# Patient Record
Sex: MALE | Race: WHITE | Employment: OTHER | URBAN - METROPOLITAN AREA
[De-identification: names, ages, dates, MRNs, and addresses within clinical notes are randomized per-mention and may not be internally consistent; named-entity substitution may affect disease eponyms.]

---

## 2017-01-25 ENCOUNTER — OFFICE VISIT (OUTPATIENT)
Dept: ORTHOPEDIC SURGERY | Age: 67
End: 2017-01-25

## 2017-01-25 VITALS
BODY MASS INDEX: 31.7 KG/M2 | HEIGHT: 69 IN | SYSTOLIC BLOOD PRESSURE: 98 MMHG | HEART RATE: 63 BPM | RESPIRATION RATE: 16 BRPM | DIASTOLIC BLOOD PRESSURE: 55 MMHG | WEIGHT: 214 LBS

## 2017-01-25 DIAGNOSIS — M65.312 TRIGGER FINGER OF LEFT THUMB: ICD-10-CM

## 2017-01-25 PROCEDURE — 4004F PT TOBACCO SCREEN RCVD TLK: CPT | Performed by: ORTHOPAEDIC SURGERY

## 2017-01-25 PROCEDURE — 20550 NJX 1 TENDON SHEATH/LIGAMENT: CPT | Performed by: ORTHOPAEDIC SURGERY

## 2017-01-25 PROCEDURE — 99203 OFFICE O/P NEW LOW 30 MIN: CPT | Performed by: ORTHOPAEDIC SURGERY

## 2017-01-25 PROCEDURE — G8484 FLU IMMUNIZE NO ADMIN: HCPCS | Performed by: ORTHOPAEDIC SURGERY

## 2017-01-25 PROCEDURE — G8598 ASA/ANTIPLAT THER USED: HCPCS | Performed by: ORTHOPAEDIC SURGERY

## 2017-01-25 PROCEDURE — 3017F COLORECTAL CA SCREEN DOC REV: CPT | Performed by: ORTHOPAEDIC SURGERY

## 2017-01-25 PROCEDURE — G8427 DOCREV CUR MEDS BY ELIG CLIN: HCPCS | Performed by: ORTHOPAEDIC SURGERY

## 2017-01-25 PROCEDURE — G8419 CALC BMI OUT NRM PARAM NOF/U: HCPCS | Performed by: ORTHOPAEDIC SURGERY

## 2017-01-25 PROCEDURE — 4040F PNEUMOC VAC/ADMIN/RCVD: CPT | Performed by: ORTHOPAEDIC SURGERY

## 2017-04-18 ENCOUNTER — OFFICE VISIT (OUTPATIENT)
Dept: SLEEP MEDICINE | Age: 67
End: 2017-04-18

## 2017-04-18 VITALS
OXYGEN SATURATION: 96 % | HEIGHT: 69 IN | BODY MASS INDEX: 32.29 KG/M2 | SYSTOLIC BLOOD PRESSURE: 130 MMHG | WEIGHT: 218 LBS | HEART RATE: 71 BPM | DIASTOLIC BLOOD PRESSURE: 70 MMHG

## 2017-04-18 DIAGNOSIS — I25.10 CORONARY ARTERY DISEASE INVOLVING NATIVE CORONARY ARTERY OF NATIVE HEART WITHOUT ANGINA PECTORIS: Chronic | ICD-10-CM

## 2017-04-18 DIAGNOSIS — E11.9 CONTROLLED TYPE 2 DIABETES MELLITUS WITHOUT COMPLICATION, UNSPECIFIED LONG TERM INSULIN USE STATUS: Chronic | ICD-10-CM

## 2017-04-18 DIAGNOSIS — I10 HYPERTENSION, ESSENTIAL: Chronic | ICD-10-CM

## 2017-04-18 DIAGNOSIS — G47.33 OBSTRUCTIVE SLEEP APNEA (ADULT) (PEDIATRIC): Primary | Chronic | ICD-10-CM

## 2017-04-18 PROCEDURE — 99214 OFFICE O/P EST MOD 30 MIN: CPT | Performed by: INTERNAL MEDICINE

## 2017-04-18 PROCEDURE — 3017F COLORECTAL CA SCREEN DOC REV: CPT | Performed by: INTERNAL MEDICINE

## 2017-04-18 PROCEDURE — G8427 DOCREV CUR MEDS BY ELIG CLIN: HCPCS | Performed by: INTERNAL MEDICINE

## 2017-04-18 PROCEDURE — 1123F ACP DISCUSS/DSCN MKR DOCD: CPT | Performed by: INTERNAL MEDICINE

## 2017-04-18 PROCEDURE — 4004F PT TOBACCO SCREEN RCVD TLK: CPT | Performed by: INTERNAL MEDICINE

## 2017-04-18 PROCEDURE — 3046F HEMOGLOBIN A1C LEVEL >9.0%: CPT | Performed by: INTERNAL MEDICINE

## 2017-04-18 PROCEDURE — G8598 ASA/ANTIPLAT THER USED: HCPCS | Performed by: INTERNAL MEDICINE

## 2017-04-18 PROCEDURE — 4040F PNEUMOC VAC/ADMIN/RCVD: CPT | Performed by: INTERNAL MEDICINE

## 2017-04-18 PROCEDURE — G8419 CALC BMI OUT NRM PARAM NOF/U: HCPCS | Performed by: INTERNAL MEDICINE

## 2017-04-18 ASSESSMENT — ENCOUNTER SYMPTOMS
PHOTOPHOBIA: 0
SHORTNESS OF BREATH: 0
EYE PAIN: 0
SINUS PRESSURE: 0
STRIDOR: 0
CHEST TIGHTNESS: 0

## 2017-04-18 ASSESSMENT — SLEEP AND FATIGUE QUESTIONNAIRES
HOW LIKELY ARE YOU TO NOD OFF OR FALL ASLEEP WHILE WATCHING TV: 0
ESS TOTAL SCORE: 3
HOW LIKELY ARE YOU TO NOD OFF OR FALL ASLEEP IN A CAR, WHILE STOPPED FOR A FEW MINUTES IN TRAFFIC: 0
HOW LIKELY ARE YOU TO NOD OFF OR FALL ASLEEP WHILE SITTING AND READING: 1
HOW LIKELY ARE YOU TO NOD OFF OR FALL ASLEEP WHEN YOU ARE A PASSENGER IN A CAR FOR AN HOUR WITHOUT A BREAK: 0
HOW LIKELY ARE YOU TO NOD OFF OR FALL ASLEEP WHILE SITTING AND TALKING TO SOMEONE: 0
HOW LIKELY ARE YOU TO NOD OFF OR FALL ASLEEP WHILE SITTING QUIETLY AFTER LUNCH WITHOUT ALCOHOL: 0
HOW LIKELY ARE YOU TO NOD OFF OR FALL ASLEEP WHILE LYING DOWN TO REST IN THE AFTERNOON WHEN CIRCUMSTANCES PERMIT: 2
HOW LIKELY ARE YOU TO NOD OFF OR FALL ASLEEP WHILE SITTING INACTIVE IN A PUBLIC PLACE: 0

## 2017-05-26 PROBLEM — R20.0 NUMBNESS OF ARM: Status: ACTIVE | Noted: 2017-05-26

## 2017-06-02 ENCOUNTER — NURSE ONLY (OUTPATIENT)
Dept: CARDIOLOGY CLINIC | Age: 67
End: 2017-06-02

## 2017-06-02 ENCOUNTER — HOSPITAL ENCOUNTER (OUTPATIENT)
Dept: NON INVASIVE DIAGNOSTICS | Age: 67
Discharge: HOME | End: 2017-06-02
Attending: INTERNAL MEDICINE | Admitting: INTERNAL MEDICINE

## 2017-06-02 ENCOUNTER — OFFICE VISIT (OUTPATIENT)
Dept: CARDIOLOGY CLINIC | Age: 67
End: 2017-06-02

## 2017-06-02 VITALS
DIASTOLIC BLOOD PRESSURE: 74 MMHG | WEIGHT: 217 LBS | HEART RATE: 56 BPM | HEIGHT: 69 IN | BODY MASS INDEX: 32.14 KG/M2 | SYSTOLIC BLOOD PRESSURE: 144 MMHG | OXYGEN SATURATION: 99 %

## 2017-06-02 DIAGNOSIS — G45.1 CAROTID ARTERY SYNDROME HEMISPHERIC: ICD-10-CM

## 2017-06-02 DIAGNOSIS — Z95.5 STATUS POST INSERTION OF DRUG-ELUTING STENT INTO RIGHT CORONARY ARTERY FOR CORONARY ARTERY DISEASE: Chronic | ICD-10-CM

## 2017-06-02 DIAGNOSIS — G45.9 TRANSIENT CEREBRAL ISCHEMIA, UNSPECIFIED TYPE: ICD-10-CM

## 2017-06-02 DIAGNOSIS — I49.9 IRREGULAR HEART BEATS: ICD-10-CM

## 2017-06-02 DIAGNOSIS — I49.8 OTHER CARDIAC ARRHYTHMIA: Primary | ICD-10-CM

## 2017-06-02 DIAGNOSIS — I49.8 OTHER SPECIFIED CARDIAC ARRHYTHMIAS: Primary | ICD-10-CM

## 2017-06-02 DIAGNOSIS — I25.10 CORONARY ARTERY DISEASE INVOLVING NATIVE HEART WITHOUT ANGINA PECTORIS, UNSPECIFIED VESSEL OR LESION TYPE: Chronic | ICD-10-CM

## 2017-06-02 DIAGNOSIS — Z95.5 STATUS POST INSERTION OF DRUG-ELUTING STENT INTO LEFT ANTERIOR DESCENDING (LAD) ARTERY: ICD-10-CM

## 2017-06-02 DIAGNOSIS — R00.2 HEART PALPITATIONS: ICD-10-CM

## 2017-06-02 DIAGNOSIS — E78.2 MIXED HYPERLIPIDEMIA: Chronic | ICD-10-CM

## 2017-06-02 DIAGNOSIS — G45.1 TIA INVOLVING RIGHT INTERNAL CAROTID ARTERY: ICD-10-CM

## 2017-06-02 DIAGNOSIS — I10 ESSENTIAL HYPERTENSION: Chronic | ICD-10-CM

## 2017-06-02 LAB
LV EF: 60 %
LVEF MODALITY: NORMAL

## 2017-06-02 PROCEDURE — G8417 CALC BMI ABV UP PARAM F/U: HCPCS | Performed by: INTERNAL MEDICINE

## 2017-06-02 PROCEDURE — G8598 ASA/ANTIPLAT THER USED: HCPCS | Performed by: INTERNAL MEDICINE

## 2017-06-02 PROCEDURE — 99214 OFFICE O/P EST MOD 30 MIN: CPT | Performed by: INTERNAL MEDICINE

## 2017-06-02 PROCEDURE — 1036F TOBACCO NON-USER: CPT | Performed by: INTERNAL MEDICINE

## 2017-06-02 PROCEDURE — G8427 DOCREV CUR MEDS BY ELIG CLIN: HCPCS | Performed by: INTERNAL MEDICINE

## 2017-06-02 PROCEDURE — 1111F DSCHRG MED/CURRENT MED MERGE: CPT | Performed by: INTERNAL MEDICINE

## 2017-06-02 PROCEDURE — 3017F COLORECTAL CA SCREEN DOC REV: CPT | Performed by: INTERNAL MEDICINE

## 2017-06-02 PROCEDURE — 4040F PNEUMOC VAC/ADMIN/RCVD: CPT | Performed by: INTERNAL MEDICINE

## 2017-06-02 PROCEDURE — 1123F ACP DISCUSS/DSCN MKR DOCD: CPT | Performed by: INTERNAL MEDICINE

## 2017-06-02 RX ORDER — LISINOPRIL 20 MG/1
TABLET ORAL
Qty: 90 TABLET | Refills: 3 | Status: SHIPPED | OUTPATIENT
Start: 2017-06-02

## 2017-06-02 RX ORDER — ADHESIVE BANDAGE 3/4"
1 BANDAGE TOPICAL DAILY
Qty: 1 EACH | Refills: 0 | Status: SHIPPED | OUTPATIENT
Start: 2017-06-02

## 2017-06-09 ENCOUNTER — TELEPHONE (OUTPATIENT)
Dept: NEUROLOGY | Age: 67
End: 2017-06-09

## 2017-06-13 ENCOUNTER — TELEPHONE (OUTPATIENT)
Dept: NEUROLOGY | Age: 67
End: 2017-06-13

## 2017-06-26 ENCOUNTER — OFFICE VISIT (OUTPATIENT)
Dept: NEUROLOGY | Age: 67
End: 2017-06-26

## 2017-06-26 ENCOUNTER — HOSPITAL ENCOUNTER (OUTPATIENT)
Dept: OTHER | Age: 67
Discharge: HOME | End: 2017-06-26
Attending: PSYCHIATRY & NEUROLOGY | Admitting: PSYCHIATRY & NEUROLOGY

## 2017-06-26 VITALS
DIASTOLIC BLOOD PRESSURE: 86 MMHG | HEART RATE: 84 BPM | WEIGHT: 221 LBS | BODY MASS INDEX: 32.73 KG/M2 | SYSTOLIC BLOOD PRESSURE: 139 MMHG | HEIGHT: 69 IN

## 2017-06-26 DIAGNOSIS — G40.119 LOCALIZATION-RELATED (FOCAL) (PARTIAL) SYMPTOMATIC EPILEPSY AND EPILEPTIC SYNDROMES WITH SIMPLE PARTIAL SEIZURES, INTRACTABLE, WITHOUT STATUS EPILEPTICUS (HCC): Primary | ICD-10-CM

## 2017-06-26 DIAGNOSIS — G40.119 LOCALIZATION-RELATED (FOCAL) (PARTIAL) SYMPTOMATIC EPILEPSY AND EPILEPTIC SYNDROMES WITH SIMPLE PARTIAL SEIZURES, INTRACTABLE, WITHOUT STATUS EPILEPTICUS (HCC): ICD-10-CM

## 2017-06-26 DIAGNOSIS — R20.0 LEFT SIDED NUMBNESS: ICD-10-CM

## 2017-06-26 LAB
ALT SERPL-CCNC: 16 U/L (ref 10–40)
AST SERPL-CCNC: 17 U/L (ref 15–37)
BASOPHILS ABSOLUTE: 0 K/UL (ref 0–0.2)
BASOPHILS RELATIVE PERCENT: 0.6 %
EOSINOPHILS ABSOLUTE: 0.2 K/UL (ref 0–0.6)
EOSINOPHILS RELATIVE PERCENT: 3 %
HCT VFR BLD CALC: 40.2 % (ref 40.5–52.5)
HEMOGLOBIN: 13.8 G/DL (ref 13.5–17.5)
LYMPHOCYTES ABSOLUTE: 2.1 K/UL (ref 1–5.1)
LYMPHOCYTES RELATIVE PERCENT: 36.7 %
MCH RBC QN AUTO: 30.2 PG (ref 26–34)
MCHC RBC AUTO-ENTMCNC: 34.4 G/DL (ref 31–36)
MCV RBC AUTO: 87.7 FL (ref 80–100)
MONOCYTES ABSOLUTE: 0.6 K/UL (ref 0–1.3)
MONOCYTES RELATIVE PERCENT: 10 %
NEUTROPHILS ABSOLUTE: 2.8 K/UL (ref 1.7–7.7)
NEUTROPHILS RELATIVE PERCENT: 49.7 %
PDW BLD-RTO: 12.8 % (ref 12.4–15.4)
PLATELET # BLD: 159 K/UL (ref 135–450)
PMV BLD AUTO: 10 FL (ref 5–10.5)
RBC # BLD: 4.59 M/UL (ref 4.2–5.9)
VALPROIC ACID LEVEL: 18 UG/ML (ref 50–100)
WBC # BLD: 5.6 K/UL (ref 4–11)

## 2017-06-26 PROCEDURE — G8417 CALC BMI ABV UP PARAM F/U: HCPCS | Performed by: PSYCHIATRY & NEUROLOGY

## 2017-06-26 PROCEDURE — 99214 OFFICE O/P EST MOD 30 MIN: CPT | Performed by: PSYCHIATRY & NEUROLOGY

## 2017-06-26 PROCEDURE — 3017F COLORECTAL CA SCREEN DOC REV: CPT | Performed by: PSYCHIATRY & NEUROLOGY

## 2017-06-26 PROCEDURE — G8427 DOCREV CUR MEDS BY ELIG CLIN: HCPCS | Performed by: PSYCHIATRY & NEUROLOGY

## 2017-06-26 PROCEDURE — 1036F TOBACCO NON-USER: CPT | Performed by: PSYCHIATRY & NEUROLOGY

## 2017-06-26 PROCEDURE — G8598 ASA/ANTIPLAT THER USED: HCPCS | Performed by: PSYCHIATRY & NEUROLOGY

## 2017-06-26 PROCEDURE — 1111F DSCHRG MED/CURRENT MED MERGE: CPT | Performed by: PSYCHIATRY & NEUROLOGY

## 2017-06-26 PROCEDURE — 4040F PNEUMOC VAC/ADMIN/RCVD: CPT | Performed by: PSYCHIATRY & NEUROLOGY

## 2017-06-26 PROCEDURE — 1123F ACP DISCUSS/DSCN MKR DOCD: CPT | Performed by: PSYCHIATRY & NEUROLOGY

## 2017-06-28 ENCOUNTER — TELEPHONE (OUTPATIENT)
Dept: NEUROLOGY | Age: 67
End: 2017-06-28

## 2017-06-28 DIAGNOSIS — G40.109 PARTIAL SYMPTOMATIC EPILEPSY WITH SIMPLE PARTIAL SEIZURES, NOT INTRACTABLE, WITHOUT STATUS EPILEPTICUS (HCC): Primary | ICD-10-CM

## 2017-07-12 ENCOUNTER — HOSPITAL ENCOUNTER (OUTPATIENT)
Dept: OTHER | Age: 67
Discharge: HOME | End: 2017-07-12
Attending: PSYCHIATRY & NEUROLOGY | Admitting: PSYCHIATRY & NEUROLOGY

## 2017-07-12 LAB — VALPROIC ACID LEVEL: 17.2 UG/ML (ref 50–100)

## 2017-07-17 PROCEDURE — 93228 REMOTE 30 DAY ECG REV/REPORT: CPT | Performed by: INTERNAL MEDICINE

## 2017-07-26 ENCOUNTER — TELEPHONE (OUTPATIENT)
Dept: CARDIOLOGY CLINIC | Age: 67
End: 2017-07-26

## 2017-07-26 DIAGNOSIS — G45.1 TIA INVOLVING RIGHT INTERNAL CAROTID ARTERY: ICD-10-CM

## 2017-07-28 ENCOUNTER — OFFICE VISIT (OUTPATIENT)
Dept: NEUROLOGY | Age: 67
End: 2017-07-28

## 2017-07-28 VITALS
BODY MASS INDEX: 32.44 KG/M2 | DIASTOLIC BLOOD PRESSURE: 76 MMHG | SYSTOLIC BLOOD PRESSURE: 138 MMHG | HEART RATE: 58 BPM | HEIGHT: 69 IN | WEIGHT: 219 LBS

## 2017-07-28 DIAGNOSIS — G40.109 PARTIAL SYMPTOMATIC EPILEPSY WITH SIMPLE PARTIAL SEIZURES, NOT INTRACTABLE, WITHOUT STATUS EPILEPTICUS (HCC): Primary | ICD-10-CM

## 2017-07-28 PROCEDURE — 1123F ACP DISCUSS/DSCN MKR DOCD: CPT | Performed by: PSYCHIATRY & NEUROLOGY

## 2017-07-28 PROCEDURE — G8417 CALC BMI ABV UP PARAM F/U: HCPCS | Performed by: PSYCHIATRY & NEUROLOGY

## 2017-07-28 PROCEDURE — 4040F PNEUMOC VAC/ADMIN/RCVD: CPT | Performed by: PSYCHIATRY & NEUROLOGY

## 2017-07-28 PROCEDURE — G8427 DOCREV CUR MEDS BY ELIG CLIN: HCPCS | Performed by: PSYCHIATRY & NEUROLOGY

## 2017-07-28 PROCEDURE — 3017F COLORECTAL CA SCREEN DOC REV: CPT | Performed by: PSYCHIATRY & NEUROLOGY

## 2017-07-28 PROCEDURE — G8598 ASA/ANTIPLAT THER USED: HCPCS | Performed by: PSYCHIATRY & NEUROLOGY

## 2017-07-28 PROCEDURE — 99213 OFFICE O/P EST LOW 20 MIN: CPT | Performed by: PSYCHIATRY & NEUROLOGY

## 2017-07-28 PROCEDURE — 1036F TOBACCO NON-USER: CPT | Performed by: PSYCHIATRY & NEUROLOGY

## 2017-07-28 RX ORDER — DIVALPROEX SODIUM 500 MG/1
TABLET, EXTENDED RELEASE ORAL
Qty: 60 TABLET | Refills: 2 | Status: SHIPPED | OUTPATIENT
Start: 2017-07-28 | End: 2017-10-27 | Stop reason: SDUPTHER

## 2017-08-09 ENCOUNTER — OFFICE VISIT (OUTPATIENT)
Dept: ORTHOPEDIC SURGERY | Age: 67
End: 2017-08-09

## 2017-08-09 VITALS
DIASTOLIC BLOOD PRESSURE: 52 MMHG | WEIGHT: 219 LBS | HEART RATE: 64 BPM | BODY MASS INDEX: 32.44 KG/M2 | RESPIRATION RATE: 15 BRPM | HEIGHT: 69 IN | SYSTOLIC BLOOD PRESSURE: 122 MMHG

## 2017-08-09 DIAGNOSIS — M65.312 TRIGGER FINGER OF LEFT THUMB: ICD-10-CM

## 2017-08-09 PROCEDURE — 1036F TOBACCO NON-USER: CPT | Performed by: ORTHOPAEDIC SURGERY

## 2017-08-09 PROCEDURE — 20550 NJX 1 TENDON SHEATH/LIGAMENT: CPT | Performed by: ORTHOPAEDIC SURGERY

## 2017-08-09 PROCEDURE — G8417 CALC BMI ABV UP PARAM F/U: HCPCS | Performed by: ORTHOPAEDIC SURGERY

## 2017-08-09 PROCEDURE — 1123F ACP DISCUSS/DSCN MKR DOCD: CPT | Performed by: ORTHOPAEDIC SURGERY

## 2017-08-09 PROCEDURE — G8427 DOCREV CUR MEDS BY ELIG CLIN: HCPCS | Performed by: ORTHOPAEDIC SURGERY

## 2017-08-09 PROCEDURE — G8598 ASA/ANTIPLAT THER USED: HCPCS | Performed by: ORTHOPAEDIC SURGERY

## 2017-08-09 PROCEDURE — 4040F PNEUMOC VAC/ADMIN/RCVD: CPT | Performed by: ORTHOPAEDIC SURGERY

## 2017-08-09 PROCEDURE — 3017F COLORECTAL CA SCREEN DOC REV: CPT | Performed by: ORTHOPAEDIC SURGERY

## 2017-08-09 PROCEDURE — 99213 OFFICE O/P EST LOW 20 MIN: CPT | Performed by: ORTHOPAEDIC SURGERY

## 2017-09-20 RX ORDER — CLOPIDOGREL BISULFATE 75 MG/1
75 TABLET ORAL DAILY
Qty: 90 TABLET | Refills: 3 | Status: SHIPPED | OUTPATIENT
Start: 2017-09-20 | End: 2018-09-07 | Stop reason: SDUPTHER

## 2017-10-18 ENCOUNTER — TELEPHONE (OUTPATIENT)
Dept: CARDIOLOGY CLINIC | Age: 67
End: 2017-10-18

## 2017-10-18 DIAGNOSIS — E78.5 HYPERLIPIDEMIA, UNSPECIFIED HYPERLIPIDEMIA TYPE: ICD-10-CM

## 2017-10-18 RX ORDER — ATORVASTATIN CALCIUM 40 MG/1
40 TABLET, FILM COATED ORAL NIGHTLY
Qty: 90 TABLET | Refills: 3 | Status: SHIPPED | OUTPATIENT
Start: 2017-10-18 | End: 2019-03-27 | Stop reason: DRUGHIGH

## 2017-10-18 NOTE — TELEPHONE ENCOUNTER
Called to adv that there is an interaction between gemfibrozil (LOPID) 600 MG tablet and atorvastatin (LIPITOR) 40 MG tablet and wants to know if this is ok for pt to be on both.  Please call to adv thank you

## 2017-10-18 NOTE — TELEPHONE ENCOUNTER
There is an interaction but usually is infrequent. Given that his triglycerides are normal and his cholesterol panel is overall good. I would just try discontinuing the Lopid and repeating lipids in approximately 3 months.

## 2017-10-19 ENCOUNTER — HOSPITAL ENCOUNTER (OUTPATIENT)
Dept: OTHER | Age: 67
Discharge: HOME | End: 2017-10-19
Attending: PSYCHIATRY & NEUROLOGY | Admitting: PSYCHIATRY & NEUROLOGY

## 2017-10-19 DIAGNOSIS — G40.109 PARTIAL SYMPTOMATIC EPILEPSY WITH SIMPLE PARTIAL SEIZURES, NOT INTRACTABLE, WITHOUT STATUS EPILEPTICUS (HCC): ICD-10-CM

## 2017-10-19 LAB
ALT SERPL-CCNC: 13 U/L (ref 10–40)
AST SERPL-CCNC: 14 U/L (ref 15–37)
BASOPHILS ABSOLUTE: 0 K/UL (ref 0–0.2)
BASOPHILS RELATIVE PERCENT: 0.7 %
EOSINOPHILS ABSOLUTE: 0.1 K/UL (ref 0–0.6)
EOSINOPHILS RELATIVE PERCENT: 3.1 %
HCT VFR BLD CALC: 44.4 % (ref 40.5–52.5)
HEMOGLOBIN: 15 G/DL (ref 13.5–17.5)
LYMPHOCYTES ABSOLUTE: 1.7 K/UL (ref 1–5.1)
LYMPHOCYTES RELATIVE PERCENT: 34.8 %
MCH RBC QN AUTO: 30.1 PG (ref 26–34)
MCHC RBC AUTO-ENTMCNC: 33.8 G/DL (ref 31–36)
MCV RBC AUTO: 89.3 FL (ref 80–100)
MONOCYTES ABSOLUTE: 0.5 K/UL (ref 0–1.3)
MONOCYTES RELATIVE PERCENT: 10.6 %
NEUTROPHILS ABSOLUTE: 2.4 K/UL (ref 1.7–7.7)
NEUTROPHILS RELATIVE PERCENT: 50.8 %
PDW BLD-RTO: 13.6 % (ref 12.4–15.4)
PLATELET # BLD: 144 K/UL (ref 135–450)
PMV BLD AUTO: 9.5 FL (ref 5–10.5)
RBC # BLD: 4.98 M/UL (ref 4.2–5.9)
VALPROIC ACID LEVEL: 30.4 UG/ML (ref 50–100)
WBC # BLD: 4.8 K/UL (ref 4–11)

## 2017-10-27 ENCOUNTER — OFFICE VISIT (OUTPATIENT)
Dept: NEUROLOGY | Age: 67
End: 2017-10-27

## 2017-10-27 VITALS
DIASTOLIC BLOOD PRESSURE: 70 MMHG | SYSTOLIC BLOOD PRESSURE: 121 MMHG | WEIGHT: 217 LBS | BODY MASS INDEX: 32.14 KG/M2 | HEART RATE: 59 BPM | HEIGHT: 69 IN

## 2017-10-27 DIAGNOSIS — G40.109 PARTIAL SYMPTOMATIC EPILEPSY WITH SIMPLE PARTIAL SEIZURES, NOT INTRACTABLE, WITHOUT STATUS EPILEPTICUS (HCC): ICD-10-CM

## 2017-10-27 PROCEDURE — 1123F ACP DISCUSS/DSCN MKR DOCD: CPT | Performed by: PSYCHIATRY & NEUROLOGY

## 2017-10-27 PROCEDURE — 3017F COLORECTAL CA SCREEN DOC REV: CPT | Performed by: PSYCHIATRY & NEUROLOGY

## 2017-10-27 PROCEDURE — 99213 OFFICE O/P EST LOW 20 MIN: CPT | Performed by: PSYCHIATRY & NEUROLOGY

## 2017-10-27 PROCEDURE — G8427 DOCREV CUR MEDS BY ELIG CLIN: HCPCS | Performed by: PSYCHIATRY & NEUROLOGY

## 2017-10-27 PROCEDURE — 4040F PNEUMOC VAC/ADMIN/RCVD: CPT | Performed by: PSYCHIATRY & NEUROLOGY

## 2017-10-27 PROCEDURE — G8484 FLU IMMUNIZE NO ADMIN: HCPCS | Performed by: PSYCHIATRY & NEUROLOGY

## 2017-10-27 PROCEDURE — 1036F TOBACCO NON-USER: CPT | Performed by: PSYCHIATRY & NEUROLOGY

## 2017-10-27 PROCEDURE — G8417 CALC BMI ABV UP PARAM F/U: HCPCS | Performed by: PSYCHIATRY & NEUROLOGY

## 2017-10-27 PROCEDURE — G8598 ASA/ANTIPLAT THER USED: HCPCS | Performed by: PSYCHIATRY & NEUROLOGY

## 2017-10-27 RX ORDER — DIVALPROEX SODIUM 500 MG/1
TABLET, EXTENDED RELEASE ORAL
Qty: 90 TABLET | Refills: 2 | Status: SHIPPED | OUTPATIENT
Start: 2017-10-27 | End: 2018-08-28 | Stop reason: SDUPTHER

## 2017-10-27 NOTE — PROGRESS NOTES
Dar Gaucher   Neurology followup    Subjective:   CC/HP  History was obtained from the patient. Patient has partial simple seizures with recurrent episodes of left-sided numbness. MRI brain and MR angiography of the neck and brain as well as echocardiogram were normal.  Patient was started on Depakote  mg daily. Patient states that the frequency and the severity of the spells of compound down drastically after starting the medication  He had one spell on July 13 but it was mild.   Since then he has had a few minor spells as   No other neurological symptoms at this time    REVIEW OF SYSTEMS    Constitutional:  []   Chills   []  Fatigue   []  Fevers   []  Malaise   []  Weight loss     [x] Denies all of the above    Respiratory:   []  Cough    []  Shortness of breath         [x] Denies all of the above     Cardiovascular:   []  Chest pain    []  Exertional chest pressure/discomfort           [] Palpitations    []  Syncope     [x] Denies all of the above        Past Medical History:   Diagnosis Date    Anxiety     BPH (benign prostatic hyperplasia)     CAD (coronary artery disease) 10/25/2013    s/p angioplsty 2    Chronic back pain oct 2008    in [place] [address]    DDD (degenerative disc disease), lumbosacral 1/7/2013    Depression     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 [address]     sp biopsy 10 years by Dr Kacey Chase [facility])     Hyperlipidemia     Hypertension     Irritable bowel syndrome     Myofascial pain syndrome 1/7/2013    Obstructive sleep apnea (adult) (pediatric) 10/9/2014    Type II or unspecified type diabetes mellitus without mention of complication, not stated as uncontrolled      Family History   Problem Relation Age of Onset    High Blood Pressure Mother     Diabetes Father     Heart Disease Father     High Blood Pressure Father      Social History     Social History    Marital status: Single     Spouse name: N/A    Number of children: N/A    Years of education: N/A     Social History Main Although every effort was made to ensure the accuracy of this automated transcription, some errors in transcription may have occurred.

## 2017-11-02 ENCOUNTER — TELEPHONE (OUTPATIENT)
Dept: NEUROLOGY | Age: 67
End: 2017-11-02

## 2017-12-01 ENCOUNTER — OFFICE VISIT (OUTPATIENT)
Dept: CARDIOLOGY CLINIC | Age: 67
End: 2017-12-01

## 2017-12-01 VITALS
HEART RATE: 50 BPM | WEIGHT: 221.4 LBS | OXYGEN SATURATION: 97 % | HEIGHT: 69 IN | SYSTOLIC BLOOD PRESSURE: 130 MMHG | BODY MASS INDEX: 32.79 KG/M2 | DIASTOLIC BLOOD PRESSURE: 70 MMHG

## 2017-12-01 DIAGNOSIS — Z95.5 STATUS POST INSERTION OF DRUG-ELUTING STENT INTO RIGHT CORONARY ARTERY FOR CORONARY ARTERY DISEASE: Chronic | ICD-10-CM

## 2017-12-01 DIAGNOSIS — I25.10 CORONARY ARTERY DISEASE DUE TO LIPID RICH PLAQUE: Primary | ICD-10-CM

## 2017-12-01 DIAGNOSIS — E78.00 PURE HYPERCHOLESTEROLEMIA: ICD-10-CM

## 2017-12-01 DIAGNOSIS — Z95.5 STATUS POST INSERTION OF DRUG-ELUTING STENT INTO LEFT ANTERIOR DESCENDING (LAD) ARTERY: ICD-10-CM

## 2017-12-01 DIAGNOSIS — I10 ESSENTIAL HYPERTENSION: Chronic | ICD-10-CM

## 2017-12-01 DIAGNOSIS — I25.83 CORONARY ARTERY DISEASE DUE TO LIPID RICH PLAQUE: Primary | ICD-10-CM

## 2017-12-01 PROCEDURE — 1036F TOBACCO NON-USER: CPT | Performed by: INTERNAL MEDICINE

## 2017-12-01 PROCEDURE — 1123F ACP DISCUSS/DSCN MKR DOCD: CPT | Performed by: INTERNAL MEDICINE

## 2017-12-01 PROCEDURE — 4040F PNEUMOC VAC/ADMIN/RCVD: CPT | Performed by: INTERNAL MEDICINE

## 2017-12-01 PROCEDURE — 3017F COLORECTAL CA SCREEN DOC REV: CPT | Performed by: INTERNAL MEDICINE

## 2017-12-01 PROCEDURE — G8598 ASA/ANTIPLAT THER USED: HCPCS | Performed by: INTERNAL MEDICINE

## 2017-12-01 PROCEDURE — 99213 OFFICE O/P EST LOW 20 MIN: CPT | Performed by: INTERNAL MEDICINE

## 2017-12-01 PROCEDURE — G8417 CALC BMI ABV UP PARAM F/U: HCPCS | Performed by: INTERNAL MEDICINE

## 2017-12-01 PROCEDURE — G8484 FLU IMMUNIZE NO ADMIN: HCPCS | Performed by: INTERNAL MEDICINE

## 2017-12-01 PROCEDURE — G8427 DOCREV CUR MEDS BY ELIG CLIN: HCPCS | Performed by: INTERNAL MEDICINE

## 2017-12-01 RX ORDER — OMEPRAZOLE 20 MG/1
20 CAPSULE, DELAYED RELEASE ORAL DAILY
COMMUNITY

## 2017-12-01 NOTE — PROGRESS NOTES
[address]   Cardiac follow up    Referring Provider:  Savita Elizabeth MD     Chief Complaint   Patient presents with    Coronary Artery Disease    Hypertension    Carotid Disease     h/o TIA earlier 2017      History of Present Illness:  Mr. Abdirahman Coleman is here today for routine follow up of his CAD, HTN, HLD; he is also treated for DM. He also uses CPAP machine on a regular basis. Just prior to last visit, he was diagnosed and hospitalized with a recent TIA last week. He had developed facial tingling, needle and pins feeling in the left arm. Everything resolved. He was started on Plavix. He quit smoking. Today, he states he feels well overall. He denies exertional chest pain, UP/PND, palpitations, light-headedness, edema. Somewhat active, does not smoke. Past Medical History:   has a past medical history of Anxiety; BPH (benign prostatic hyperplasia); CAD (coronary artery disease); Chronic back pain; DDD (degenerative disc disease), lumbosacral; Depression; Eosinophilic granuloma ([address] Epilepsy ([facility] [address] Hyperlipidemia; Hypertension; Irritable bowel syndrome; Myofascial pain syndrome; Obstructive sleep apnea (adult) (pediatric); and Type II or unspecified type diabetes mellitus without mention of complication, not stated as uncontrolled. Surgical History:   has a past surgical history that includes Lung biopsy; Coronary angioplasty with stent (10/2013); and Elbow bursa surgery (Left, 7/24/2014). Social History:   reports that he quit smoking about 6 months ago. His smoking use included Cigarettes. He has a 5.00 pack-year smoking history. He has never used smokeless tobacco. He reports that he does not drink alcohol or use drugs. Family History:  family history includes Diabetes in his father; Heart Disease in his father; High Blood Pressure in his father and mother.      Current Outpatient Prescriptions:     omeprazole (PRILOSEC) 20 MG delayed release capsule, Take 20 mg by mouth daily, Disp: , Rfl:     divalproex (DEPAKOTE ER) 500 MG extended release tablet, Take 3 tablets by mouth daily, Disp: 90 tablet, Rfl: 2    atorvastatin (LIPITOR) 40 MG tablet, Take 1 tablet by mouth nightly, Disp: 90 tablet, Rfl: 3    clopidogrel (PLAVIX) 75 MG tablet, Take 1 tablet by mouth daily, Disp: 90 tablet, Rfl: 3    ALPRAZolam (XANAX) 1 MG tablet, Take 1 mg by mouth nightly as needed for Sleep, Disp: , Rfl:     lisinopril (PRINIVIL;ZESTRIL) 20 MG tablet, TAKE 1 TABLET BY MOUTH DAILY, Disp: 90 tablet, Rfl: 3    Blood Pressure Monitoring (BLOOD PRESSURE CUFF) MISC, 1 Units by Does not apply route daily, Disp: 1 each, Rfl: 0    metoprolol tartrate (LOPRESSOR) 25 MG tablet, TAKE 1 TABLET BY MOUTH 2 TIMES DAILY, Disp: 180 tablet, Rfl: 3    hydrochlorothiazide (MICROZIDE) 12.5 MG capsule, Take 12.5 mg by mouth every morning, Disp: , Rfl:     FLUoxetine (PROZAC) 20 MG capsule, Take 1 capsule by mouth daily, Disp: 30 capsule, Rfl: 2    tamsulosin (FLOMAX) 0.4 MG capsule, TAKE ONE CAPSULE BY MOUTH EVERY DAY IN THE EVENING, Disp: 30 capsule, Rfl: 2    diphenoxylate-atropine (LOMOTIL) 2.5-0.025 MG per tablet, 1 tid, Disp: 90 tablet, Rfl: 0    HYDROcodone-acetaminophen (NORCO) 5-325 MG per tablet, TAKE 1 TABLET BY MOUTH EVERY 6 HOURS AS NEEDED., Disp: 60 tablet, Rfl: 0    aspirin 81 MG tablet, Take 81 mg by mouth daily, Disp: , Rfl:     lansoprazole (PREVACID) 30 MG capsule, TAKE ONE CAPSULE BY MOUTH EVERY DAY, Disp: 30 capsule, Rfl: 2    gemfibrozil (LOPID) 600 MG tablet, TAKE 1 TABLET BY MOUTH TWICE A DAY, Disp: 60 tablet, Rfl: 2    Allergies:  Penicillins; Benadryl [diphenhydramine hcl];  Erythromycin; and Iodine     Review of Systems:   · 10 point review of systems is negative except as noted in the HPI    Physical Examination:    Vitals:    12/01/17 1444   BP: 130/70   Pulse: 50   SpO2: 97%        Constitutional and General Appearance: NAD   Respiratory:  · Normal excursion and expansion without use

## 2018-03-02 ENCOUNTER — OFFICE VISIT (OUTPATIENT)
Dept: NEUROLOGY | Age: 68
End: 2018-03-02

## 2018-03-02 ENCOUNTER — HOSPITAL ENCOUNTER (OUTPATIENT)
Dept: OTHER | Age: 68
Discharge: HOME | End: 2018-03-02
Attending: PSYCHIATRY & NEUROLOGY | Admitting: PSYCHIATRY & NEUROLOGY

## 2018-03-02 VITALS
SYSTOLIC BLOOD PRESSURE: 129 MMHG | DIASTOLIC BLOOD PRESSURE: 73 MMHG | WEIGHT: 218 LBS | BODY MASS INDEX: 32.29 KG/M2 | HEIGHT: 69 IN | HEART RATE: 58 BPM

## 2018-03-02 DIAGNOSIS — G40.119 PARTIAL SYMPTOMATIC EPILEPSY WITH SIMPLE PARTIAL SEIZURES, INTRACTABLE, WITHOUT STATUS EPILEPTICUS (HCC): Primary | ICD-10-CM

## 2018-03-02 PROBLEM — G40.909 EPILEPSY (HCC): Status: ACTIVE | Noted: 2018-03-02

## 2018-03-02 LAB — VALPROIC ACID LEVEL: 58.9 UG/ML (ref 50–100)

## 2018-03-02 PROCEDURE — 4040F PNEUMOC VAC/ADMIN/RCVD: CPT | Performed by: PSYCHIATRY & NEUROLOGY

## 2018-03-02 PROCEDURE — G8417 CALC BMI ABV UP PARAM F/U: HCPCS | Performed by: PSYCHIATRY & NEUROLOGY

## 2018-03-02 PROCEDURE — 1123F ACP DISCUSS/DSCN MKR DOCD: CPT | Performed by: PSYCHIATRY & NEUROLOGY

## 2018-03-02 PROCEDURE — 99213 OFFICE O/P EST LOW 20 MIN: CPT | Performed by: PSYCHIATRY & NEUROLOGY

## 2018-03-02 PROCEDURE — G8484 FLU IMMUNIZE NO ADMIN: HCPCS | Performed by: PSYCHIATRY & NEUROLOGY

## 2018-03-02 PROCEDURE — 4004F PT TOBACCO SCREEN RCVD TLK: CPT | Performed by: PSYCHIATRY & NEUROLOGY

## 2018-03-02 PROCEDURE — G8427 DOCREV CUR MEDS BY ELIG CLIN: HCPCS | Performed by: PSYCHIATRY & NEUROLOGY

## 2018-03-02 PROCEDURE — G8598 ASA/ANTIPLAT THER USED: HCPCS | Performed by: PSYCHIATRY & NEUROLOGY

## 2018-03-02 PROCEDURE — 3017F COLORECTAL CA SCREEN DOC REV: CPT | Performed by: PSYCHIATRY & NEUROLOGY

## 2018-03-02 NOTE — PATIENT INSTRUCTIONS
Please call with any questions or concerns:   [facility] Neurology  @ [phone]. LAB RESULTS:  Please obtain any labs or diagnostic tests as discussed today. You should call the office to check the results. Please allow  3 to 7 days for us to get these results. MEDICATION LIST:  Please bring an accurate list of your medications to every visit. APPOINTMENT CONFIRMATION:  We will call you the day before your scheduled appointment to confirm. If we are unable to reach you, you MUST call back by the end of the day to confirm the appointment or we may be forced to cancel. Smoking Cessation  This document explains the best ways for you to quit smoking and new treatments to help. It lists new medicines that can double or triple your chances of quitting and quitting for good. It also considers ways to avoid relapses and concerns you may have about quitting, including weight gain. NICOTINE: A POWERFUL ADDICTION  If you have tried to quit smoking, you know how hard it can be. It is hard because nicotine is a very addictive drug. For some people, it can be as addictive as heroin or cocaine. Usually, people make 2 or 3 tries, or more, before finally being able to quit. Each time you try to quit, you can learn about what helps and what hurts. Quitting takes hard work and a lot of effort, but you can quit smoking. QUITTING SMOKING IS ONE OF THE MOST IMPORTANT THINGS YOU WILL EVER DO.  · You will live longer, feel better, and live better. · The impact on your body of quitting smoking is felt almost immediately:  · Within 20 minutes, blood pressure decreases. Pulse returns to its normal level. · After 8 hours, carbon monoxide levels in the blood return to normal. Oxygen level increases. · After 24 hours, chance of heart attack starts to decrease. Breath, hair, and body stop smelling like smoke. · After 48 hours, damaged nerve endings begin to recover.  Sense of taste and smell them not to smoke around you. · Talk to your caregivers (doctor, dentist, nurse, pharmacist, psychologist, and/or smoking counselor). · Get individual, group, or telephone counseling and support. The more counseling you have, the better your chances are of quitting. Programs are available at [facility]. Call your local health department for information about programs in your area. · Spiritual beliefs and practices may help some smokers quit. · Quit meters are small computer programs online or downloadable that keep track of quit statistics, such as amount of \"quit-time,\" cigarettes not smoked, and money saved. · Many smokers find one or more of the many self-help books available useful in helping them quit and stay off tobacco.  3. LEARN NEW SKILLS AND BEHAVIORS  · Try to distract yourself from urges to smoke. Talk to someone, go for a walk, or occupy your time with a task. · When you first try to quit, change your routine. Take a different route to work. Drink tea instead of coffee. Eat breakfast in a different place. · Do something to reduce your stress. Take a hot bath, exercise, or read a book. · Plan something enjoyable to do every day. Reward yourself for not smoking. · Explore interactive web-based programs that specialize in helping you quit. 4. GET MEDICINE AND USE IT CORRECTLY  Medicines can help you stop smoking and decrease the urge to smoke. Combining medicine with the above behavioral methods and support can quadruple your chances of successfully quitting smoking. The U[place]. Food and Drug Administration (FDA) has approved 7 medicines to help you quit smoking. These medicines fall into 3 categories. · Nicotine replacement therapy (delivers nicotine to your body without the negative effects and risks of smoking):  · Nicotine gum: Available over-the-counter. · Nicotine lozenges: Available over-the-counter. · Nicotine inhaler: Available by prescription.   · Nicotine nasal concentrating. · The withdrawal symptoms are only temporary. They are strongest when you first quit, but they will go away within 10 to 14 days. Here are some difficult situations to watch for:  · Alcohol. Avoid drinking alcohol. Drinking lowers your chances of successfully quitting. · Caffeine. Try to reduce the amount of caffeine you consume. It also lowers your chances of successfully quitting. · Other smokers. Being around smoking can make you want to smoke. Avoid smokers. · Weight gain. Many smokers will gain weight when they quit, usually less than 10 pounds. Eat a healthy diet and stay active. Do not let weight gain distract you from your main goal, quitting smoking. Some medicines that help you quit smoking may also help delay weight gain. You can always lose the weight gained after you quit. · Bad mood or depression. There are a lot of ways to improve your mood other than smoking. If you are having problems with any of these situations, talk to your caregiver. SPECIAL SITUATIONS AND CONDITIONS  Studies suggest that everyone can quit smoking. Your situation or condition can give you a special reason to quit. · Pregnant women/new mothers: By quitting, you protect your baby's health and your own. · Hospitalized patients: By quitting, you reduce health problems and help healing. · Heart attack patients: By quitting, you reduce your risk of a second heart attack. · Lung, head, and neck cancer patients: By quitting, you reduce your chance of a second cancer. · Parents of children and adolescents: By quitting, you protect your children from illnesses caused by secondhand smoke. QUESTIONS TO THINK ABOUT  Think about the following questions before you try to stop smoking. You may want to talk about your answers with your caregiver. · Why do you want to quit? · If you tried to quit in the past, what helped and what did not? · What will be the most difficult situations for you after you quit?  How will

## 2018-04-17 ENCOUNTER — TELEPHONE (OUTPATIENT)
Dept: SLEEP MEDICINE | Age: 68
End: 2018-04-17

## 2018-04-17 ENCOUNTER — OFFICE VISIT (OUTPATIENT)
Dept: SLEEP MEDICINE | Age: 68
End: 2018-04-17

## 2018-04-17 VITALS
DIASTOLIC BLOOD PRESSURE: 70 MMHG | BODY MASS INDEX: 32.73 KG/M2 | SYSTOLIC BLOOD PRESSURE: 128 MMHG | TEMPERATURE: 55 F | HEIGHT: 69 IN | WEIGHT: 221 LBS | OXYGEN SATURATION: 99 %

## 2018-04-17 DIAGNOSIS — I10 HYPERTENSION, ESSENTIAL: Chronic | ICD-10-CM

## 2018-04-17 DIAGNOSIS — I25.10 CORONARY ARTERY DISEASE INVOLVING NATIVE CORONARY ARTERY OF NATIVE HEART WITHOUT ANGINA PECTORIS: Chronic | ICD-10-CM

## 2018-04-17 DIAGNOSIS — G47.33 OBSTRUCTIVE SLEEP APNEA SYNDROME: Primary | Chronic | ICD-10-CM

## 2018-04-17 DIAGNOSIS — E11.9 CONTROLLED TYPE 2 DIABETES MELLITUS WITHOUT COMPLICATION, UNSPECIFIED LONG TERM INSULIN USE STATUS: Chronic | ICD-10-CM

## 2018-04-17 PROCEDURE — G8598 ASA/ANTIPLAT THER USED: HCPCS | Performed by: NURSE PRACTITIONER

## 2018-04-17 PROCEDURE — G8417 CALC BMI ABV UP PARAM F/U: HCPCS | Performed by: NURSE PRACTITIONER

## 2018-04-17 PROCEDURE — 99214 OFFICE O/P EST MOD 30 MIN: CPT | Performed by: NURSE PRACTITIONER

## 2018-04-17 PROCEDURE — 3017F COLORECTAL CA SCREEN DOC REV: CPT | Performed by: NURSE PRACTITIONER

## 2018-04-17 PROCEDURE — 4004F PT TOBACCO SCREEN RCVD TLK: CPT | Performed by: NURSE PRACTITIONER

## 2018-04-17 PROCEDURE — 3046F HEMOGLOBIN A1C LEVEL >9.0%: CPT | Performed by: NURSE PRACTITIONER

## 2018-04-17 PROCEDURE — 2022F DILAT RTA XM EVC RTNOPTHY: CPT | Performed by: NURSE PRACTITIONER

## 2018-04-17 PROCEDURE — 4040F PNEUMOC VAC/ADMIN/RCVD: CPT | Performed by: NURSE PRACTITIONER

## 2018-04-17 PROCEDURE — G8427 DOCREV CUR MEDS BY ELIG CLIN: HCPCS | Performed by: NURSE PRACTITIONER

## 2018-04-17 PROCEDURE — 1123F ACP DISCUSS/DSCN MKR DOCD: CPT | Performed by: NURSE PRACTITIONER

## 2018-04-17 ASSESSMENT — SLEEP AND FATIGUE QUESTIONNAIRES
HOW LIKELY ARE YOU TO NOD OFF OR FALL ASLEEP WHILE SITTING QUIETLY AFTER LUNCH WITHOUT ALCOHOL: 0
ESS TOTAL SCORE: 4
HOW LIKELY ARE YOU TO NOD OFF OR FALL ASLEEP WHILE SITTING AND TALKING TO SOMEONE: 0
HOW LIKELY ARE YOU TO NOD OFF OR FALL ASLEEP WHILE SITTING INACTIVE IN A PUBLIC PLACE: 0
HOW LIKELY ARE YOU TO NOD OFF OR FALL ASLEEP WHILE WATCHING TV: 1
HOW LIKELY ARE YOU TO NOD OFF OR FALL ASLEEP WHILE LYING DOWN TO REST IN THE AFTERNOON WHEN CIRCUMSTANCES PERMIT: 2
HOW LIKELY ARE YOU TO NOD OFF OR FALL ASLEEP WHEN YOU ARE A PASSENGER IN A CAR FOR AN HOUR WITHOUT A BREAK: 0
HOW LIKELY ARE YOU TO NOD OFF OR FALL ASLEEP WHILE SITTING AND READING: 1
HOW LIKELY ARE YOU TO NOD OFF OR FALL ASLEEP IN A CAR, WHILE STOPPED FOR A FEW MINUTES IN TRAFFIC: 0

## 2018-04-17 ASSESSMENT — ENCOUNTER SYMPTOMS
RHINORRHEA: 0
COUGH: 0
APNEA: 0
SHORTNESS OF BREATH: 0
SINUS PRESSURE: 0
ABDOMINAL DISTENTION: 0
ABDOMINAL PAIN: 0

## 2018-04-25 ENCOUNTER — OFFICE VISIT (OUTPATIENT)
Dept: ORTHOPEDIC SURGERY | Age: 68
End: 2018-04-25

## 2018-04-25 VITALS
WEIGHT: 221 LBS | HEIGHT: 69 IN | SYSTOLIC BLOOD PRESSURE: 132 MMHG | RESPIRATION RATE: 16 BRPM | DIASTOLIC BLOOD PRESSURE: 68 MMHG | HEART RATE: 58 BPM | BODY MASS INDEX: 32.73 KG/M2

## 2018-04-25 DIAGNOSIS — M65.312 TRIGGER FINGER OF LEFT THUMB: Primary | ICD-10-CM

## 2018-04-25 PROCEDURE — 1123F ACP DISCUSS/DSCN MKR DOCD: CPT | Performed by: ORTHOPAEDIC SURGERY

## 2018-04-25 PROCEDURE — G8417 CALC BMI ABV UP PARAM F/U: HCPCS | Performed by: ORTHOPAEDIC SURGERY

## 2018-04-25 PROCEDURE — 20550 NJX 1 TENDON SHEATH/LIGAMENT: CPT | Performed by: ORTHOPAEDIC SURGERY

## 2018-04-25 PROCEDURE — 4004F PT TOBACCO SCREEN RCVD TLK: CPT | Performed by: ORTHOPAEDIC SURGERY

## 2018-04-25 PROCEDURE — 99214 OFFICE O/P EST MOD 30 MIN: CPT | Performed by: ORTHOPAEDIC SURGERY

## 2018-04-25 PROCEDURE — 3017F COLORECTAL CA SCREEN DOC REV: CPT | Performed by: ORTHOPAEDIC SURGERY

## 2018-04-25 PROCEDURE — G8598 ASA/ANTIPLAT THER USED: HCPCS | Performed by: ORTHOPAEDIC SURGERY

## 2018-04-25 PROCEDURE — 4040F PNEUMOC VAC/ADMIN/RCVD: CPT | Performed by: ORTHOPAEDIC SURGERY

## 2018-04-25 PROCEDURE — G8427 DOCREV CUR MEDS BY ELIG CLIN: HCPCS | Performed by: ORTHOPAEDIC SURGERY

## 2018-04-25 RX ORDER — ALPRAZOLAM 0.5 MG/1
TABLET ORAL
Refills: 0 | COMMUNITY
Start: 2018-04-16 | End: 2018-09-05 | Stop reason: CLARIF

## 2018-04-25 RX ORDER — HYDROCHLOROTHIAZIDE 25 MG/1
TABLET ORAL
Refills: 2 | COMMUNITY
Start: 2018-04-04

## 2018-09-05 ENCOUNTER — OFFICE VISIT (OUTPATIENT)
Dept: NEUROLOGY | Age: 68
End: 2018-09-05

## 2018-09-05 VITALS
WEIGHT: 212 LBS | HEART RATE: 60 BPM | BODY MASS INDEX: 31.4 KG/M2 | SYSTOLIC BLOOD PRESSURE: 127 MMHG | DIASTOLIC BLOOD PRESSURE: 72 MMHG | HEIGHT: 69 IN

## 2018-09-05 DIAGNOSIS — G40.109 PARTIAL SYMPTOMATIC EPILEPSY WITH SIMPLE PARTIAL SEIZURES, NOT INTRACTABLE, WITHOUT STATUS EPILEPTICUS (HCC): ICD-10-CM

## 2018-09-05 PROCEDURE — 99213 OFFICE O/P EST LOW 20 MIN: CPT | Performed by: PSYCHIATRY & NEUROLOGY

## 2018-09-05 RX ORDER — DIVALPROEX SODIUM 500 MG/1
TABLET, EXTENDED RELEASE ORAL
Qty: 270 TABLET | Refills: 1 | Status: SHIPPED | OUTPATIENT
Start: 2018-09-05 | End: 2019-02-22 | Stop reason: SDUPTHER

## 2018-09-07 RX ORDER — CLOPIDOGREL BISULFATE 75 MG/1
75 TABLET ORAL DAILY
Qty: 90 TABLET | Refills: 1 | Status: SHIPPED | OUTPATIENT
Start: 2018-09-07 | End: 2018-11-09 | Stop reason: SDUPTHER

## 2018-10-08 ENCOUNTER — HOSPITAL ENCOUNTER (EMERGENCY)
Age: 68
Discharge: HOME | End: 2018-10-08
Payer: COMMERCIAL

## 2018-10-08 ENCOUNTER — APPOINTMENT (OUTPATIENT)
Dept: GENERAL RADIOLOGY | Age: 68
End: 2018-10-08
Payer: COMMERCIAL

## 2018-10-08 VITALS
WEIGHT: 215 LBS | HEIGHT: 69 IN | BODY MASS INDEX: 31.84 KG/M2 | OXYGEN SATURATION: 97 % | SYSTOLIC BLOOD PRESSURE: 170 MMHG | DIASTOLIC BLOOD PRESSURE: 82 MMHG | TEMPERATURE: 98.1 F | RESPIRATION RATE: 15 BRPM | HEART RATE: 81 BPM

## 2018-10-08 DIAGNOSIS — L02.91 ABSCESS: Primary | ICD-10-CM

## 2018-10-08 PROCEDURE — 90715 TDAP VACCINE 7 YRS/> IM: CPT | Performed by: PHYSICIAN ASSISTANT

## 2018-10-08 PROCEDURE — 4500000023 HC ED LEVEL 3 PROCEDURE

## 2018-10-08 PROCEDURE — 99283 EMERGENCY DEPT VISIT LOW MDM: CPT

## 2018-10-08 PROCEDURE — 73140 X-RAY EXAM OF FINGER(S): CPT

## 2018-10-08 PROCEDURE — 6360000002 HC RX W HCPCS: Performed by: PHYSICIAN ASSISTANT

## 2018-10-08 PROCEDURE — 90471 IMMUNIZATION ADMIN: CPT | Performed by: PHYSICIAN ASSISTANT

## 2018-10-08 RX ORDER — BACITRACIN, NEOMYCIN, POLYMYXIN B 400; 3.5; 5 [USP'U]/G; MG/G; [USP'U]/G
OINTMENT TOPICAL
Status: DISCONTINUED
Start: 2018-10-08 | End: 2018-10-08 | Stop reason: HOSPADM

## 2018-10-08 RX ORDER — CEPHALEXIN 500 MG/1
500 CAPSULE ORAL 4 TIMES DAILY
Qty: 28 CAPSULE | Refills: 0 | Status: SHIPPED | OUTPATIENT
Start: 2018-10-08 | End: 2018-10-15

## 2018-10-08 RX ORDER — SULFAMETHOXAZOLE AND TRIMETHOPRIM 800; 160 MG/1; MG/1
1 TABLET ORAL 2 TIMES DAILY
Qty: 14 TABLET | Refills: 0 | Status: SHIPPED | OUTPATIENT
Start: 2018-10-08 | End: 2018-10-15

## 2018-10-08 RX ADMIN — TETANUS TOXOID, REDUCED DIPHTHERIA TOXOID AND ACELLULAR PERTUSSIS VACCINE, ADSORBED 0.5 ML: 5; 2.5; 8; 8; 2.5 SUSPENSION INTRAMUSCULAR at 09:46

## 2018-10-08 ASSESSMENT — ENCOUNTER SYMPTOMS
NAUSEA: 0
VOMITING: 0

## 2018-10-08 ASSESSMENT — PAIN DESCRIPTION - PAIN TYPE: TYPE: ACUTE PAIN

## 2018-10-08 ASSESSMENT — PAIN SCALES - GENERAL: PAINLEVEL_OUTOF10: 7

## 2018-10-08 NOTE — ED PROVIDER NOTES
years by Dr Ric Graham Epilepsy [facility])     Hyperlipidemia     Hypertension     Irritable bowel syndrome     Myofascial pain syndrome 1/7/2013    Obstructive sleep apnea (adult) (pediatric) 10/9/2014    Type II or unspecified type diabetes mellitus without mention of complication, not stated as uncontrolled          Procedure Laterality Date    CORONARY ANGIOPLASTY WITH STENT PLACEMENT  10/2013    ELBOW BURSA SURGERY Left 7/24/2014    EXCISION OF OLECRANON BURSA, LEFT ELBOW    LUNG BIOPSY         Medications:  Discharge Medication List as of 10/8/2018 10:57 AM      CONTINUE these medications which have NOT CHANGED    Details   clopidogrel (PLAVIX) 75 MG tablet TAKE 1 TABLET BY MOUTH DAILY, Disp-90 tablet, R-1Normal      divalproex (DEPAKOTE ER) 500 MG extended release tablet TAKE 3 TABLETS BY MOUTH DAILY, Disp-270 tablet, R-1Normal      hydrochlorothiazide (HYDRODIURIL) 25 MG tablet **2/1**TAKE 1 TABLET BY MOUTH EVERY DAY, R-2Historical Med      metoprolol tartrate (LOPRESSOR) 25 MG tablet TAKE 1 TABLET BY MOUTH 2 TIMES DAILY, Disp-180 tablet, R-1Normal      omeprazole (PRILOSEC) 20 MG delayed release capsule Take 20 mg by mouth dailyHistorical Med      atorvastatin (LIPITOR) 40 MG tablet Take 1 tablet by mouth nightly, Disp-90 tablet, R-3Normal      lisinopril (PRINIVIL;ZESTRIL) 20 MG tablet TAKE 1 TABLET BY MOUTH DAILY, Disp-90 tablet, R-3Normal      FLUoxetine (PROZAC) 20 MG capsule Take 1 capsule by mouth daily, Disp-30 capsule, R-2      tamsulosin (FLOMAX) 0.4 MG capsule TAKE ONE CAPSULE BY MOUTH EVERY DAY IN THE EVENING, Disp-30 capsule, R-2      diphenoxylate-atropine (LOMOTIL) 2.5-0.025 MG per tablet 1 tid, Disp-90 tablet, R-0      aspirin 81 MG tablet Take 81 mg by mouth daily      Blood Pressure Monitoring (BLOOD PRESSURE CUFF) MISC DAILY Starting 6/2/2017, Until Discontinued, Disp-1 each, R-0, Normal      hydrochlorothiazide (MICROZIDE) 12.5 MG capsule Take 12.5 mg by mouth every morning

## 2018-11-09 ENCOUNTER — TELEPHONE (OUTPATIENT)
Dept: CARDIOLOGY CLINIC | Age: 68
End: 2018-11-09

## 2018-11-09 RX ORDER — CLOPIDOGREL BISULFATE 75 MG/1
75 TABLET ORAL NIGHTLY
Qty: 90 TABLET | Refills: 1
Start: 2018-11-09 | End: 2019-12-02 | Stop reason: ALTCHOICE

## 2018-11-09 NOTE — TELEPHONE ENCOUNTER
Per Dr. Sal Martines > patient can take both medications, but should separate them (one in morning and one in evening, ~ 12 hrs apart if possible). This info was given to pharmacy intern. Patient also notified of information. He will start taking Plavix in pm starting tomorrow.

## 2018-11-09 NOTE — TELEPHONE ENCOUNTER
Spoke to pharmacist and informed him that Dr. Charlie Beverly is not in the office today, but will return on Monday Nov 12th. Patient is on Plavix for his history of TIA. Pharmacist said they could wait until Dr. Charlie Beverly returns to office to get his recommendations regarding Plavix and Omeprazole.

## 2019-02-22 ENCOUNTER — TELEPHONE (OUTPATIENT)
Dept: CARDIOLOGY CLINIC | Age: 69
End: 2019-02-22

## 2019-02-22 RX ORDER — CLOPIDOGREL BISULFATE 75 MG/1
75 TABLET ORAL DAILY
Qty: 90 TABLET | Refills: 1 | Status: SHIPPED | OUTPATIENT
Start: 2019-02-22 | End: 2019-02-25 | Stop reason: SDUPTHER

## 2019-02-25 RX ORDER — CLOPIDOGREL BISULFATE 75 MG/1
75 TABLET ORAL DAILY
Qty: 90 TABLET | Refills: 1 | Status: SHIPPED | OUTPATIENT
Start: 2019-02-25 | End: 2019-03-27 | Stop reason: SDUPTHER

## 2019-03-22 ENCOUNTER — HOSPITAL ENCOUNTER (OUTPATIENT)
Age: 69
Discharge: HOME | End: 2019-03-22
Payer: COMMERCIAL

## 2019-03-22 DIAGNOSIS — G40.109 PARTIAL SYMPTOMATIC EPILEPSY WITH SIMPLE PARTIAL SEIZURES, NOT INTRACTABLE, WITHOUT STATUS EPILEPTICUS (HCC): ICD-10-CM

## 2019-03-22 LAB
ALT SERPL-CCNC: 13 U/L (ref 10–40)
AST SERPL-CCNC: 14 U/L (ref 15–37)
BASOPHILS ABSOLUTE: 0.1 K/UL (ref 0–0.2)
BASOPHILS RELATIVE PERCENT: 1 %
EOSINOPHILS ABSOLUTE: 0.2 K/UL (ref 0–0.6)
EOSINOPHILS RELATIVE PERCENT: 2.5 %
HCT VFR BLD CALC: 44.5 % (ref 40.5–52.5)
HEMOGLOBIN: 15.3 G/DL (ref 13.5–17.5)
LYMPHOCYTES ABSOLUTE: 2.2 K/UL (ref 1–5.1)
LYMPHOCYTES RELATIVE PERCENT: 35.3 %
MCH RBC QN AUTO: 31 PG (ref 26–34)
MCHC RBC AUTO-ENTMCNC: 34.4 G/DL (ref 31–36)
MCV RBC AUTO: 90 FL (ref 80–100)
MONOCYTES ABSOLUTE: 0.6 K/UL (ref 0–1.3)
MONOCYTES RELATIVE PERCENT: 9.2 %
NEUTROPHILS ABSOLUTE: 3.2 K/UL (ref 1.7–7.7)
NEUTROPHILS RELATIVE PERCENT: 52 %
PDW BLD-RTO: 13.6 % (ref 12.4–15.4)
PLATELET # BLD: 125 K/UL (ref 135–450)
PMV BLD AUTO: 9.5 FL (ref 5–10.5)
RBC # BLD: 4.94 M/UL (ref 4.2–5.9)
VALPROIC ACID LEVEL: 83.9 UG/ML (ref 50–100)
WBC # BLD: 6.2 K/UL (ref 4–11)

## 2019-03-22 PROCEDURE — 84450 TRANSFERASE (AST) (SGOT): CPT

## 2019-03-22 PROCEDURE — 84460 ALANINE AMINO (ALT) (SGPT): CPT

## 2019-03-22 PROCEDURE — 36415 COLL VENOUS BLD VENIPUNCTURE: CPT

## 2019-03-22 PROCEDURE — 80164 ASSAY DIPROPYLACETIC ACD TOT: CPT

## 2019-03-22 PROCEDURE — 85025 COMPLETE CBC W/AUTO DIFF WBC: CPT

## 2019-03-27 ENCOUNTER — OFFICE VISIT (OUTPATIENT)
Dept: CARDIOLOGY CLINIC | Age: 69
End: 2019-03-27
Payer: COMMERCIAL

## 2019-03-27 VITALS
WEIGHT: 219 LBS | DIASTOLIC BLOOD PRESSURE: 70 MMHG | BODY MASS INDEX: 32.44 KG/M2 | OXYGEN SATURATION: 94 % | HEART RATE: 75 BPM | HEIGHT: 69 IN | SYSTOLIC BLOOD PRESSURE: 136 MMHG

## 2019-03-27 DIAGNOSIS — Z95.5 STATUS POST INSERTION OF DRUG-ELUTING STENT INTO RIGHT CORONARY ARTERY FOR CORONARY ARTERY DISEASE: Chronic | ICD-10-CM

## 2019-03-27 DIAGNOSIS — E78.5 DYSLIPIDEMIA: ICD-10-CM

## 2019-03-27 DIAGNOSIS — I25.10 CORONARY ARTERY DISEASE DUE TO LIPID RICH PLAQUE: Primary | ICD-10-CM

## 2019-03-27 DIAGNOSIS — I10 ESSENTIAL HYPERTENSION: Chronic | ICD-10-CM

## 2019-03-27 DIAGNOSIS — Z95.5 STATUS POST INSERTION OF DRUG ELUTING CORONARY ARTERY STENT: ICD-10-CM

## 2019-03-27 DIAGNOSIS — I25.83 CORONARY ARTERY DISEASE DUE TO LIPID RICH PLAQUE: Primary | ICD-10-CM

## 2019-03-27 PROCEDURE — 93000 ELECTROCARDIOGRAM COMPLETE: CPT | Performed by: INTERNAL MEDICINE

## 2019-03-27 PROCEDURE — 99214 OFFICE O/P EST MOD 30 MIN: CPT | Performed by: INTERNAL MEDICINE

## 2019-03-27 RX ORDER — ATORVASTATIN CALCIUM 80 MG/1
80 TABLET, FILM COATED ORAL DAILY
COMMUNITY
End: 2020-04-28 | Stop reason: SDUPTHER

## 2019-03-27 RX ORDER — OMEGA-3-ACID ETHYL ESTERS 1 G/1
2 CAPSULE, LIQUID FILLED ORAL 2 TIMES DAILY
COMMUNITY
End: 2020-06-25

## 2019-04-02 ENCOUNTER — OFFICE VISIT (OUTPATIENT)
Dept: NEUROLOGY | Age: 69
End: 2019-04-02
Payer: COMMERCIAL

## 2019-04-02 VITALS
HEART RATE: 80 BPM | WEIGHT: 215 LBS | HEIGHT: 69 IN | BODY MASS INDEX: 31.84 KG/M2 | DIASTOLIC BLOOD PRESSURE: 73 MMHG | SYSTOLIC BLOOD PRESSURE: 132 MMHG

## 2019-04-02 DIAGNOSIS — D69.6 THROMBOCYTOPENIA (HCC): ICD-10-CM

## 2019-04-02 DIAGNOSIS — G40.109 PARTIAL SYMPTOMATIC EPILEPSY WITH SIMPLE PARTIAL SEIZURES, NOT INTRACTABLE, WITHOUT STATUS EPILEPTICUS (HCC): Primary | ICD-10-CM

## 2019-04-02 PROCEDURE — 99213 OFFICE O/P EST LOW 20 MIN: CPT | Performed by: PSYCHIATRY & NEUROLOGY

## 2019-04-02 RX ORDER — DIVALPROEX SODIUM 500 MG/1
TABLET, EXTENDED RELEASE ORAL
Qty: 270 TABLET | Refills: 1 | Status: SHIPPED | OUTPATIENT
Start: 2019-04-02 | End: 2019-09-26 | Stop reason: SDUPTHER

## 2019-04-02 NOTE — PROGRESS NOTES
0.50     Years: 10.00     Pack years: 5.00     Types: Cigarettes     Last attempt to quit: 2017     Years since quittin.8    Smokeless tobacco: Never Used   Substance and Sexual Activity    Alcohol use: No    Drug use: No    Sexual activity: Yes     Partners: Female   Lifestyle    Physical activity:     Days per week: None     Minutes per session: None    Stress: None   Relationships    Social connections:     Talks on phone: None     Gets together: None     Attends Restorationist service: None     Active member of club or organization: None     Attends meetings of clubs or organizations: None     Relationship status: None    Intimate partner violence:     Fear of current or ex partner: None     Emotionally abused: None     Physically abused: None     Forced sexual activity: None   Other Topics Concern    None   Social History Narrative    None        Objective:  Exam:  /73 (Site: Right Upper Arm, Position: Sitting, Cuff Size: Large Adult)   Pulse 80   Ht 5' 9\" (1.753 m)   Wt 215 lb (97.5 kg)   BMI 31.75 kg/m²   This is a well-nourished patient in no acute distress  Patient is awake, alert and oriented x3. Speech is normal.  Pupils are equal round reacting to light. Extraocular movements intact. Face symmetrical. Tongue midline. Motor exam shows normal symmetrical strength. Deep tendon reflexes normal. Plantar reflexes downgoing. Sensory exam normal. Coordination normal. Gait normal. No carotid bruit. No neck stiffness.       Data :  LABS:  General Labs:    CBC:   Lab Results   Component Value Date    WBC 6.2 2019    RBC 4.94 2019    HGB 15.3 2019    HCT 44.5 2019    MCV 90.0 2019    MCH 31.0 2019    MCHC 34.4 2019    RDW 13.6 2019     2019    MPV 9.5 2019     BMP:    Lab Results   Component Value Date     2017    K 3.9 2017    CL 98 2017    CO2 24 2017    BUN 23 2017    LABALBU 4.3

## 2019-04-02 NOTE — PATIENT INSTRUCTIONS
Please call with any questions or concerns:   [facility] Neurology  @ [phone]. LAB RESULTS:  Please obtain any labs or diagnostic tests as discussed today. You should call the office to check the results. Please allow  3 to 7 days for us to get these results. MEDICATION LIST:  Please bring an accurate list of your medications to every visit. APPOINTMENT CONFIRMATION:  We will call you the day before your scheduled appointment to confirm. If we are unable to reach you, you MUST call back by the end of the day to confirm the appointment or we may be forced to cancel.

## 2019-04-22 ENCOUNTER — OFFICE VISIT (OUTPATIENT)
Dept: PULMONOLOGY | Age: 69
End: 2019-04-22
Payer: COMMERCIAL

## 2019-04-22 VITALS
HEART RATE: 68 BPM | SYSTOLIC BLOOD PRESSURE: 130 MMHG | BODY MASS INDEX: 32.05 KG/M2 | DIASTOLIC BLOOD PRESSURE: 70 MMHG | OXYGEN SATURATION: 97 % | WEIGHT: 217 LBS

## 2019-04-22 DIAGNOSIS — K21.9 GASTROESOPHAGEAL REFLUX DISEASE WITHOUT ESOPHAGITIS: ICD-10-CM

## 2019-04-22 DIAGNOSIS — G47.33 OBSTRUCTIVE SLEEP APNEA: Primary | Chronic | ICD-10-CM

## 2019-04-22 DIAGNOSIS — I25.83 CORONARY ARTERY DISEASE DUE TO LIPID RICH PLAQUE: ICD-10-CM

## 2019-04-22 DIAGNOSIS — I10 ESSENTIAL HYPERTENSION: Chronic | ICD-10-CM

## 2019-04-22 DIAGNOSIS — I25.10 CORONARY ARTERY DISEASE DUE TO LIPID RICH PLAQUE: ICD-10-CM

## 2019-04-22 PROCEDURE — 99214 OFFICE O/P EST MOD 30 MIN: CPT | Performed by: NURSE PRACTITIONER

## 2019-04-22 ASSESSMENT — SLEEP AND FATIGUE QUESTIONNAIRES
HOW LIKELY ARE YOU TO NOD OFF OR FALL ASLEEP WHEN YOU ARE A PASSENGER IN A CAR FOR AN HOUR WITHOUT A BREAK: 2
HOW LIKELY ARE YOU TO NOD OFF OR FALL ASLEEP WHILE SITTING AND READING: 1
ESS TOTAL SCORE: 6
HOW LIKELY ARE YOU TO NOD OFF OR FALL ASLEEP WHILE SITTING AND TALKING TO SOMEONE: 0
HOW LIKELY ARE YOU TO NOD OFF OR FALL ASLEEP WHILE SITTING QUIETLY AFTER LUNCH WITHOUT ALCOHOL: 0
HOW LIKELY ARE YOU TO NOD OFF OR FALL ASLEEP WHILE LYING DOWN TO REST IN THE AFTERNOON WHEN CIRCUMSTANCES PERMIT: 1
HOW LIKELY ARE YOU TO NOD OFF OR FALL ASLEEP WHILE WATCHING TV: 1
HOW LIKELY ARE YOU TO NOD OFF OR FALL ASLEEP WHILE SITTING INACTIVE IN A PUBLIC PLACE: 1
HOW LIKELY ARE YOU TO NOD OFF OR FALL ASLEEP IN A CAR, WHILE STOPPED FOR A FEW MINUTES IN TRAFFIC: 0

## 2019-04-22 ASSESSMENT — ENCOUNTER SYMPTOMS
RHINORRHEA: 0
SHORTNESS OF BREATH: 0
ABDOMINAL DISTENTION: 0
APNEA: 0
EYE PAIN: 0
EYE REDNESS: 0
ABDOMINAL PAIN: 0
COUGH: 0
SINUS PRESSURE: 0

## 2019-04-22 NOTE — LETTER
[facility] Sleep Medicine  [address] [place] [id number]  Phone: [phone]  Fax: [phone]    April 22, 2019       Patient: Yuliet Alexander   MR Number: [id number]   YOB: 1950   Date of Visit: 4/22/2019       Lianet Montiel was seen for a follow up visit today. Here is my assessment and plan as well as an attached copy of his visit today:    Uncontrolled type 2 diabetes mellitus with complication, without long-term current use of insulin ([address]  Chronic. Cont meds per PCP and other physicians. Coronary artery disease due to lipid rich plaque  Chronic- Stable. Cont meds per PCP and other physicians. GERD (gastroesophageal reflux disease)  Chronic- Stable. Cont meds per PCP and other physicians. Essential hypertension  Chronic- Stable. Cont meds per PCP and other physicians. Obstructive sleep apnea  Reviewed compliance download with pt. Supplies and parts as needed for his machine. These are medically necessary. Continue medications per his PCP and other physicians. Limit caffeine use after 3pm.  Encouraged him to work on weight loss through diet and exercise. Diagnoses of Uncontrolled type 2 diabetes mellitus with complication, without long-term current use of insulin ([address] Coronary artery disease due to lipid rich plaque, Gastroesophageal reflux disease without esophagitis, and Essential hypertension were pertinent to this visit. The chronic medical conditions listed are directly related to the primary diagnosis listed above. The management of the primary diagnosis affects the secondary diagnosis and vice versa. If you have questions or concerns, please do not hesitate to call me. I look forward to following Daniel Tanner along with you.     Sincerely,    KEYUR Velasco - CNP    CC providers:  Howard Duenas MD  [address] [place] [address]

## 2019-04-22 NOTE — ASSESSMENT & PLAN NOTE
Reviewed compliance download with pt. Supplies and parts as needed for his machine. These are medically necessary. Continue medications per his PCP and other physicians. Limit caffeine use after 3pm.  Encouraged him to work on weight loss through diet and exercise. Diagnoses of Uncontrolled type 2 diabetes mellitus with complication, without long-term current use of insulin ([facility] [address] Coronary artery disease due to lipid rich plaque, Gastroesophageal reflux disease without esophagitis, and Essential hypertension were pertinent to this visit. The chronic medical conditions listed are directly related to the primary diagnosis listed above. The management of the primary diagnosis affects the secondary diagnosis and vice versa.

## 2019-04-22 NOTE — PROGRESS NOTES
Reese Roberts MD, FAASM, [facility]  Mary Bolton, MSN, RN, CNP     [address] [place] [address] SLEEP MEDICINE  [address] [place] [id number]  Dept: [phone]  Dept Fax: : [phone] [facility]  [address] [phone]    Subjective:     Patient ID: Sharlene Phillips is a 80 y.o. male. Chief Complaint   Patient presents with    Other     cfu       HPI:      Machine Present today:  Yes    Machine Modem/Download Info:  Compliance (hours/night): 5.25 hrs/night  Download AHI (/hour): 2.1 /HR  Average CPAP Pressure : 12.2 cmH2O           APAP - Settings  Pressure Min: 11 cmH2O  Pressure Max: 15 cmH2O                 Comfort Settings  Humidity Level (0-8): 2  Heated Tubing (Yes/No): No  Flex/EPR (0-3): 1       [place] - Total score: 6    Follow-up :     Last Visit : April 2018      Patient reports the listed chronic Co-morbidities: HTN, DM, GERD, CAD   are well controlled and stable at this time. Subjective Health Changes: None     Over Night Oximetry: [] Yes  [] No  [x] NA   Using O2: [] Yes  [] No  [x] NA   Patient is compliant with the machine  [x] Yes  [] No In the spring and fall   Feeling rested when using the machine   [x] Yes  [] No     Pressure is comfortable with inspiration and expiration  [x] Yes  [] No     Noticed changes in pressure   [] Yes  [] No  [x] NA   Mask is fitting well  [x] Yes  [] No   Noting Mask Air Leak  [x] Yes  [] No   Having painful Aerophagia  [] Yes  [x] No   Nocturia   1-2  per night.    Having  HA upon waking  [] Yes  [x] No   Dry mouth upon waking   Dry Nose  Dry Eyes  [] Yes  [x] No   Congestion upon waking   [] Yes  [x] No    Nose Bleeds  [] Yes  [x] No   Using Sleep Aides    [x] NA   Understands how to change humidification and/or tubing temperature for comfort while at home  [x] Yes  [] No     Difficulties falling asleep [] Yes  [x] No   Difficulties staying asleep  [] Yes  [x] No   Approximate time to bed  9pm   Approximate wake time  7-8am   Taking Naps  occasional   If taking naps usual length  60 minutes  [] NA   If taking naps using the machine  [] Yes  [x] No  [] NA   Drowsy when driving  [] Yes  [x] No     Does patient carry a DOT/CDL  [] Yes  [x] No     Does patient carry FAA/Pilots License   [] Yes  [x] No      Any concerns noted with the machine at this time  [] Yes  [x] No        Diagnosis Orders   1. Obstructive sleep apnea     2. Uncontrolled type 2 diabetes mellitus with complication, without long-term current use of insulin ([facility] [address]     3. Coronary artery disease due to lipid rich plaque     4. Gastroesophageal reflux disease without esophagitis     5. Essential hypertension         The chronic medical conditions listed are directly related to the primary diagnosis listed above. The management of the primary diagnosis affects the secondary diagnosis and vice versa. Review of Systems   Constitutional: Negative for appetite change, chills, fatigue and fever. HENT: Negative for congestion, nosebleeds, rhinorrhea and sinus pressure. Eyes: Negative for pain and redness. Respiratory: Negative for apnea, cough and shortness of breath. Cardiovascular: Negative for chest pain and palpitations. Gastrointestinal: Negative for abdominal distention and abdominal pain. Neurological: Negative for dizziness and headaches. Psychiatric/Behavioral: Negative for sleep disturbance.        Social History     Socioeconomic History    Marital status: Single     Spouse name: Not on file    Number of children: Not on file    Years of education: Not on file    Highest education level: Not on file   Occupational History    Not on file   Social Needs    Financial resource strain: Not on file    Food insecurity:     Worry: Not on file     Inability: Not on file    Transportation needs:     Medical: Not on file Non-medical: Not on file   Tobacco Use    Smoking status: Current Some Day Smoker     Packs/day: 0.50     Years: 10.00     Pack years: 5.00     Types: Cigarettes     Last attempt to quit: 2017     Years since quittin.9    Smokeless tobacco: Never Used   Substance and Sexual Activity    Alcohol use: No    Drug use: No    Sexual activity: Yes     Partners: Female   Lifestyle    Physical activity:     Days per week: Not on file     Minutes per session: Not on file    Stress: Not on file   Relationships    Social connections:     Talks on phone: Not on file     Gets together: Not on file     Attends Restorationist service: Not on file     Active member of club or organization: Not on file     Attends meetings of clubs or organizations: Not on file     Relationship status: Not on file    Intimate partner violence:     Fear of current or ex partner: Not on file     Emotionally abused: Not on file     Physically abused: Not on file     Forced sexual activity: Not on file   Other Topics Concern    Not on file   Social History Narrative    Not on file       Prior to Admission medications    Medication Sig Start Date End Date Taking?  Authorizing Provider   divalproex (DEPAKOTE ER) 500 MG extended release tablet TAKE 3 TABLETS BY MOUTH EVERY DAY 19  Yes Pedro Cardenas MD   omega-3 acid ethyl esters (LOVAZA) 1 g capsule Take 2 g by mouth 2 times daily   Yes Historical Provider, MD   atorvastatin (LIPITOR) 80 MG tablet Take 80 mg by mouth daily   Yes Historical Provider, MD   clopidogrel (PLAVIX) 75 MG tablet Take 1 tablet by mouth nightly 18  Yes Jeanette Rubinstein, MD   hydrochlorothiazide (HYDRODIURIL) 25 MG tablet TAKE 1 TABLET BY MOUTH EVERY DAY 18  Yes Historical Provider, MD   metoprolol tartrate (LOPRESSOR) 25 MG tablet TAKE 1 TABLET BY MOUTH 2 TIMES DAILY 18  Yes Jeanette Rubinstein, MD   omeprazole (PRILOSEC) 20 MG delayed release capsule Take 20 mg by mouth daily   Yes Historical Provider, MD TIA involving right internal carotid artery    Uncontrolled type 2 diabetes mellitus with complication, without long-term current use of insulin (HCC)    Dyslipidemia    Left sided numbness    Trigger finger of left thumb    Epilepsy ([facility] [address]       Past Medical History:   Diagnosis Date    Anxiety     BPH (benign prostatic hyperplasia)     CAD (coronary artery disease) 10/25/2013    s/p angioplsty 2    Chronic back pain oct 2008    in [place] [id number]    DDD (degenerative disc disease), lumbosacral 1/7/2013    Depression     Eosinophilic granuloma ([facility] [address]     sp biopsy 10 years by Dr Willie David [facility]ile[facility])     Hyperlipidemia     Hypertension     Irritable bowel syndrome     Myofascial pain syndrome 1/7/2013    Obstructive sleep apnea (adult) (pediatric) 10/9/2014    Type II or unspecified type diabetes mellitus without mention of complication, not stated as uncontrolled        Past Surgical History:   Procedure Laterality Date    CORONARY ANGIOPLASTY WITH STENT PLACEMENT  10/2013    ELBOW BURSA SURGERY Left 7/24/2014    EXCISION OF OLECRANON BURSA, LEFT ELBOW    LUNG BIOPSY         Family History   Problem Relation Age of Onset    High Blood Pressure Mother     Diabetes Father     Heart Disease Father     High Blood Pressure Father        Vitals:  Weight BMI   Wt Readings from Last 3 Encounters:   04/22/19 217 lb (98.4 kg)   04/02/19 215 lb (97.5 kg)   03/27/19 219 lb (99.3 kg)    Body mass index is 32.05 kg/m². BP HR SaO2   BP Readings from Last 3 Encounters:   04/22/19 130/70   04/02/19 132/73   03/27/19 136/70    Pulse Readings from Last 3 Encounters:   04/22/19 68   04/02/19 80   03/27/19 75    SpO2 Readings from Last 3 Encounters:   04/22/19 97%   03/27/19 94%   10/08/18 97%        Assessment/Plan:     Uncontrolled type 2 diabetes mellitus with complication, without long-term current use of insulin (HCC)  Chronic. Cont meds per PCP and other physicians.       Coronary artery disease due to lipid rich plaque  Chronic- Stable. Cont meds per PCP and other physicians. GERD (gastroesophageal reflux disease)  Chronic- Stable. Cont meds per PCP and other physicians. Essential hypertension  Chronic- Stable. Cont meds per PCP and other physicians. Obstructive sleep apnea  Reviewed compliance download with pt. Supplies and parts as needed for his machine. These are medically necessary. Continue medications per his PCP and other physicians. Limit caffeine use after 3pm.  Encouraged him to work on weight loss through diet and exercise. Diagnoses of Uncontrolled type 2 diabetes mellitus with complication, without long-term current use of insulin ([address] Coronary artery disease due to lipid rich plaque, Gastroesophageal reflux disease without esophagitis, and Essential hypertension were pertinent to this visit. The chronic medical conditions listed are directly related to the primary diagnosis listed above. The management of the primary diagnosis affects the secondary diagnosis and vice versa. The primary encounter diagnosis was Obstructive sleep apnea. Diagnoses of Uncontrolled type 2 diabetes mellitus with complication, without long-term current use of insulin ([address] Coronary artery disease due to lipid rich plaque, Gastroesophageal reflux disease without esophagitis, and Essential hypertension were also pertinent to this visit. The chronic medical conditions listed are directly related to the primary diagnosis listed above. The management of the primary diagnosis affects the secondary diagnosis and vice versa. - Educated patient and reviewed compliance download with pt.    -Supplies and parts as needed for his machine, these are medically necessary.    - Patient using [employer] for supplies  -Continue medications per his PCP and other physicians.   -Limit caffeine use after 3pm.    -Encouraged him to work on weight loss through diet and exercise.   - Educated on sleep

## 2019-09-10 NOTE — PROGRESS NOTES
[address]    2019    Marisela Salmon (:  1950) is a 77 y.o. male who is here for six month follow up for the management of coronary artery disease and modifiable risk factors. Referring Provider: Jonnie Drummond MD    HISTORY:  Mr. Jorge Luis Montes has a history of coronary artery disease with CODEY to Diag-1 and RCA in . Additional history includes HTN, Hyperlipidemia, DM. Sleep apnea is treated with CPAP. He follows with Dr. Edi Steward for partial simples seizures. Today, he states he has not felt well for the past two weeks. He feels lethargic, weak, and feels like he is hot at times. He feels light headed when he stands too long. He took a nap yesterday afternoon after feeling light headed when sitting in a chair. He is not very hungry and has lost weight (~ 5 lbs since April). He drank Power Aid yesterday as he thought he may be dehydrated. He is moving slowly and thinks he should go back to his chiropractor to stretch out his hamstrings. He denies exertional chest pain or palpitations. He is mildly short of breath. Patient is compliant with medications and is tolerating them well without side effects. REVIEW OF SYSTEMS:  A complete review of systems was reviewed and is negative except as noted in the history of present illness. Prior to Visit Medications    Medication Sig Taking?  Authorizing Provider   Katlyn Screen 640 MG/ML SOAJ INJECT 1 PEN SC EVERY 2 WEEKS Yes Historical Provider, MD   VASCEPA 1 g CAPS capsule TAKE 2 CAPSULES BY MOUTH TWICE A DAY WITH FOOD Yes Historical Provider, MD   potassium chloride (KLOR-CON) 10 MEQ extended release tablet TAKE 1 TABLET BY MOUTH EVERY DAY Yes Historical Provider, MD   aspirin EC 81 MG EC tablet Take 1 tablet by mouth daily Yes Pop Sherwood MD   divalproex (DEPAKOTE ER) 500 MG extended release tablet TAKE 3 TABLETS BY MOUTH EVERY DAY Yes Moisés Mcfadden MD   omega-3 acid ethyl esters (LOVAZA) 1 g capsule Take 2 g by mouth 2 times daily Yes Historical Provider, MD   atorvastatin (LIPITOR) 80 MG tablet Take 80 mg by mouth daily Yes Historical Provider, MD   clopidogrel (PLAVIX) 75 MG tablet Take 1 tablet by mouth nightly Yes Tariq Lang MD   hydrochlorothiazide (HYDRODIURIL) 25 MG tablet TAKE 1 TABLET BY MOUTH EVERY DAY Yes Historical Provider, MD   metoprolol tartrate (LOPRESSOR) 25 MG tablet TAKE 1 TABLET BY MOUTH 2 TIMES DAILY Yes Tariq Lang MD   omeprazole (PRILOSEC) 20 MG delayed release capsule Take 20 mg by mouth daily Yes Historical Provider, MD   lisinopril (PRINIVIL;ZESTRIL) 20 MG tablet TAKE 1 TABLET BY MOUTH DAILY Yes Tariq Lang MD   Blood Pressure Monitoring (BLOOD PRESSURE CUFF) MISC 1 Units by Does not apply route daily Yes Tariq Lang MD   FLUoxetine (PROZAC) 20 MG capsule Take 1 capsule by mouth daily Yes Yefri Mccarty MD   tamsulosin (FLOMAX) 0.4 MG capsule TAKE ONE CAPSULE BY MOUTH EVERY DAY IN THE EVENING Yes Yefri Mccarty MD   diphenoxylate-atropine (LOMOTIL) 2.5-0.025 MG per tablet 1 tid Yes Yefri Mccarty MD        Allergies   Allergen Reactions    Penicillins Hives    Benadryl [Diphenhydramine Hcl] Rash    Erythromycin Rash    Iodine Rash       Past Medical History:   Diagnosis Date    Anxiety     BPH (benign prostatic hyperplasia)     CAD (coronary artery disease) 10/25/2013    s/p angioplsty 2    Chronic back pain oct 2008    in [place] [address]    DDD (degenerative disc disease), lumbosacral 1/7/2013    Depression     Eosinophilic granuloma ([address]     sp biopsy 10 years by Dr Jacy Reyes Epilepsy [facility])     Hyperlipidemia     Hypertension     Irritable bowel syndrome     Myofascial pain syndrome 1/7/2013    Obstructive sleep apnea (adult) (pediatric) 10/9/2014    Type II or unspecified type diabetes mellitus without mention of complication, not stated as uncontrolled        Past Surgical History:   Procedure Laterality Date    CORONARY ANGIOPLASTY WITH STENT PLACEMENT  10/2013    of symptoms is unclear .      ~ will check CXR to rule out pneumonia and labs to check for sign of infection, dehydration, anemia, abnormal thyroid function    Plan > check CXR, labs - CMP, CBC, TSH, CK      Plan:  1. Chest X-ray today  2. Labs today - CBC, CMP, TSH, CK .   3. Add enteric coated aspirin daily  4. NP in one month  5. Follow up with MD in late spring to summer 2020      Scribe's attestation: This note was scribed in the presence of Cinthia Meza M.D. by Garry Vaughan RN    Physician Attestation: The scribe's documentation has been prepared under my direction and personally reviewed by me in its entirety. I confirm that the note above accurately reflects all work, treatment, procedures, and medical decision making performed by me. An  electronic signature was used to authenticate this note. Julsisa Carter MD, [facility], [address]

## 2019-09-11 ENCOUNTER — HOSPITAL ENCOUNTER (OUTPATIENT)
Age: 69
Discharge: HOME | End: 2019-09-11
Payer: COMMERCIAL

## 2019-09-11 ENCOUNTER — TELEPHONE (OUTPATIENT)
Dept: CARDIOLOGY CLINIC | Age: 69
End: 2019-09-11

## 2019-09-11 ENCOUNTER — OFFICE VISIT (OUTPATIENT)
Dept: CARDIOLOGY CLINIC | Age: 69
End: 2019-09-11
Payer: COMMERCIAL

## 2019-09-11 ENCOUNTER — HOSPITAL ENCOUNTER (OUTPATIENT)
Dept: GENERAL RADIOLOGY | Age: 69
Discharge: HOME | End: 2019-09-11
Payer: COMMERCIAL

## 2019-09-11 VITALS
WEIGHT: 210 LBS | DIASTOLIC BLOOD PRESSURE: 70 MMHG | HEART RATE: 60 BPM | BODY MASS INDEX: 31.1 KG/M2 | SYSTOLIC BLOOD PRESSURE: 124 MMHG | OXYGEN SATURATION: 96 % | HEIGHT: 69 IN

## 2019-09-11 DIAGNOSIS — I10 ESSENTIAL HYPERTENSION: Chronic | ICD-10-CM

## 2019-09-11 DIAGNOSIS — R06.02 SHORTNESS OF BREATH: ICD-10-CM

## 2019-09-11 DIAGNOSIS — I25.83 CORONARY ARTERY DISEASE DUE TO LIPID RICH PLAQUE: Primary | ICD-10-CM

## 2019-09-11 DIAGNOSIS — I25.10 CORONARY ARTERY DISEASE DUE TO LIPID RICH PLAQUE: Primary | ICD-10-CM

## 2019-09-11 DIAGNOSIS — R53.81 OTHER MALAISE: ICD-10-CM

## 2019-09-11 DIAGNOSIS — E78.5 DYSLIPIDEMIA: ICD-10-CM

## 2019-09-11 DIAGNOSIS — R42 DIZZINESS: ICD-10-CM

## 2019-09-11 DIAGNOSIS — Z95.5 STATUS POST INSERTION OF DRUG ELUTING CORONARY ARTERY STENT: ICD-10-CM

## 2019-09-11 LAB
A/G RATIO: 1.5 (ref 1.1–2.2)
ALBUMIN SERPL-MCNC: 4.3 G/DL (ref 3.4–5)
ALP BLD-CCNC: 87 U/L (ref 40–129)
ALT SERPL-CCNC: 16 U/L (ref 10–40)
ANION GAP SERPL CALCULATED.3IONS-SCNC: 11 MMOL/L (ref 3–16)
AST SERPL-CCNC: 13 U/L (ref 15–37)
BASOPHILS ABSOLUTE: 0 K/UL (ref 0–0.2)
BASOPHILS RELATIVE PERCENT: 0.8 %
BILIRUB SERPL-MCNC: 0.3 MG/DL (ref 0–1)
BUN BLDV-MCNC: 18 MG/DL (ref 7–20)
CALCIUM SERPL-MCNC: 9.6 MG/DL (ref 8.3–10.6)
CHLORIDE BLD-SCNC: 96 MMOL/L (ref 99–110)
CO2: 28 MMOL/L (ref 21–32)
CREAT SERPL-MCNC: 1.2 MG/DL (ref 0.8–1.3)
EOSINOPHILS ABSOLUTE: 0.1 K/UL (ref 0–0.6)
EOSINOPHILS RELATIVE PERCENT: 2.3 %
GFR AFRICAN AMERICAN: >60
GFR NON-AFRICAN AMERICAN: >60
GLOBULIN: 2.9 G/DL
GLUCOSE BLD-MCNC: 417 MG/DL (ref 70–99)
HCT VFR BLD CALC: 42.1 % (ref 40.5–52.5)
HEMOGLOBIN: 14.6 G/DL (ref 13.5–17.5)
LYMPHOCYTES ABSOLUTE: 2 K/UL (ref 1–5.1)
LYMPHOCYTES RELATIVE PERCENT: 34 %
MCH RBC QN AUTO: 31 PG (ref 26–34)
MCHC RBC AUTO-ENTMCNC: 34.6 G/DL (ref 31–36)
MCV RBC AUTO: 89.6 FL (ref 80–100)
MONOCYTES ABSOLUTE: 0.5 K/UL (ref 0–1.3)
MONOCYTES RELATIVE PERCENT: 9 %
NEUTROPHILS ABSOLUTE: 3.1 K/UL (ref 1.7–7.7)
NEUTROPHILS RELATIVE PERCENT: 53.9 %
PDW BLD-RTO: 13.1 % (ref 12.4–15.4)
PLATELET # BLD: 137 K/UL (ref 135–450)
PMV BLD AUTO: 9.8 FL (ref 5–10.5)
POTASSIUM SERPL-SCNC: 4.7 MMOL/L (ref 3.5–5.1)
RBC # BLD: 4.7 M/UL (ref 4.2–5.9)
SODIUM BLD-SCNC: 135 MMOL/L (ref 136–145)
TOTAL CK: 57 U/L (ref 39–308)
TOTAL PROTEIN: 7.2 G/DL (ref 6.4–8.2)
TSH SERPL DL<=0.05 MIU/L-ACNC: 1.19 UIU/ML (ref 0.27–4.2)
WBC # BLD: 5.8 K/UL (ref 4–11)

## 2019-09-11 PROCEDURE — 99214 OFFICE O/P EST MOD 30 MIN: CPT | Performed by: INTERNAL MEDICINE

## 2019-09-11 PROCEDURE — 84443 ASSAY THYROID STIM HORMONE: CPT

## 2019-09-11 PROCEDURE — 71046 X-RAY EXAM CHEST 2 VIEWS: CPT

## 2019-09-11 PROCEDURE — 93000 ELECTROCARDIOGRAM COMPLETE: CPT | Performed by: INTERNAL MEDICINE

## 2019-09-11 PROCEDURE — 85025 COMPLETE CBC W/AUTO DIFF WBC: CPT

## 2019-09-11 PROCEDURE — 80053 COMPREHEN METABOLIC PANEL: CPT

## 2019-09-11 PROCEDURE — 36415 COLL VENOUS BLD VENIPUNCTURE: CPT

## 2019-09-11 PROCEDURE — 82550 ASSAY OF CK (CPK): CPT

## 2019-09-11 RX ORDER — ICOSAPENT ETHYL 1000 MG/1
CAPSULE ORAL
Refills: 2 | COMMUNITY
Start: 2019-08-13

## 2019-09-11 RX ORDER — ASPIRIN 81 MG/1
81 TABLET ORAL DAILY
Qty: 90 TABLET | Refills: 3 | COMMUNITY
Start: 2019-09-11

## 2019-09-11 RX ORDER — POTASSIUM CHLORIDE 750 MG/1
TABLET, FILM COATED, EXTENDED RELEASE ORAL
Refills: 0 | COMMUNITY
Start: 2019-06-26

## 2019-09-11 RX ORDER — EVOLOCUMAB 140 MG/ML
INJECTION, SOLUTION SUBCUTANEOUS
Refills: 11 | COMMUNITY
Start: 2019-07-31

## 2019-09-11 NOTE — LETTER
4. Dyslipidemia  ~ 3/15/19 lipids - TC- 145, TG- 415, HDL- 29, unable to calculate LDL due to elevated trigs. LDL direct drawn but do not have results. Hgb A1C 6.7.    ~ 3/2019 -- PCP prescribed generic Lovaza 2 gm twice a day and increased Atorvastatin to 80 mg.  ~ Tolerating medications without side effects  ~ 7/16/19 labs - TC- 166, TG- 182, HDL- 44, LDL- 86. Lipids improved with medication adjustment    Plan > stable, continue current medications, repeat labs per PCP    5. Lethargy and malaise  ~ etiology of symptoms is unclear .      ~ will check CXR to rule out pneumonia and labs to check for sign of infection, dehydration, anemia, abnormal thyroid function    Plan > check CXR, labs - CMP, CBC, TSH, CK      Plan:  1. Chest X-ray today  2. Labs today - CBC, CMP, TSH, CK .   3. Add enteric coated aspirin daily  4. NP in one month  5. Follow up with MD in late spring to summer 2020      Scribe's attestation: This note was scribed in the presence of Tariq Lang M.D. by Thong Lazo RN    Physician Attestation: The scribe's documentation has been prepared under my direction and personally reviewed by me in its entirety. I confirm that the note above accurately reflects all work, treatment, procedures, and medical decision making performed by me. An  electronic signature was used to authenticate this note. Nico Castillo MD, [facility]    If you have questions, please do not hesitate to call me. I look forward to following Khadijah Brown along with you.     Sincerely,        Tariq Lang MD

## 2019-09-26 DIAGNOSIS — G40.109 PARTIAL SYMPTOMATIC EPILEPSY WITH SIMPLE PARTIAL SEIZURES, NOT INTRACTABLE, WITHOUT STATUS EPILEPTICUS (HCC): ICD-10-CM

## 2019-09-26 RX ORDER — DIVALPROEX SODIUM 500 MG/1
TABLET, EXTENDED RELEASE ORAL
Qty: 270 TABLET | Refills: 0 | Status: SHIPPED | OUTPATIENT
Start: 2019-09-26 | End: 2019-11-22 | Stop reason: SDUPTHER

## 2019-09-26 RX ORDER — DIVALPROEX SODIUM 500 MG/1
TABLET, EXTENDED RELEASE ORAL
Qty: 270 TABLET | Refills: 1 | OUTPATIENT
Start: 2019-09-26

## 2019-09-30 ENCOUNTER — OFFICE VISIT (OUTPATIENT)
Dept: NEUROLOGY | Age: 69
End: 2019-09-30
Payer: COMMERCIAL

## 2019-09-30 VITALS
WEIGHT: 210 LBS | BODY MASS INDEX: 31.1 KG/M2 | SYSTOLIC BLOOD PRESSURE: 120 MMHG | HEIGHT: 69 IN | DIASTOLIC BLOOD PRESSURE: 73 MMHG | HEART RATE: 70 BPM

## 2019-09-30 DIAGNOSIS — G40.109 PARTIAL SYMPTOMATIC EPILEPSY WITH SIMPLE PARTIAL SEIZURES, NOT INTRACTABLE, WITHOUT STATUS EPILEPTICUS (HCC): Primary | ICD-10-CM

## 2019-09-30 DIAGNOSIS — R25.1 PILL ROLLING TREMOR: ICD-10-CM

## 2019-09-30 PROCEDURE — 99214 OFFICE O/P EST MOD 30 MIN: CPT | Performed by: PSYCHIATRY & NEUROLOGY

## 2019-10-10 ENCOUNTER — HOSPITAL ENCOUNTER (EMERGENCY)
Age: 69
Discharge: HOME | End: 2019-10-11
Attending: EMERGENCY MEDICINE
Payer: COMMERCIAL

## 2019-10-10 DIAGNOSIS — R11.2 NON-INTRACTABLE VOMITING WITH NAUSEA, UNSPECIFIED VOMITING TYPE: ICD-10-CM

## 2019-10-10 DIAGNOSIS — R73.9 HYPERGLYCEMIA: Primary | ICD-10-CM

## 2019-10-10 LAB
A/G RATIO: 1.2 (ref 1.1–2.2)
ALBUMIN SERPL-MCNC: 3.7 G/DL (ref 3.4–5)
ALP BLD-CCNC: 50 U/L (ref 40–129)
ALT SERPL-CCNC: 58 U/L (ref 10–40)
ANION GAP SERPL CALCULATED.3IONS-SCNC: 13 MMOL/L (ref 3–16)
AST SERPL-CCNC: 111 U/L (ref 15–37)
BASE EXCESS VENOUS: 2 MMOL/L (ref -3–3)
BASOPHILS ABSOLUTE: 0 K/UL (ref 0–0.2)
BASOPHILS RELATIVE PERCENT: 0.5 %
BETA-HYDROXYBUTYRATE: 2.2 MMOL/L (ref 0–0.27)
BILIRUB SERPL-MCNC: 0.6 MG/DL (ref 0–1)
BILIRUBIN URINE: ABNORMAL
BLOOD, URINE: ABNORMAL
BUN BLDV-MCNC: 22 MG/DL (ref 7–20)
CALCIUM SERPL-MCNC: 9 MG/DL (ref 8.3–10.6)
CARBOXYHEMOGLOBIN: 5 % (ref 0–1.5)
CHLORIDE BLD-SCNC: 91 MMOL/L (ref 99–110)
CLARITY: CLEAR
CO2: 26 MMOL/L (ref 21–32)
COLOR: YELLOW
CREAT SERPL-MCNC: 1.1 MG/DL (ref 0.8–1.3)
EOSINOPHILS ABSOLUTE: 0 K/UL (ref 0–0.6)
EOSINOPHILS RELATIVE PERCENT: 0 %
EPITHELIAL CELLS, UA: 0 /HPF (ref 0–5)
GFR AFRICAN AMERICAN: >60
GFR NON-AFRICAN AMERICAN: >60
GLOBULIN: 3.1 G/DL
GLUCOSE BLD-MCNC: 287 MG/DL (ref 70–99)
GLUCOSE BLD-MCNC: 312 MG/DL (ref 70–99)
GLUCOSE URINE: >=1000 MG/DL
HCO3 VENOUS: 26.3 MMOL/L (ref 23–29)
HCT VFR BLD CALC: 41.2 % (ref 40.5–52.5)
HEMOGLOBIN: 13.6 G/DL (ref 13.5–17.5)
HYALINE CASTS: 0 /LPF (ref 0–8)
KETONES, URINE: >=80 MG/DL
LEUKOCYTE ESTERASE, URINE: NEGATIVE
LIPASE: 12 U/L (ref 13–60)
LYMPHOCYTES ABSOLUTE: 0.7 K/UL (ref 1–5.1)
LYMPHOCYTES RELATIVE PERCENT: 12.3 %
MAGNESIUM: 1.5 MG/DL (ref 1.8–2.4)
MCH RBC QN AUTO: 29.9 PG (ref 26–34)
MCHC RBC AUTO-ENTMCNC: 33.1 G/DL (ref 31–36)
MCV RBC AUTO: 90.5 FL (ref 80–100)
METHEMOGLOBIN VENOUS: 0.2 %
MICROSCOPIC EXAMINATION: YES
MONOCYTES ABSOLUTE: 0.9 K/UL (ref 0–1.3)
MONOCYTES RELATIVE PERCENT: 15 %
NEUTROPHILS ABSOLUTE: 4.2 K/UL (ref 1.7–7.7)
NEUTROPHILS RELATIVE PERCENT: 72.2 %
NITRITE, URINE: NEGATIVE
O2 CONTENT, VEN: 18 VOL %
O2 SAT, VEN: 98 %
O2 THERAPY: ABNORMAL
PCO2, VEN: 39.2 MMHG (ref 40–50)
PDW BLD-RTO: 13.3 % (ref 12.4–15.4)
PERFORMED ON: ABNORMAL
PH UA: 6 (ref 5–8)
PH VENOUS: 7.43 (ref 7.35–7.45)
PLATELET # BLD: 90 K/UL (ref 135–450)
PLATELET SLIDE REVIEW: ABNORMAL
PMV BLD AUTO: 9.3 FL (ref 5–10.5)
PO2, VEN: 91.3 MMHG (ref 25–40)
POTASSIUM REFLEX MAGNESIUM: 3.3 MMOL/L (ref 3.5–5.1)
PROTEIN UA: 100 MG/DL
RBC # BLD: 4.56 M/UL (ref 4.2–5.9)
RBC UA: 2 /HPF (ref 0–4)
SLIDE REVIEW: ABNORMAL
SODIUM BLD-SCNC: 130 MMOL/L (ref 136–145)
SPECIFIC GRAVITY UA: 1.03 (ref 1–1.03)
TCO2 CALC VENOUS: 62 MMOL/L
TOTAL PROTEIN: 6.8 G/DL (ref 6.4–8.2)
URINE REFLEX TO CULTURE: ABNORMAL
URINE TYPE: ABNORMAL
UROBILINOGEN, URINE: 1 E.U./DL
WBC # BLD: 5.8 K/UL (ref 4–11)
WBC UA: 1 /HPF (ref 0–5)

## 2019-10-10 PROCEDURE — 82803 BLOOD GASES ANY COMBINATION: CPT

## 2019-10-10 PROCEDURE — 80053 COMPREHEN METABOLIC PANEL: CPT

## 2019-10-10 PROCEDURE — 82010 KETONE BODYS QUAN: CPT

## 2019-10-10 PROCEDURE — 99284 EMERGENCY DEPT VISIT MOD MDM: CPT

## 2019-10-10 PROCEDURE — 96361 HYDRATE IV INFUSION ADD-ON: CPT

## 2019-10-10 PROCEDURE — 83690 ASSAY OF LIPASE: CPT

## 2019-10-10 PROCEDURE — 85025 COMPLETE CBC W/AUTO DIFF WBC: CPT

## 2019-10-10 PROCEDURE — 96360 HYDRATION IV INFUSION INIT: CPT

## 2019-10-10 PROCEDURE — 81001 URINALYSIS AUTO W/SCOPE: CPT

## 2019-10-10 PROCEDURE — 83735 ASSAY OF MAGNESIUM: CPT

## 2019-10-10 PROCEDURE — 2580000003 HC RX 258: Performed by: PHYSICIAN ASSISTANT

## 2019-10-10 RX ORDER — 0.9 % SODIUM CHLORIDE 0.9 %
1000 INTRAVENOUS SOLUTION INTRAVENOUS ONCE
Status: COMPLETED | OUTPATIENT
Start: 2019-10-10 | End: 2019-10-10

## 2019-10-10 RX ORDER — 0.9 % SODIUM CHLORIDE 0.9 %
1000 INTRAVENOUS SOLUTION INTRAVENOUS ONCE
Status: COMPLETED | OUTPATIENT
Start: 2019-10-10 | End: 2019-10-11

## 2019-10-10 RX ADMIN — SODIUM CHLORIDE 1000 ML: 9 INJECTION, SOLUTION INTRAVENOUS at 21:36

## 2019-10-10 RX ADMIN — SODIUM CHLORIDE 1000 ML: 9 INJECTION, SOLUTION INTRAVENOUS at 20:08

## 2019-10-10 RX ADMIN — SODIUM CHLORIDE 1000 ML: 9 INJECTION, SOLUTION INTRAVENOUS at 23:31

## 2019-10-10 ASSESSMENT — ENCOUNTER SYMPTOMS
SHORTNESS OF BREATH: 0
NAUSEA: 0
VOMITING: 0
DIARRHEA: 0
WHEEZING: 0
ABDOMINAL PAIN: 0

## 2019-10-11 VITALS
TEMPERATURE: 97.8 F | HEART RATE: 81 BPM | SYSTOLIC BLOOD PRESSURE: 144 MMHG | OXYGEN SATURATION: 91 % | HEIGHT: 69 IN | BODY MASS INDEX: 31.1 KG/M2 | RESPIRATION RATE: 16 BRPM | DIASTOLIC BLOOD PRESSURE: 63 MMHG | WEIGHT: 210 LBS

## 2019-10-11 LAB
GLUCOSE BLD-MCNC: 301 MG/DL (ref 70–99)
PERFORMED ON: ABNORMAL

## 2019-10-11 PROCEDURE — 96361 HYDRATE IV INFUSION ADD-ON: CPT

## 2019-11-22 DIAGNOSIS — G40.109 PARTIAL SYMPTOMATIC EPILEPSY WITH SIMPLE PARTIAL SEIZURES, NOT INTRACTABLE, WITHOUT STATUS EPILEPTICUS (HCC): ICD-10-CM

## 2019-11-22 RX ORDER — DIVALPROEX SODIUM 500 MG/1
TABLET, EXTENDED RELEASE ORAL
Qty: 90 TABLET | Refills: 2 | Status: SHIPPED | OUTPATIENT
Start: 2019-11-22 | End: 2020-01-29

## 2019-11-26 ENCOUNTER — TELEPHONE (OUTPATIENT)
Dept: CARDIOLOGY CLINIC | Age: 69
End: 2019-11-26

## 2019-12-02 ENCOUNTER — TELEPHONE (OUTPATIENT)
Dept: CARDIOLOGY CLINIC | Age: 69
End: 2019-12-02

## 2020-02-14 RX ORDER — CLOPIDOGREL BISULFATE 75 MG/1
TABLET ORAL
Qty: 90 TABLET | Refills: 1 | Status: SHIPPED | OUTPATIENT
Start: 2020-02-14 | End: 2020-06-25 | Stop reason: ALTCHOICE

## 2020-03-25 PROBLEM — M65.312 TRIGGER FINGER OF LEFT THUMB: Status: RESOLVED | Noted: 2017-01-25 | Resolved: 2020-03-24

## 2020-03-25 PROBLEM — F32.A DEPRESSION: Status: RESOLVED | Noted: 2020-03-25 | Resolved: 2020-03-24

## 2020-04-27 ENCOUNTER — VIRTUAL VISIT (OUTPATIENT)
Dept: PULMONOLOGY | Age: 70
End: 2020-04-27
Payer: COMMERCIAL

## 2020-04-27 PROCEDURE — 99442 PR PHYS/QHP TELEPHONE EVALUATION 11-20 MIN: CPT | Performed by: NURSE PRACTITIONER

## 2020-04-27 ASSESSMENT — SLEEP AND FATIGUE QUESTIONNAIRES
HOW LIKELY ARE YOU TO NOD OFF OR FALL ASLEEP IN A CAR, WHILE STOPPED FOR A FEW MINUTES IN TRAFFIC: 0
HOW LIKELY ARE YOU TO NOD OFF OR FALL ASLEEP WHILE SITTING INACTIVE IN A PUBLIC PLACE: 0
HOW LIKELY ARE YOU TO NOD OFF OR FALL ASLEEP WHILE WATCHING TV: 1
HOW LIKELY ARE YOU TO NOD OFF OR FALL ASLEEP WHILE SITTING AND TALKING TO SOMEONE: 0
HOW LIKELY ARE YOU TO NOD OFF OR FALL ASLEEP WHEN YOU ARE A PASSENGER IN A CAR FOR AN HOUR WITHOUT A BREAK: 0
ESS TOTAL SCORE: 4
HOW LIKELY ARE YOU TO NOD OFF OR FALL ASLEEP WHILE LYING DOWN TO REST IN THE AFTERNOON WHEN CIRCUMSTANCES PERMIT: 2
HOW LIKELY ARE YOU TO NOD OFF OR FALL ASLEEP WHILE SITTING AND READING: 1
HOW LIKELY ARE YOU TO NOD OFF OR FALL ASLEEP WHILE SITTING QUIETLY AFTER LUNCH WITHOUT ALCOHOL: 0

## 2020-04-27 NOTE — ASSESSMENT & PLAN NOTE
Not able to Review compliance download with pt. Supplies and parts as needed for his machine. These are medically necessary. Continue medications per his PCP and other physicians. Limit caffeine use after 3pm.  Encouraged him to work on weight loss through diet and exercise. Diagnoses of Uncontrolled type 2 diabetes mellitus with complication, without long-term current use of insulin ([facility] [address] Coronary artery disease due to lipid rich plaque, and Essential hypertension were pertinent to this visit. The chronic medical conditions listed are directly related to the primary diagnosis listed above. The management of the primary diagnosis affects the secondary diagnosis and vice versa.

## 2020-04-28 RX ORDER — ATORVASTATIN CALCIUM 80 MG/1
80 TABLET, FILM COATED ORAL DAILY
Qty: 30 TABLET | Refills: 1 | Status: SHIPPED | OUTPATIENT
Start: 2020-04-28 | End: 2020-05-20

## 2020-04-28 NOTE — TELEPHONE ENCOUNTER
E prescribed Lipitor 80 mg #30 with 1 refill. Pt needs labs for refills. Placed lab orders in the pt's chart. Last OV with [facility] 9/11/19  4. Dyslipidemia  ~ 3/15/19 lipids - TC- 145, TG- 415, HDL- 29, unable to calculate LDL due to elevated trigs. LDL direct drawn but do not have results. Hgb A1C 6.7.    ~ 3/2019 -- PCP prescribed generic Lovaza 2 gm twice a day and increased Atorvastatin to 80 mg.  ~ Tolerating medications without side effects  ~ 7/16/19 labs - TC- 166, TG- 182, HDL- 44, LDL- 86.   Lipids improved with medication adjustment

## 2020-04-28 NOTE — TELEPHONE ENCOUNTER
Medication Refill    Medication needing refilled: atorvastatin (LIPITOR) 80 MG tablet  - PT IS OUT OF MEDICATION    Doseage of the medication: 80 mg    How are you taking this medication (QD, BID, TID, QID, PRN): 1 tab daily    30 or 90 day supply called in: 80    Which Pharmacy are we sending the medication to?::  [facility], [address] [place] [address]

## 2020-05-20 RX ORDER — ATORVASTATIN CALCIUM 80 MG/1
TABLET, FILM COATED ORAL
Qty: 30 TABLET | Refills: 1 | Status: SHIPPED | OUTPATIENT
Start: 2020-05-20

## 2020-06-06 ENCOUNTER — VIRTUAL VISIT (OUTPATIENT)
Dept: NEUROLOGY | Age: 70
End: 2020-06-06
Payer: COMMERCIAL

## 2020-06-06 PROCEDURE — 99442 PR PHYS/QHP TELEPHONE EVALUATION 11-20 MIN: CPT | Performed by: PSYCHIATRY & NEUROLOGY

## 2020-06-06 RX ORDER — DIVALPROEX SODIUM 500 MG/1
TABLET, EXTENDED RELEASE ORAL
Qty: 270 TABLET | Refills: 1 | Status: SHIPPED | OUTPATIENT
Start: 2020-06-06 | End: 2020-06-25 | Stop reason: DRUGHIGH

## 2020-06-06 NOTE — PROGRESS NOTES
TELEHEALTH EVALUATION -- Audio/telephone evaluation (During MPEQG-88 public health emergency)    Due to COVID 19 outbreak, patient's office visit was converted to a virtual visit. Patient was contacted and agreed to proceed with a virtual visit via   Telephone Visit   The risks and benefits of converting to a virtual visit were discussed in light of the current infectious disease epidemic. Patient also understood that insurance coverage and co-pays are up to their individual insurance plans. [facility]   Neurology followup    Subjective:   CC/HP  History was obtained from the patient. Patient is partial complex seizures. He is doing well without any further seizures. No side effects to medication. He has been taking the medications regularly. Patient states that the right hand tremors have improved and he does not notice it much anymore. He denies any other neurological symptoms.   No side effects to medication    REVIEW OF SYSTEMS    Constitutional:  [x]   Chills   []  Fatigue   []  Fevers   []  Malaise   [x]  Weight loss     [] Denies all of the above    Respiratory:   [x]  Cough    []  Shortness of breath         [] Denies all of the above     Cardiovascular:   []  Chest pain    []  Exertional chest pressure/discomfort           [] Palpitations    []  Syncope     [x] Denies all of the above        Past Medical History:   Diagnosis Date    Anxiety     BPH (benign prostatic hyperplasia)     CAD (coronary artery disease) 10/25/2013    s/p angioplsty 2    Chronic back pain oct 2008    in [place] [address]    DDD (degenerative disc disease), lumbosacral 1/7/2013    Depression     Eosinophilic granuloma ([facility] [address]     sp biopsy 10 years by Dr Caden Perez Epilepsy [facility])     Hyperlipidemia     Hypertension     Irritable bowel syndrome     Myofascial pain syndrome 1/7/2013    Obstructive sleep apnea (adult) (pediatric) 10/9/2014    Type II or unspecified type diabetes mellitus without mention of complication, not stated as uncontrolled      Family History   Problem Relation Age of Onset    High Blood Pressure Mother     Diabetes Father     Heart Disease Father     High Blood Pressure Father      Social History     Socioeconomic History    Marital status: Single     Spouse name: Not on file    Number of children: Not on file    Years of education: Not on file    Highest education level: Not on file   Occupational History    Not on file   Social Needs    Financial resource strain: Not on file    Food insecurity     Worry: Not on file     Inability: Not on file    Transportation needs     Medical: Not on file     Non-medical: Not on file   Tobacco Use    Smoking status: Current Some Day Smoker     Packs/day: 0.50     Years: 10.00     Pack years: 5.00     Types: Cigarettes     Last attempt to quit: 5/26/2017     Years since quitting: 3.0    Smokeless tobacco: Never Used   Substance and Sexual Activity    Alcohol use: No    Drug use: No    Sexual activity: Yes     Partners: Female   Lifestyle    Physical activity     Days per week: Not on file     Minutes per session: Not on file    Stress: Not on file   Relationships    Social connections     Talks on phone: Not on file     Gets together: Not on file     Attends Amish service: Not on file     Active member of club or organization: Not on file     Attends meetings of clubs or organizations: Not on file     Relationship status: Not on file    Intimate partner violence     Fear of current or ex partner: Not on file     Emotionally abused: Not on file     Physically abused: Not on file     Forced sexual activity: Not on file   Other Topics Concern    Not on file   Social History Narrative    Not on file        Objective:  Exam:  There were no vitals taken for this visit.   Physical examination was not performed since this was a telephone visit      Data :  LABS:  General Labs:    CBC:   Lab Results   Component Value Date    WBC 5.8 10/10/2019    RBC 4.56 10/10/2019    HGB 13.6 10/10/2019    HCT 41.2 10/10/2019    MCV 90.5 10/10/2019    MCH 29.9 10/10/2019    MCHC 33.1 10/10/2019    RDW 13.3 10/10/2019    PLT 90 10/10/2019    MPV 9.3 10/10/2019     BMP:    Lab Results   Component Value Date     10/10/2019    K 3.3 10/10/2019    CL 91 10/10/2019    CO2 26 10/10/2019    BUN 22 10/10/2019    LABALBU 3.7 10/10/2019    CREATININE 1.1 10/10/2019    CALCIUM 9.0 10/10/2019    GFRAA >60 10/10/2019    GFRAA >60 04/12/2013    LABGLOM >60 10/10/2019    GLUCOSE 312 10/10/2019     RADIOLOGY REVIEW:  I have reviewed radiology report(s) of: MRI brain    Impression :  Partial simple seizures  Patient has longstanding thrombocytopenia  MRI brain normal  Patient reports that the right hand rest tremor has improved    Plan :  Refill Depakote  mg 3 tablets daily  Return in 6 months  Time spent with patient during this telephone visit was 13 minutes        Please note a portion of  this chart was generated using dragon dictation software. Although every effort was made to ensure the accuracy of this automated transcription, some errors in transcription may have occurred. Pursuant to the emergency declaration under the [facility] waiver authority and the [employer] and [employer] Act, this Virtual  Visit was conducted, with patient's consent, to reduce the patient's risk of exposure to COVID-19 and provide continuity of care for an established patient.

## 2020-06-24 ENCOUNTER — OFFICE VISIT (OUTPATIENT)
Dept: CARDIOLOGY CLINIC | Age: 70
End: 2020-06-24
Payer: MEDICARE

## 2020-06-24 VITALS
BODY MASS INDEX: 30.66 KG/M2 | HEART RATE: 77 BPM | WEIGHT: 207 LBS | DIASTOLIC BLOOD PRESSURE: 72 MMHG | HEIGHT: 69 IN | SYSTOLIC BLOOD PRESSURE: 132 MMHG | OXYGEN SATURATION: 96 %

## 2020-06-24 PROCEDURE — 99214 OFFICE O/P EST MOD 30 MIN: CPT | Performed by: INTERNAL MEDICINE

## 2020-06-24 NOTE — PROGRESS NOTES
[address]    2020    Jamal Carrier (:  1950) is a 77 y.o. male is here for follow-up and management of CAD and modifiable risk factors. Referring Provider: Harinder Talbot MD    HISTORY:  Mr. Barb Horton has a history of coronary artery disease with CODEY to Diag-1 and RCA in . Additional history includes HTN, Hyperlipidemia, DM. Sleep apnea is treated with CPAP. He follows with Dr. Poly Ayoub for partial simples seizures. Today, he denies chest discomfort. He does note some intermittent shortness of breath but is improving. He is started walking around the block and feels that things are improving. He has noted some postural lightheadedness with standing too quickly. The postural lightheadedness is intermittent. REVIEW OF SYSTEMS:  A complete review of systems was reviewed and is negative except as noted in the history of present illness. Prior to Visit Medications    Medication Sig Taking?  Authorizing Provider   diclofenac sodium (VOLTAREN) 1 % GEL Apply 2 g topically 4 times daily Yes Historical Provider, MD   divalproex (DEPAKOTE ER) 500 MG extended release tablet TAKE 3 TABLETS BY MOUTH EVERY DAY Yes Lola Sneed MD   atorvastatin (LIPITOR) 80 MG tablet TAKE 1 TABLET BY MOUTH EVERY DAY Yes David Webb MD   clopidogrel (PLAVIX) 75 MG tablet TAKE 1 TABLET BY MOUTH EVERY DAY Yes David Webb MD   METFORMIN HCL PO Take by mouth Yes Historical Provider, MD Crystal Barnes 573 MG/ML SOAJ INJECT 1 PEN SC EVERY 2 WEEKS Yes Historical Provider, MD   VASCEPA 1 g CAPS capsule TAKE 2 CAPSULES BY MOUTH TWICE A DAY WITH FOOD Yes Historical Provider, MD   potassium chloride (KLOR-CON) 10 MEQ extended release tablet TAKE 1 TABLET BY MOUTH EVERY DAY Yes Historical Provider, MD   aspirin EC 81 MG EC tablet Take 1 tablet by mouth daily Yes David Webb MD   omega-3 acid ethyl esters (LOVAZA) 1 g capsule Take 2 g by mouth 2 times daily Yes Historical Provider, MD years: 5.00     Types: Cigarettes     Last attempt to quit: 5/26/2017     Years since quitting: 3.0    Smokeless tobacco: Never Used   Substance Use Topics    Alcohol use: No        Family History   Problem Relation Age of Onset    High Blood Pressure Mother     Diabetes Father     Heart Disease Father     High Blood Pressure Father        PHYSICAL EXAMINATION:  Vitals:    06/24/20 1058   BP: 132/72   Site: Left Upper Arm   Position: Sitting   Cuff Size: Medium Adult   Pulse: 77   SpO2: 96%   Weight: 207 lb (93.9 kg)   Height: 5' 9\" (1.753 m)     Estimated body mass index is 30.57 kg/m² as calculated from the following:    Height as of this encounter: 5' 9\" (1.753 m). Weight as of this encounter: 207 lb (93.9 kg). Physical Exam  Constitutional:       Appearance: He is well-developed. He is not diaphoretic. HENT:      Head: Normocephalic and atraumatic. Eyes:      General: No scleral icterus. Extraocular Movements: Extraocular movements intact. Conjunctiva/sclera: Conjunctivae normal.   Neck:      Musculoskeletal: Normal range of motion and neck supple. Vascular: No JVD. Cardiovascular:      Rate and Rhythm: Normal rate and regular rhythm. Heart sounds: Normal heart sounds. No murmur. No friction rub. No gallop. Pulmonary:      Effort: Pulmonary effort is normal. No respiratory distress. Breath sounds: Normal breath sounds. No wheezing or rales. Abdominal:      General: Bowel sounds are normal.      Palpations: Abdomen is soft. Tenderness: There is no abdominal tenderness. Musculoskeletal: Normal range of motion. Skin:     General: Skin is warm and dry. Findings: No rash. Neurological:      General: No focal deficit present. Mental Status: He is alert and oriented to person, place, and time. Psychiatric:         Mood and Affect: Mood normal.         Behavior: Behavior normal.         Thought Content:  Thought content normal.         Judgment: Judgment normal.           I have reviewed all pertinent lab results and diagnostic testing. Lab Results   Component Value Date    CHOL 126 06/07/2017    TRIG 129 06/07/2017    HDL 28 06/07/2017    HDL 31 04/05/2012    LDLCALC 72 06/07/2017     Lab Results   Component Value Date     10/10/2019    K 3.3 10/10/2019    CL 91 10/10/2019    CO2 26 10/10/2019    GLUCOSE 312 10/10/2019    BUN 22 10/10/2019    CREATININE 1.1 10/10/2019    CALCIUM 9.0 10/10/2019    GFR >60 04/12/2013    GFRAA >60 10/10/2019    GFRAA >60 04/12/2013     Lab Results   Component Value Date    ALT 58 10/10/2019     10/10/2019     ECG 9/11/19: SB, incomplete RBBB, 58 bpm     ECG 3/27/19: SR, incomplete RBBB, 65 bpm    MCOT 6/14/17 - 7/13/17: SR with avg HR 61 (). No evidence of Atrial fib    Echo 6/2/17:  Summary   Normal left ventricle size and systolic function with an estimated ejection   fraction of 60%. No regional wall motion abnormalities are seen.  Zeinab Comment is mild concentric left ventricular hypertrophy.   E/e\"= 11.   Mild mitral regurgitation.   A bubble study was performed and fails to show evidence of shunting.   Trivial aortic regurgitation.   Trivial tricuspid regurgitation with RVSP estimated at 33 mmHg.   There is a trivial pericardial effusion noted.     MRI/MRA 5/26/17:  1. No acute intracranial abnormality. 2. Mild chronic white matter microvascular ischemic changes. 3. Remote lacunar infarct in the posterior right frontal periventricular   white matter. 4. Normal MRA of the head. 5. Normal MRA of the neck. 6. Findings of left maxillary sinusitis, which may be acute or chronic.      Myoview stress test 9/4/15:  Conclusions       Summary   Normal myocardial perfusion study. LVEF 66%      Echo 9/4/15:  Summary  Normal left ventricle size, wall thickness and systolic function with an estimated ejection fraction of 60%. No regional wall motion abnormalities are seen.   E/e\"= 15.  Mild mitral LFTs, CK.  2.  Increase activity. 3.  RTC 6 months to NP. MD in 1 year. Physician Attestation: The scribe's documentation has been prepared under my direction and personally reviewed by me in its entirety. I confirm that the note above accurately reflects all work, treatment, procedures, and medical decision making performed by me. An  electronic signature was used to authenticate this note. Yulia Negro MD, [facility], [address]

## 2020-06-24 NOTE — LETTER
[address] [place] [address] Cardiology [place] [address] [address] [id number]  Phone: [phone]  Fax: [phone]    Rose Mary Raygoza MD        2020     Beau Rucker, [address] [address] [place] [id number]    Patient: Titi Rodrigues  MR Number: [id number]  YOB: 1950  Date of Visit: 2020    Dear Dr. Beau Rucker:    Below are the relevant portions of my assessment and plan of care. [address]    2020    Titi Rodrigues (:  1950) is a 77 y.o. male is here for follow-up and management of CAD and modifiable risk factors. Referring Provider: Beau Rucker MD    HISTORY:  Mr. Raya Saenz has a history of coronary artery disease with CODEY to Diag-1 and RCA in . Additional history includes HTN, Hyperlipidemia, DM. Sleep apnea is treated with CPAP. He follows with Dr. Luyl Cárdenas for partial simples seizures. Today, he denies chest discomfort. He does note some intermittent shortness of breath but is improving. He is started walking around the block and feels that things are improving. He has noted some postural lightheadedness with standing too quickly. The postural lightheadedness is intermittent. REVIEW OF SYSTEMS:  A complete review of systems was reviewed and is negative except as noted in the history of present illness. Prior to Visit Medications    Medication Sig Taking?  Authorizing Provider   diclofenac sodium (VOLTAREN) 1 % GEL Apply 2 g topically 4 times daily Yes Historical Provider, MD   divalproex (DEPAKOTE ER) 500 MG extended release tablet TAKE 3 TABLETS BY MOUTH EVERY DAY Yes Luz Marina Bueno MD   atorvastatin (LIPITOR) 80 MG tablet TAKE 1 TABLET BY MOUTH EVERY DAY Yes Rose Mary Raygoza MD   clopidogrel (PLAVIX) 75 MG tablet TAKE 1 TABLET BY MOUTH EVERY DAY Yes Rose Mary Raygoza MD   METFORMIN HCL PO Take by mouth Yes Historical Provider, MD AL TRIPP 353 MG/ML SOAJ INJECT 1 PEN SC EVERY 2 WEEKS Yes Historical Provider, MD   VASCEPA 1 g CAPS capsule TAKE 2 CAPSULES BY MOUTH TWICE A DAY WITH FOOD Yes Historical Provider, MD   potassium chloride (KLOR-CON) 10 MEQ extended release tablet TAKE 1 TABLET BY MOUTH EVERY DAY Yes Historical Provider, MD   aspirin EC 81 MG EC tablet Take 1 tablet by mouth daily Yes Keerthi Chase MD   omega-3 acid ethyl esters (LOVAZA) 1 g capsule Take 2 g by mouth 2 times daily Yes Historical Provider, MD   hydrochlorothiazide (HYDRODIURIL) 25 MG tablet TAKE 1 TABLET BY MOUTH EVERY DAY Yes Historical Provider, MD   metoprolol tartrate (LOPRESSOR) 25 MG tablet TAKE 1 TABLET BY MOUTH 2 TIMES DAILY Yes Keerthi Chase MD   omeprazole (PRILOSEC) 20 MG delayed release capsule Take 20 mg by mouth daily Yes Historical Provider, MD   lisinopril (PRINIVIL;ZESTRIL) 20 MG tablet TAKE 1 TABLET BY MOUTH DAILY Yes Keerthi Cahse MD   Blood Pressure Monitoring (BLOOD PRESSURE CUFF) MISC 1 Units by Does not apply route daily Yes Keerthi Chase MD   FLUoxetine (PROZAC) 20 MG capsule Take 1 capsule by mouth daily Yes Rashid Vidal MD   tamsulosin (FLOMAX) 0.4 MG capsule TAKE ONE CAPSULE BY MOUTH EVERY DAY IN THE EVENING Yes Rashid Vidal MD   diphenoxylate-atropine (LOMOTIL) 2.5-0.025 MG per tablet 1 tid Yes Rashid Vidal MD        Allergies   Allergen Reactions    Penicillins Hives    Benadryl [Diphenhydramine Hcl] Rash    Erythromycin Rash    Iodine Rash       Past Medical History:   Diagnosis Date    Anxiety     BPH (benign prostatic hyperplasia)     CAD (coronary artery disease) 10/25/2013    s/p angioplsty 2    Chronic back pain oct 2008    in [place] [id number]    DDD (degenerative disc disease), lumbosacral 1/7/2013    Depression     Eosinophilic granuloma ([facility] [address]     sp biopsy 10 years by Dr Kvng Beebe [facility])     Hyperlipidemia     Hypertension     Irritable bowel syndrome     Myofascial pain syndrome 1/7/2013    Obstructive sleep apnea (adult) (pediatric) 10/9/2014  Type II or unspecified type diabetes mellitus without mention of complication, not stated as uncontrolled        Past Surgical History:   Procedure Laterality Date    CORONARY ANGIOPLASTY WITH STENT PLACEMENT  10/2013    ELBOW BURSA SURGERY Left 7/24/2014    EXCISION OF OLECRANON BURSA, LEFT ELBOW    LUNG BIOPSY         Social History     Tobacco Use    Smoking status: Current Some Day Smoker     Packs/day: 0.50     Years: 10.00     Pack years: 5.00     Types: Cigarettes     Last attempt to quit: 5/26/2017     Years since quitting: 3.0    Smokeless tobacco: Never Used   Substance Use Topics    Alcohol use: No        Family History   Problem Relation Age of Onset    High Blood Pressure Mother     Diabetes Father     Heart Disease Father     High Blood Pressure Father        PHYSICAL EXAMINATION:  Vitals:    06/24/20 1058   BP: 132/72   Site: Left Upper Arm   Position: Sitting   Cuff Size: Medium Adult   Pulse: 77   SpO2: 96%   Weight: 207 lb (93.9 kg)   Height: 5' 9\" (1.753 m)     Estimated body mass index is 30.57 kg/m² as calculated from the following:    Height as of this encounter: 5' 9\" (1.753 m). Weight as of this encounter: 207 lb (93.9 kg). Physical Exam  Constitutional:       Appearance: He is well-developed. He is not diaphoretic. HENT:      Head: Normocephalic and atraumatic. Eyes:      General: No scleral icterus. Extraocular Movements: Extraocular movements intact. Conjunctiva/sclera: Conjunctivae normal.   Neck:      Musculoskeletal: Normal range of motion and neck supple. Vascular: No JVD. Cardiovascular:      Rate and Rhythm: Normal rate and regular rhythm. Heart sounds: Normal heart sounds. No murmur. No friction rub. No gallop. Pulmonary:      Effort: Pulmonary effort is normal. No respiratory distress. Breath sounds: Normal breath sounds. No wheezing or rales. Abdominal:      General: Bowel sounds are normal.      Palpations: Abdomen is soft. Tenderness: There is no abdominal tenderness. Musculoskeletal: Normal range of motion. Skin:     General: Skin is warm and dry. Findings: No rash. Neurological:      General: No focal deficit present. Mental Status: He is alert and oriented to person, place, and time. Psychiatric:         Mood and Affect: Mood normal.         Behavior: Behavior normal.         Thought Content: Thought content normal.         Judgment: Judgment normal.           I have reviewed all pertinent lab results and diagnostic testing. Lab Results   Component Value Date    CHOL 126 06/07/2017    TRIG 129 06/07/2017    HDL 28 06/07/2017    HDL 31 04/05/2012    LDLCALC 72 06/07/2017     Lab Results   Component Value Date     10/10/2019    K 3.3 10/10/2019    CL 91 10/10/2019    CO2 26 10/10/2019    GLUCOSE 312 10/10/2019    BUN 22 10/10/2019    CREATININE 1.1 10/10/2019    CALCIUM 9.0 10/10/2019    GFR >60 04/12/2013    GFRAA >60 10/10/2019    GFRAA >60 04/12/2013     Lab Results   Component Value Date    ALT 58 10/10/2019     10/10/2019     ECG 9/11/19: SB, incomplete RBBB, 58 bpm     ECG 3/27/19: SR, incomplete RBBB, 65 bpm    MCOT 6/14/17 - 7/13/17: SR with avg HR 61 (). No evidence of Atrial fib    Echo 6/2/17:  Summary   Normal left ventricle size and systolic function with an estimated ejection   fraction of 60%. No regional wall motion abnormalities are seen.  Hebrerth Sos is mild concentric left ventricular hypertrophy.   E/e\"= 11.   Mild mitral regurgitation.   A bubble study was performed and fails to show evidence of shunting.   Trivial aortic regurgitation.   Trivial tricuspid regurgitation with RVSP estimated at 33 mmHg.   There is a trivial pericardial effusion noted.     MRI/MRA 5/26/17:  1. No acute intracranial abnormality. 2. Mild chronic white matter microvascular ischemic changes. 3. Remote lacunar infarct in the posterior right frontal periventricular   white matter. medication adjustment    Plan > need to verify lipid lowering medical regimen. Repeat fasting lipid profile, LFTs, CK.    5.  Lethargy and malaise  ~ reported lethargy, malaise at 9/2019 visit. Blood work was unremarkable other than elevated glucose. TSH was normal. CXR showed no acute abnormality. Plan > no new complaints. 6.  Lightheadedness  - Postural.      Plan > ensure adequate hydration. Plan:  1. Check fasting lipid profile, LFTs, CK.  2.  Increase activity. 3.  RTC 6 months to NP. MD in 1 year. Physician Attestation: The scribe's documentation has been prepared under my direction and personally reviewed by me in its entirety. I confirm that the note above accurately reflects all work, treatment, procedures, and medical decision making performed by me. An  electronic signature was used to authenticate this note. Kemi Chris MD, [facility]    If you have questions, please do not hesitate to call me. I look forward to following Jose Gomez along with you.     Sincerely,        Daina Gasca MD

## 2020-06-24 NOTE — PATIENT INSTRUCTIONS
1.  No change in medications but will call you to go over all of your medications (either tonight or tomorrow  2. Check fasting blood work soon - lipids, liver enzymes, CK, BMP  3. No smoking - continue to work on this   4. Continue to walk for exercise  5. Call office with any concerning symptoms  6.   Follow up in six months with Vidhi Puente and Dr. Marian Magallon in one year

## 2020-06-25 ENCOUNTER — TELEPHONE (OUTPATIENT)
Dept: CARDIOLOGY CLINIC | Age: 70
End: 2020-06-25

## 2020-06-25 RX ORDER — DIVALPROEX SODIUM 500 MG/1
1000 TABLET, EXTENDED RELEASE ORAL DAILY
COMMUNITY
End: 2020-12-07 | Stop reason: SDUPTHER

## 2020-06-25 RX ORDER — ACETAMINOPHEN 160 MG
1 TABLET,DISINTEGRATING ORAL
COMMUNITY
End: 2020-12-16

## 2020-06-25 NOTE — TELEPHONE ENCOUNTER
Patient had an appointment with Dr. Evangelina Rangel, but he did not bring an updated list with him to the visit, nor did he know is medications by memory. Dr. Evangelina Rangel has concerns that our medication list in Epic is not accurate. [facility] for patient to call the office back and ask for me to discuss meds. Will review all medications he is taking and update our med list accordingly.

## 2020-06-25 NOTE — TELEPHONE ENCOUNTER
Reviewed all medications with patient and his med list was updated. Patient is no longer taking Lovaza and is now taking Vascepa. Patient was instructed to stop Plavix in November 2019 due to potential interaction with Omeprazole and Prozac. We received a refill request afterwards which was approved and patient resumed medication. Told patient that he could stop Plavix now or wait until his current supply is finished. Left voice message with pharmacy that Plavix can be discontinued from patient's current med list.    Repatha 140 mg syringe every 14 days is being prescribed by his PCP.

## 2020-06-26 ENCOUNTER — HOSPITAL ENCOUNTER (OUTPATIENT)
Age: 70
Discharge: HOME | End: 2020-06-26
Payer: MEDICARE

## 2020-06-26 LAB
ALT SERPL-CCNC: 9 U/L (ref 10–40)
ANION GAP SERPL CALCULATED.3IONS-SCNC: 12 MMOL/L (ref 3–16)
AST SERPL-CCNC: 12 U/L (ref 15–37)
BUN BLDV-MCNC: 21 MG/DL (ref 7–20)
CALCIUM SERPL-MCNC: 9 MG/DL (ref 8.3–10.6)
CHLORIDE BLD-SCNC: 102 MMOL/L (ref 99–110)
CHOLESTEROL, TOTAL: 118 MG/DL (ref 0–199)
CO2: 27 MMOL/L (ref 21–32)
CREAT SERPL-MCNC: 0.9 MG/DL (ref 0.8–1.3)
GFR AFRICAN AMERICAN: >60
GFR NON-AFRICAN AMERICAN: >60
GLUCOSE BLD-MCNC: 136 MG/DL (ref 70–99)
HDLC SERPL-MCNC: 33 MG/DL (ref 40–60)
LDL CHOLESTEROL CALCULATED: 59 MG/DL
POTASSIUM SERPL-SCNC: 4.3 MMOL/L (ref 3.5–5.1)
SODIUM BLD-SCNC: 141 MMOL/L (ref 136–145)
TOTAL CK: 69 U/L (ref 39–308)
TRIGL SERPL-MCNC: 130 MG/DL (ref 0–150)
VLDLC SERPL CALC-MCNC: 26 MG/DL

## 2020-06-26 PROCEDURE — 84460 ALANINE AMINO (ALT) (SGPT): CPT

## 2020-06-26 PROCEDURE — 80061 LIPID PANEL: CPT

## 2020-06-26 PROCEDURE — 84450 TRANSFERASE (AST) (SGOT): CPT

## 2020-06-26 PROCEDURE — 36415 COLL VENOUS BLD VENIPUNCTURE: CPT

## 2020-06-26 PROCEDURE — 82550 ASSAY OF CK (CPK): CPT

## 2020-06-26 PROCEDURE — 80048 BASIC METABOLIC PNL TOTAL CA: CPT

## 2020-06-30 ENCOUNTER — TELEPHONE (OUTPATIENT)
Dept: CARDIOLOGY CLINIC | Age: 70
End: 2020-06-30

## 2020-06-30 NOTE — TELEPHONE ENCOUNTER
----- Message from Kimberly Murphy MD sent at 6/30/2020  9:34 AM EDT -----  Your glucose is mildly elevated focus on carbohydrate reduction of sweets, breads and pastas. Your labs otherwise look really good.

## 2020-07-01 ENCOUNTER — HOSPITAL ENCOUNTER (OUTPATIENT)
Age: 70
Discharge: HOME | End: 2020-07-01
Payer: MEDICARE

## 2020-07-01 LAB
LUTEINIZING HORMONE: 2.7 MIU/ML
PROSTATE SPECIFIC ANTIGEN: 1.45 NG/ML (ref 0–4)

## 2020-07-01 PROCEDURE — 84403 ASSAY OF TOTAL TESTOSTERONE: CPT

## 2020-07-01 PROCEDURE — 36415 COLL VENOUS BLD VENIPUNCTURE: CPT

## 2020-07-01 PROCEDURE — 84270 ASSAY OF SEX HORMONE GLOBUL: CPT

## 2020-07-01 PROCEDURE — 84153 ASSAY OF PSA TOTAL: CPT

## 2020-07-01 PROCEDURE — 83002 ASSAY OF GONADOTROPIN (LH): CPT

## 2020-07-03 LAB
SEX HORMONE BINDING GLOBULIN: 34 NMOL/L (ref 11–80)
TESTOSTERONE FREE-NONMALE: 52.5 PG/ML (ref 47–244)
TESTOSTERONE TOTAL: 274 NG/DL (ref 220–1000)

## 2020-07-07 ENCOUNTER — TELEPHONE (OUTPATIENT)
Dept: PULMONOLOGY | Age: 70
End: 2020-07-07

## 2020-07-07 NOTE — PROGRESS NOTES
Baldomero Sanon         : 1950 - [facility]    Diagnosis: [x] IFEANYI (G47.33) [] CSA (G47.31) [] Apnea (G47.30)   Length of Need: [x] 9 Months [] 99 Months [] Other:    Machine (JOSIE!): [] Respironics Dream Station      Auto [] ResMed AirSense     Auto [] Other:     []  CPAP () [] Bilevel ()   Mode: [] Auto [] Spontaneous    Mode: [] Auto [] Spontaneous                            Comfort Settings:   - Ramp Pressure: cmH2O                                        - Ramp time: 15 min                                     -  Flex/EPR - 3 full time                                    - For ResMed Bilevel (TiMax-4 sec   TiMin- 0.2 sec)     Humidifier: [x] Heated ()        [x] Water chamber replacement ()/ 1 per 6 months        Mask:   [] Nasal () /1 per 3 months [x] Full Face () /1 per 3 months   [] Patient choice -Size and fit mask [x] Patient Choice - Size and fit mask   [] Dispense:  [] Dispense:    [] Headgear () / 1 per 3 months [x] Headgear () / 1 per 3 months   [] Replacement Nasal Cushion ()/2 per month [x] Interface Replacement ()/1 per month   [] Replacement Nasal Pillows ()/2 per month         Tubing: [x] Heated ()/1 per 3 months    [] Standard ()/1 per 3 months [] Other:           Filters: [x] Non-disposable ()/1 per 6 months     [x] Ultra-Fine, Disposable ()/2 per month        Miscellaneous: [] Chin Strap ()/ 1 per 6 months [] O2 bleed-in:       LPM   [] Oximetry on CPAP/Bilevel []  Other:    [] Modem: ()         Start Order Date: 20    MEDICAL JUSTIFICATION:  I, the undersigned, certify that the above prescribed supplies are medically necessary for this patients wellbeing. In my opinion, the supplies are both reasonable and necessary in reference to accepted standards of medicalpractice in treatment of this patients condition.     KEYUR Colorado - CNP      NPI: [id number]       Order Signed Date: 07/07/20    Electronically signed by KEYUR Colorado CNP on 7/7/2020 at 1:40 PM

## 2020-07-07 NOTE — TELEPHONE ENCOUNTER
Pt switched insurance, now with [employer], stated he needs a new order for Pap supplies.      Pt # [phone]

## 2020-09-08 ENCOUNTER — TELEPHONE (OUTPATIENT)
Dept: CARDIOLOGY CLINIC | Age: 70
End: 2020-09-08

## 2020-09-08 NOTE — TELEPHONE ENCOUNTER
Received a clearance request for penile plication from Dr. Brennan Frausto office. Surgery is scheduled 9/16/20 (in 8 days). Pt has hx of CAD with PCI in 2013. Last nuclear stress test was in 2015. Patient reported exertional shortness of breath at 6/24/20 office visit. Per Dr. Miranda Clemente - patient should undergo Myoview GXT for risk assessment. If patient is unable to reach target heart rate, stress test can be converted to [place]. [facility] for patient to call office back to discuss clearance. Did not include in message that stress test is needed for clearance. Order placed in [place].

## 2020-09-09 ENCOUNTER — TELEPHONE (OUTPATIENT)
Dept: CARDIOLOGY CLINIC | Age: 70
End: 2020-09-09

## 2020-09-09 NOTE — TELEPHONE ENCOUNTER
Spoke to patient and explained need for nuclear stress test to determine risk assessment for surgery. Patient scheduled procedure which will be done in the morning of 9/15/20 at [facility]. Reviewed test instructions with patient.   Will check with Dr. Tari Chris to see if Metoprolol 25 mg BID needs to be held prior to test.

## 2020-09-09 NOTE — TELEPHONE ENCOUNTER
LM with pts sister Ghada Brooks to have pt call me we need to jaspreet his Stress Test from [facility] 09/15 to [facility] on 09/11 1:15pm ok per Harika in Stress Lab. Need to know if pt can make this?  Thank you
2 person assist/verbal cues

## 2020-09-10 DIAGNOSIS — I25.10 CORONARY ARTERY DISEASE DUE TO LIPID RICH PLAQUE: Primary | ICD-10-CM

## 2020-09-10 DIAGNOSIS — I25.83 CORONARY ARTERY DISEASE DUE TO LIPID RICH PLAQUE: Primary | ICD-10-CM

## 2020-09-10 DIAGNOSIS — R06.02 SHORTNESS OF BREATH: ICD-10-CM

## 2020-09-10 DIAGNOSIS — Z01.818 PRE-OPERATIVE CLEARANCE: ICD-10-CM

## 2020-09-11 ENCOUNTER — TELEPHONE (OUTPATIENT)
Dept: CARDIOLOGY CLINIC | Age: 70
End: 2020-09-11

## 2020-09-11 ENCOUNTER — HOSPITAL ENCOUNTER (OUTPATIENT)
Dept: NON INVASIVE DIAGNOSTICS | Age: 70
Discharge: HOME | End: 2020-09-11
Payer: MEDICARE

## 2020-09-11 LAB
LV EF: 65 %
LVEF MODALITY: NORMAL

## 2020-09-11 PROCEDURE — A9502 TC99M TETROFOSMIN: HCPCS | Performed by: INTERNAL MEDICINE

## 2020-09-11 PROCEDURE — 3430000000 HC RX DIAGNOSTIC RADIOPHARMACEUTICAL: Performed by: INTERNAL MEDICINE

## 2020-09-11 PROCEDURE — 93017 CV STRESS TEST TRACING ONLY: CPT | Performed by: INTERNAL MEDICINE

## 2020-09-11 PROCEDURE — 78452 HT MUSCLE IMAGE SPECT MULT: CPT | Performed by: INTERNAL MEDICINE

## 2020-09-11 RX ADMIN — TETROFOSMIN 30 MILLICURIE: 1.38 INJECTION, POWDER, LYOPHILIZED, FOR SOLUTION INTRAVENOUS at 14:33

## 2020-09-11 RX ADMIN — TETROFOSMIN 10 MILLICURIE: 1.38 INJECTION, POWDER, LYOPHILIZED, FOR SOLUTION INTRAVENOUS at 13:49

## 2020-09-11 NOTE — TELEPHONE ENCOUNTER
LMOR that patient is only on ASA ( no other forms of a blood thinner). Stress test was normal, patient can proceed with surgery. He can hold ASA up to 7 days before procedure and should be resumed when it is safe from the bleeding perspective. Clearance form was faxed to [facility] successfully. Instructed to call office next business day if he has any questions about message.

## 2020-09-11 NOTE — PROGRESS NOTES
Patient instructed on Nathan Protocol Stress Test Procedure including possible side effects and adverse reactions. Verbalizes knowledge and understanding and denies having any questions.

## 2020-09-11 NOTE — TELEPHONE ENCOUNTER
Asking to speak to MR RN about his meds . Is he on any other blood thinner other than ASA ? He also needs a letter of clearance for his surgery .

## 2020-09-23 ENCOUNTER — APPOINTMENT (OUTPATIENT)
Dept: GENERAL RADIOLOGY | Age: 70
End: 2020-09-23
Payer: MEDICARE

## 2020-09-23 ENCOUNTER — APPOINTMENT (OUTPATIENT)
Dept: CT IMAGING | Age: 70
End: 2020-09-23
Payer: MEDICARE

## 2020-09-23 ENCOUNTER — HOSPITAL ENCOUNTER (EMERGENCY)
Age: 70
Discharge: HOME | End: 2020-09-23
Payer: MEDICARE

## 2020-09-23 VITALS
DIASTOLIC BLOOD PRESSURE: 66 MMHG | HEIGHT: 69 IN | RESPIRATION RATE: 16 BRPM | TEMPERATURE: 97.9 F | WEIGHT: 200 LBS | HEART RATE: 74 BPM | OXYGEN SATURATION: 96 % | SYSTOLIC BLOOD PRESSURE: 142 MMHG | BODY MASS INDEX: 29.62 KG/M2

## 2020-09-23 LAB
A/G RATIO: 1.5 (ref 1.1–2.2)
ALBUMIN SERPL-MCNC: 4.4 G/DL (ref 3.4–5)
ALP BLD-CCNC: 82 U/L (ref 40–129)
ALT SERPL-CCNC: 12 U/L (ref 10–40)
ANION GAP SERPL CALCULATED.3IONS-SCNC: 12 MMOL/L (ref 3–16)
AST SERPL-CCNC: 12 U/L (ref 15–37)
BASE EXCESS VENOUS: 1.8 MMOL/L (ref -3–3)
BASOPHILS ABSOLUTE: 0 K/UL (ref 0–0.2)
BASOPHILS RELATIVE PERCENT: 0.6 %
BETA-HYDROXYBUTYRATE: 0.1 MMOL/L (ref 0–0.27)
BILIRUB SERPL-MCNC: 0.4 MG/DL (ref 0–1)
BILIRUBIN URINE: NEGATIVE
BLOOD, URINE: NEGATIVE
BUN BLDV-MCNC: 16 MG/DL (ref 7–20)
CALCIUM SERPL-MCNC: 9.8 MG/DL (ref 8.3–10.6)
CARBOXYHEMOGLOBIN: 7.3 % (ref 0–1.5)
CHLORIDE BLD-SCNC: 99 MMOL/L (ref 99–110)
CLARITY: CLEAR
CO2: 26 MMOL/L (ref 21–32)
COLOR: YELLOW
CREAT SERPL-MCNC: 1 MG/DL (ref 0.8–1.3)
EOSINOPHILS ABSOLUTE: 0.1 K/UL (ref 0–0.6)
EOSINOPHILS RELATIVE PERCENT: 1.3 %
GFR AFRICAN AMERICAN: >60
GFR NON-AFRICAN AMERICAN: >60
GLOBULIN: 2.9 G/DL
GLUCOSE BLD-MCNC: 226 MG/DL (ref 70–99)
GLUCOSE URINE: 500 MG/DL
HCO3 VENOUS: 28.5 MMOL/L (ref 23–29)
HCT VFR BLD CALC: 44.5 % (ref 40.5–52.5)
HEMOGLOBIN, VEN, REDUCED: 5 %
HEMOGLOBIN: 15.8 G/DL (ref 13.5–17.5)
KETONES, URINE: NEGATIVE MG/DL
LACTIC ACID, SEPSIS: 1.8 MMOL/L (ref 0.4–1.9)
LEUKOCYTE ESTERASE, URINE: NEGATIVE
LIPASE: 26 U/L (ref 13–60)
LYMPHOCYTES ABSOLUTE: 2.6 K/UL (ref 1–5.1)
LYMPHOCYTES RELATIVE PERCENT: 37.5 %
MCH RBC QN AUTO: 31.1 PG (ref 26–34)
MCHC RBC AUTO-ENTMCNC: 35.4 G/DL (ref 31–36)
MCV RBC AUTO: 87.8 FL (ref 80–100)
METHEMOGLOBIN VENOUS: 0.2 %
MICROSCOPIC EXAMINATION: ABNORMAL
MONOCYTES ABSOLUTE: 0.6 K/UL (ref 0–1.3)
MONOCYTES RELATIVE PERCENT: 8.7 %
NEUTROPHILS ABSOLUTE: 3.6 K/UL (ref 1.7–7.7)
NEUTROPHILS RELATIVE PERCENT: 51.9 %
NITRITE, URINE: NEGATIVE
O2 CONTENT, VEN: 18 VOL %
O2 SAT, VEN: 94 %
O2 THERAPY: ABNORMAL
PCO2, VEN: 51.6 MMHG (ref 40–50)
PDW BLD-RTO: 13.4 % (ref 12.4–15.4)
PH UA: 6 (ref 5–8)
PH VENOUS: 7.35 (ref 7.35–7.45)
PLATELET # BLD: 163 K/UL (ref 135–450)
PMV BLD AUTO: 8.7 FL (ref 5–10.5)
PO2, VEN: 69.5 MMHG (ref 25–40)
POTASSIUM REFLEX MAGNESIUM: 3.6 MMOL/L (ref 3.5–5.1)
PROTEIN UA: NEGATIVE MG/DL
RBC # BLD: 5.07 M/UL (ref 4.2–5.9)
SODIUM BLD-SCNC: 137 MMOL/L (ref 136–145)
SPECIFIC GRAVITY UA: 1.01 (ref 1–1.03)
TCO2 CALC VENOUS: 67 MMOL/L
TOTAL PROTEIN: 7.3 G/DL (ref 6.4–8.2)
TROPONIN: <0.01 NG/ML
URINE REFLEX TO CULTURE: ABNORMAL
URINE TYPE: ABNORMAL
UROBILINOGEN, URINE: 1 E.U./DL
WBC # BLD: 6.8 K/UL (ref 4–11)

## 2020-09-23 PROCEDURE — 71045 X-RAY EXAM CHEST 1 VIEW: CPT

## 2020-09-23 PROCEDURE — 6370000000 HC RX 637 (ALT 250 FOR IP): Performed by: PHYSICIAN ASSISTANT

## 2020-09-23 PROCEDURE — 83605 ASSAY OF LACTIC ACID: CPT

## 2020-09-23 PROCEDURE — 80053 COMPREHEN METABOLIC PANEL: CPT

## 2020-09-23 PROCEDURE — 81003 URINALYSIS AUTO W/O SCOPE: CPT

## 2020-09-23 PROCEDURE — 36415 COLL VENOUS BLD VENIPUNCTURE: CPT

## 2020-09-23 PROCEDURE — 96374 THER/PROPH/DIAG INJ IV PUSH: CPT

## 2020-09-23 PROCEDURE — 82010 KETONE BODYS QUAN: CPT

## 2020-09-23 PROCEDURE — 74176 CT ABD & PELVIS W/O CONTRAST: CPT

## 2020-09-23 PROCEDURE — 85025 COMPLETE CBC W/AUTO DIFF WBC: CPT

## 2020-09-23 PROCEDURE — 99284 EMERGENCY DEPT VISIT MOD MDM: CPT

## 2020-09-23 PROCEDURE — 82803 BLOOD GASES ANY COMBINATION: CPT

## 2020-09-23 PROCEDURE — 83690 ASSAY OF LIPASE: CPT

## 2020-09-23 PROCEDURE — 84484 ASSAY OF TROPONIN QUANT: CPT

## 2020-09-23 PROCEDURE — 6360000002 HC RX W HCPCS: Performed by: PHYSICIAN ASSISTANT

## 2020-09-23 PROCEDURE — 93005 ELECTROCARDIOGRAM TRACING: CPT | Performed by: PHYSICIAN ASSISTANT

## 2020-09-23 PROCEDURE — 2580000003 HC RX 258: Performed by: PHYSICIAN ASSISTANT

## 2020-09-23 RX ORDER — DICYCLOMINE HYDROCHLORIDE 10 MG/1
10 CAPSULE ORAL ONCE
Status: COMPLETED | OUTPATIENT
Start: 2020-09-23 | End: 2020-09-23

## 2020-09-23 RX ORDER — ONDANSETRON 4 MG/1
4 TABLET, FILM COATED ORAL EVERY 8 HOURS PRN
Qty: 20 TABLET | Refills: 0 | Status: SHIPPED | OUTPATIENT
Start: 2020-09-23 | End: 2020-12-16

## 2020-09-23 RX ORDER — DICYCLOMINE HYDROCHLORIDE 10 MG/1
10 CAPSULE ORAL EVERY 6 HOURS PRN
Qty: 20 CAPSULE | Refills: 0 | Status: SHIPPED | OUTPATIENT
Start: 2020-09-23 | End: 2020-12-16

## 2020-09-23 RX ORDER — ONDANSETRON 2 MG/ML
4 INJECTION INTRAMUSCULAR; INTRAVENOUS ONCE
Status: COMPLETED | OUTPATIENT
Start: 2020-09-23 | End: 2020-09-23

## 2020-09-23 RX ORDER — 0.9 % SODIUM CHLORIDE 0.9 %
1000 INTRAVENOUS SOLUTION INTRAVENOUS ONCE
Status: COMPLETED | OUTPATIENT
Start: 2020-09-23 | End: 2020-09-23

## 2020-09-23 RX ADMIN — SODIUM CHLORIDE 1000 ML: 9 INJECTION, SOLUTION INTRAVENOUS at 19:35

## 2020-09-23 RX ADMIN — ONDANSETRON 4 MG: 2 INJECTION INTRAMUSCULAR; INTRAVENOUS at 19:35

## 2020-09-23 RX ADMIN — LIDOCAINE HYDROCHLORIDE: 20 SOLUTION ORAL; TOPICAL at 19:35

## 2020-09-23 RX ADMIN — DICYCLOMINE HYDROCHLORIDE 10 MG: 10 CAPSULE ORAL at 19:35

## 2020-09-23 ASSESSMENT — ENCOUNTER SYMPTOMS
ABDOMINAL PAIN: 1
VOMITING: 0
ABDOMINAL DISTENTION: 0
NAUSEA: 1
DIARRHEA: 1
SHORTNESS OF BREATH: 0

## 2020-09-23 ASSESSMENT — PAIN SCALES - GENERAL: PAINLEVEL_OUTOF10: 4

## 2020-09-23 ASSESSMENT — PAIN DESCRIPTION - LOCATION: LOCATION: ABDOMEN

## 2020-09-23 ASSESSMENT — PAIN DESCRIPTION - ORIENTATION: ORIENTATION: MID

## 2020-09-23 ASSESSMENT — PAIN DESCRIPTION - PAIN TYPE: TYPE: ACUTE PAIN

## 2020-09-23 NOTE — ED PROVIDER NOTES
[address] [facility]        Pt Name: Wendy Keller  MRN: [id number]  [place] 1950  Date of evaluation: 9/23/2020  Provider: Moody Blanco PA-C  PCP: Rashid Vidal MD    SAMMY. I have evaluated this patient. My supervising physician was available for consultation. CHIEF COMPLAINT       Chief Complaint   Patient presents with    Abdominal Pain     pt brought in by [facility] c/o pt has some frozen white castles in the middle of the night and has abd pain and nausea. pt is concerned for food poisoning       HISTORY OF PRESENT ILLNESS   (Location, Timing/Onset, Context/Setting, Quality, Duration, Modifying Factors, Severity, Associated Signs and Symptoms)  Note limiting factors. Wendy Keller is a 72 y.o. male patient presents emergency department for evaluation of abdominal pain and diarrhea. Patient states that he ate some frozen white [place] sandwiches that were not fully thawed in the microwave last night for dinner. Patient thought it would be okay to eat them as they are pre-cooked and states they tasted all right. Patient states he woke up in the middle of the night feeling nauseated. Patient states he has epigastric abdominal pain and one bowel movement of loose stool. He has not vomited. No blood in the diarrhea. Patient states he was concerned at first as the nausea made him feel funny\" and diaphoretic. Patient was concerned that this could possibly be another heart attack as he did not have typical chest pain symptoms when he had his heart attack in 2013. [place] Patient states he passed a stress test last week as well with Dr. Zuleima Orozco. Patient will be discharged patient has not taken any medications to relieve his symptoms. Patient states laying down the bed has made him feel better. He states nothing really makes it worse. Patient states he has not taken any of his daily medications due to feeling so nauseated.   He divalproex (DEPAKOTE ER) 500 MG extended release tablet Take 1,000 mg by mouth dailyHistorical Med      atorvastatin (LIPITOR) 80 MG tablet TAKE 1 TABLET BY MOUTH EVERY DAY, Disp-30 tablet, R-1Pt needs labs for future refills. Normal      METFORMIN HCL PO Take 1,000 mg by mouth 2 times daily Historical Med      VASCEPA 1 g CAPS capsule TAKE 2 CAPSULES BY MOUTH TWICE A DAY WITH FOOD, R-2, DAWHistorical Med      aspirin EC 81 MG EC tablet Take 1 tablet by mouth daily, Disp-90 tablet, R-3OTC      hydrochlorothiazide (HYDRODIURIL) 25 MG tablet TAKE 1 TABLET BY MOUTH EVERY DAY, R-2Historical Med      metoprolol tartrate (LOPRESSOR) 25 MG tablet TAKE 1 TABLET BY MOUTH 2 TIMES DAILY, Disp-180 tablet, R-1Normal      omeprazole (PRILOSEC) 20 MG delayed release capsule Take 20 mg by mouth dailyHistorical Med      lisinopril (PRINIVIL;ZESTRIL) 20 MG tablet TAKE 1 TABLET BY MOUTH DAILY, Disp-90 tablet, R-3Normal      Blood Pressure Monitoring (BLOOD PRESSURE CUFF) MISC DAILY Starting 6/2/2017, Until Discontinued, Disp-1 each, R-0, Normal      FLUoxetine (PROZAC) 20 MG capsule Take 1 capsule by mouth daily, Disp-30 capsule, R-2      tamsulosin (FLOMAX) 0.4 MG capsule TAKE ONE CAPSULE BY MOUTH EVERY DAY IN THE EVENING, Disp-30 capsule, R-2      diphenoxylate-atropine (LOMOTIL) 2.5-0.025 MG per tablet 1 tid, Disp-90 tablet, R-0      Cholecalciferol (VITAMIN D3) 50 MCG (2000 UT) CAPS Take 1 capsule by mouthHistorical Med      REPATHA SURECLICK 870 MG/ML SOAJ INJECT 1 PEN SC EVERY 2 WEEKS, R-11, DAWHistorical Med      potassium chloride (KLOR-CON) 10 MEQ extended release tablet TAKE 1 TABLET BY MOUTH EVERY DAY, R-0Historical Med               ALLERGIES     Penicillins; Benadryl [diphenhydramine hcl];  Erythromycin; and Iodine    FAMILYHISTORY       Family History   Problem Relation Age of Onset    High Blood Pressure Mother     Diabetes Father     Heart Disease Father     High Blood Pressure Father           SOCIAL HISTORY Social History     Tobacco Use    Smoking status: Current Some Day Smoker     Packs/day: 0.50     Years: 10.00     Pack years: 5.00     Types: Cigarettes     Last attempt to quit: 5/26/2017     Years since quitting: 3.3    Smokeless tobacco: Never Used   Substance Use Topics    Alcohol use: No    Drug use: No       SCREENINGS             PHYSICAL EXAM    (up to 7 for level 4, 8 or more for level 5)     ED Triage Vitals [09/23/20 1636]   BP Temp Temp Source Pulse Resp SpO2 Height Weight   130/84 97.9 °F (36.6 °C) Oral 74 16 96 % 5' 9\" (1.753 m) 200 lb (90.7 kg)       Physical Exam  Vitals signs and nursing note reviewed. Constitutional:       General: He is not in acute distress. Appearance: He is well-developed. He is not ill-appearing, toxic-appearing or diaphoretic. HENT:      Head: Normocephalic and atraumatic. Nose: Nose normal.   Eyes:      General:         Right eye: No discharge. Left eye: No discharge. Neck:      Musculoskeletal: Normal range of motion and neck supple. Cardiovascular:      Rate and Rhythm: Normal rate and regular rhythm. Heart sounds: Normal heart sounds. No murmur. No gallop. Pulmonary:      Effort: Pulmonary effort is normal. No respiratory distress. Breath sounds: Normal breath sounds. No wheezing, rhonchi or rales. Abdominal:      General: Abdomen is protuberant. Bowel sounds are normal.      Palpations: Abdomen is soft. Tenderness: There is abdominal tenderness in the epigastric area. There is no right CVA tenderness, left CVA tenderness, guarding or rebound. Negative signs include Sharp's sign. Comments: Obese abdomen   Musculoskeletal: Normal range of motion. Skin:     General: Skin is warm and dry. Capillary Refill: Capillary refill takes less than 2 seconds. Coloration: Skin is not pale. Findings: No rash. Neurological:      General: No focal deficit present.       Mental Status: He is alert and oriented to person, place, and time.    Psychiatric:         Mood and Affect: Mood normal.         Behavior: Behavior normal.         DIAGNOSTIC RESULTS   LABS:    Labs Reviewed   COMPREHENSIVE METABOLIC PANEL W/ REFLEX TO MG FOR LOW K - Abnormal; Notable for the following components:       Result Value    Glucose 226 (*)     AST 12 (*)     All other components within normal limits    Narrative:     Performed at:  [facility] [address] [employer], [place]   Phone [phone]   BLOOD GAS, VENOUS - Abnormal; Notable for the following components:    pCO2, Raymond 51.6 (*)     pO2, Raymond 69.5 (*)     Carboxyhemoglobin 7.3 (*)     All other components within normal limits    Narrative:     Performed at:  [facility] [address]   Phone [phone]   URINE RT REFLEX TO CULTURE - Abnormal; Notable for the following components:    Glucose, Ur 500 (*)     All other components within normal limits    Narrative:     Performed at:  [facility] [email or web address], [place]   Phone [phone]   CBC WITH AUTO DIFFERENTIAL    Narrative:     Performed at:  [facility] [email or web address], [place]   Phone [phone]   LIPASE    Narrative:     Performed at:  [facility] [email or web address], [place]   Phone [phone]   TROPONIN    Narrative:     Performed at:  [facility] [email or web address], [place]   Phone [phone]   BETA-HYDROXYBUTYRATE    Narrative:     Performed at:  [facility] [email or web address], [place]   Phone [phone]   LACTATE, SEPSIS    Narrative:     Performed at:  [facility] [email or web address], [place]   Phone [phone]   LACTATE, SEPSIS       All other labs were within normal range or not returned as of this dictation. EKG: All EKG's are interpreted by the Emergency Department Physician in the absence of a cardiologist.  Please see their note for interpretation of EKG. RADIOLOGY:   Non-plain film images such as CT, Ultrasound and MRI are read by the radiologist. Plain radiographic images are visualized and preliminarily interpreted by the ED Provider with the below findings:        Interpretation per the Radiologist below, if available at the time of this note:    CT ABDOMEN PELVIS WO CONTRAST Additional Contrast? None   Final Result   No acute inflammatory abnormality is identified. No urolithiasis or obstructive uropathy. XR CHEST PORTABLE   Final Result   No acute cardiopulmonary disease is appreciated. No results found. PROCEDURES   Unless otherwise noted below, none     Procedures    CRITICAL CARE TIME   N/A    CONSULTS:  None      EMERGENCY DEPARTMENT COURSE and DIFFERENTIAL DIAGNOSIS/MDM:   Vitals:    Vitals:    09/23/20 2030 09/23/20 2045 09/23/20 2100 09/23/20 2115   BP: (!) 141/64 (!) 141/64 132/63 (!) 142/66   Pulse:       Resp:       Temp:       TempSrc:       SpO2: 97% 94% 95% 96%   Weight:       Height:           Patient was given the following medications:  Medications   0.9 % sodium chloride bolus (0 mLs Intravenous Stopped 9/23/20 2140)   ondansetron (ZOFRAN) injection 4 mg (4 mg Intravenous Given 9/23/20 1935)   dicyclomine (BENTYL) capsule 10 mg (10 mg Oral Given 9/23/20 1935)   aluminum & magnesium hydroxide-simethicone (MAALOX) 30 mL, lidocaine viscous hcl (XYLOCAINE) 5 mL (GI COCKTAIL) ( Oral Given 9/23/20 1935)         Patient presents emergency department for evaluation of nausea and abdominal pain after eating improperly reheated [facility]. Patient is alert and oriented no acute distress. Vitals are stable and he is afebrile. Patient has mild tenderness epigastric abdomen.   He has an iodine allergy. CT will be was ordered without contrast.  Patient is given Zofran and Bentyl and GI cocktail. Patient has good bowel sounds bilaterally. Lungs are clear to auscultation bilaterally. Heart rate is regular without murmurs rubs or gallops. Patient states that his MI had GI symptoms without any chest pain and thus cardiac work-up was initiated. Troponin is less than detectable. EKG shows known right bundle branch block. CT of the patient's abdomen shows no acute intra-abdominal findings. Chest x-ray shows no acute cardiopulmonary process. Laboratory evaluation shows elevated glucose however patient is a known diabetic and did not take his medications today due to his nausea. Patient has no evidence of DKA. Upon reevaluation patient states he feels much improved with medication and fluids here in the emergency department. Patient was encouraged to return for any worsening symptoms. I have low suspicion for cardiac event at this time as he has no objective or subjective findings at this time and had a negative stress test 1 week ago. Patient was given strict return precautions to return to the emergency department for any worsening symptoms. He will be discharged home with prescription for Bentyl and Zofran. FINAL IMPRESSION      1. Abdominal pain, epigastric    2. Nausea          DISPOSITION/PLAN   DISPOSITION Decision To Discharge 09/23/2020 09:51:58 PM      PATIENT REFERREDTO:  No follow-up provider specified.     DISCHARGE MEDICATIONS:  Discharge Medication List as of 9/23/2020 10:00 PM      START taking these medications    Details   ondansetron (ZOFRAN) 4 MG tablet Take 1 tablet by mouth every 8 hours as needed for Nausea, Disp-20 tablet,R-0Print      dicyclomine (BENTYL) 10 MG capsule Take 1 capsule by mouth every 6 hours as needed (cramps), Disp-20 capsule,R-0Print             DISCONTINUED MEDICATIONS:  Discharge Medication List as of 9/23/2020 10:00 PM                 (Please note that portions of this note were completed with a voice recognition program.  Efforts were made to edit the dictations but occasionally words are mis-transcribed.)    Phoenix Vallejo PA-C (electronically signed)            Phoenix Vallejo PA-C  09/24/20 0005

## 2020-09-24 LAB
EKG ATRIAL RATE: 68 BPM
EKG DIAGNOSIS: NORMAL
EKG P AXIS: 54 DEGREES
EKG P-R INTERVAL: 144 MS
EKG Q-T INTERVAL: 414 MS
EKG QRS DURATION: 102 MS
EKG QTC CALCULATION (BAZETT): 440 MS
EKG R AXIS: -23 DEGREES
EKG T AXIS: 56 DEGREES
EKG VENTRICULAR RATE: 68 BPM

## 2020-09-24 PROCEDURE — 93010 ELECTROCARDIOGRAM REPORT: CPT | Performed by: INTERNAL MEDICINE

## 2020-09-24 NOTE — ED PROVIDER NOTES
Note that I was asked by the SAMMY to perform an EKG interpretation but did not participate directly in the care of this patient. EKG: EKG interpretation by ED physician: Normal axis, normal sinus rhythm at a rate of 68 bpm. Incomplete RBB present. No obvious ischemic ST changes.           Denise Carpio DO  09/23/20 5038

## 2020-09-24 NOTE — ED NOTES
Discharge instructions given, patient acknowledged understanding, rx given x2, patient was taken to car by wheelchair to waiting room to wait on 1800 Sabina Newby RN  09/23/20 3995

## 2020-12-07 ENCOUNTER — OFFICE VISIT (OUTPATIENT)
Dept: NEUROLOGY | Age: 70
End: 2020-12-07
Payer: MEDICARE

## 2020-12-07 VITALS
SYSTOLIC BLOOD PRESSURE: 130 MMHG | RESPIRATION RATE: 14 BRPM | TEMPERATURE: 96.8 F | HEART RATE: 71 BPM | BODY MASS INDEX: 30.66 KG/M2 | DIASTOLIC BLOOD PRESSURE: 64 MMHG | HEIGHT: 69 IN | WEIGHT: 207 LBS

## 2020-12-07 PROCEDURE — 99213 OFFICE O/P EST LOW 20 MIN: CPT | Performed by: PSYCHIATRY & NEUROLOGY

## 2020-12-07 RX ORDER — DIVALPROEX SODIUM 500 MG/1
TABLET, EXTENDED RELEASE ORAL
Qty: 270 TABLET | Refills: 1 | Status: SHIPPED | OUTPATIENT
Start: 2020-12-07 | End: 2021-03-12

## 2020-12-07 NOTE — PROGRESS NOTES
Needs    Financial resource strain: None    Food insecurity     Worry: None     Inability: None    Transportation needs     Medical: None     Non-medical: None   Tobacco Use    Smoking status: Current Some Day Smoker     Packs/day: 0.50     Years: 10.00     Pack years: 5.00     Types: Cigarettes     Last attempt to quit: 5/26/2017     Years since quitting: 3.5    Smokeless tobacco: Never Used   Substance and Sexual Activity    Alcohol use: No    Drug use: No    Sexual activity: Yes     Partners: Female   Lifestyle    Physical activity     Days per week: None     Minutes per session: None    Stress: None   Relationships    Social connections     Talks on phone: None     Gets together: None     Attends Worship service: None     Active member of club or organization: None     Attends meetings of clubs or organizations: None     Relationship status: None    Intimate partner violence     Fear of current or ex partner: None     Emotionally abused: None     Physically abused: None     Forced sexual activity: None   Other Topics Concern    None   Social History Narrative    None        Objective:  Exam:  /64   Pulse 71   Temp 96.8 °F (36 °C)   Resp 14   Ht 5' 9\" (1.753 m)   Wt 207 lb (93.9 kg)   BMI 30.57 kg/m²   Patient is awake, alert and oriented x3. Speech is normal.  Pupils are equal round reacting to light. Extraocular movements intact. Face symmetrical. Tongue midline. Motor exam shows normal symmetrical strength. Deep tendon reflexes normal. Plantar reflexes downgoing. Sensory exam normal. Coordination normal. Gait normal. No carotid bruit. No neck stiffness.       Data :  LABS:  General Labs:    CBC:   Lab Results   Component Value Date    WBC 6.8 09/23/2020    RBC 5.07 09/23/2020    HGB 15.8 09/23/2020    HCT 44.5 09/23/2020    MCV 87.8 09/23/2020    MCH 31.1 09/23/2020    MCHC 35.4 09/23/2020    RDW 13.4 09/23/2020     09/23/2020    MPV 8.7 09/23/2020     BMP:    Lab Results Component Value Date     09/23/2020    K 3.6 09/23/2020    CL 99 09/23/2020    CO2 26 09/23/2020    BUN 16 09/23/2020    LABALBU 4.4 09/23/2020    CREATININE 1.0 09/23/2020    CALCIUM 9.8 09/23/2020    GFRAA >60 09/23/2020    GFRAA >60 04/12/2013    LABGLOM >60 09/23/2020    GLUCOSE 226 09/23/2020     RADIOLOGY REVIEW:  I have reviewed radiology report(s) of: MRI brain    Impression :  Partial simple seizures  Patient has longstanding thrombocytopenia  MRI brain normal  Patient reports that the right hand rest tremor has improved    Plan :  Refill Depakote  mg 3 tablets daily  Return in 6 months          Please note a portion of  this chart was generated using dragon dictation software. Although every effort was made to ensure the accuracy of this automated transcription, some errors in transcription may have occurred. Pursuant to the emergency declaration under the [facility], [facility] waiver authority and the [employer] and [employer] Act, this Virtual  Visit was conducted, with patient's consent, to reduce the patient's risk of exposure to COVID-19 and provide continuity of care for an established patient.

## 2020-12-10 ENCOUNTER — VIRTUAL VISIT (OUTPATIENT)
Dept: PULMONOLOGY | Age: 70
End: 2020-12-10
Payer: MEDICARE

## 2020-12-10 PROCEDURE — 99443 PR PHYS/QHP TELEPHONE EVALUATION 21-30 MIN: CPT | Performed by: NURSE PRACTITIONER

## 2020-12-10 ASSESSMENT — SLEEP AND FATIGUE QUESTIONNAIRES
HOW LIKELY ARE YOU TO NOD OFF OR FALL ASLEEP WHILE SITTING INACTIVE IN A PUBLIC PLACE: 0
ESS TOTAL SCORE: 1
HOW LIKELY ARE YOU TO NOD OFF OR FALL ASLEEP WHILE SITTING AND TALKING TO SOMEONE: 0
HOW LIKELY ARE YOU TO NOD OFF OR FALL ASLEEP WHILE SITTING QUIETLY AFTER LUNCH WITHOUT ALCOHOL: 0
HOW LIKELY ARE YOU TO NOD OFF OR FALL ASLEEP IN A CAR, WHILE STOPPED FOR A FEW MINUTES IN TRAFFIC: 0
HOW LIKELY ARE YOU TO NOD OFF OR FALL ASLEEP WHILE LYING DOWN TO REST IN THE AFTERNOON WHEN CIRCUMSTANCES PERMIT: 1
HOW LIKELY ARE YOU TO NOD OFF OR FALL ASLEEP WHILE SITTING AND READING: 0
HOW LIKELY ARE YOU TO NOD OFF OR FALL ASLEEP WHILE WATCHING TV: 0
HOW LIKELY ARE YOU TO NOD OFF OR FALL ASLEEP WHEN YOU ARE A PASSENGER IN A CAR FOR AN HOUR WITHOUT A BREAK: 0

## 2020-12-10 NOTE — ASSESSMENT & PLAN NOTE
Unable to Review compliance download with pt. Supplies and parts as needed for his machine. These are medically necessary. Continue medications per his PCP and other physicians. Limit caffeine use after 3pm.  Encouraged him to work on weight loss through diet and exercise. Diagnoses of Uncontrolled type 2 diabetes mellitus with complication, without long-term current use of insulin ([facility] [address] Gastroesophageal reflux disease without esophagitis, Essential hypertension, and Coronary artery disease due to lipid rich plaque were pertinent to this visit. The chronic medical conditions listed are directly related to the primary diagnosis listed above. The management of the primary diagnosis affects the secondary diagnosis and vice versa.

## 2020-12-10 NOTE — PROGRESS NOTES
Lawyer Marlon HALE, FAASM, [facility]  Stephan Solorio, MSN, RN, CNP     [address] [facility] SLEEP MEDICINE  [place] [address] [address] [facility] [address]  Dept: [phone]  Dept Fax: [phone]: [address] [facility] SLEEP MEDICINE  [address] [place] [id number] [phone]    Subjective:     Patient ID: Jacques Manriquez is a 72 y.o. male. Chief Complaint   Patient presents with    Sleep Apnea       HPI:      Sleep Medicine Telephone Visit    Pursuant to the emergency declaration under the [facility], [facility] waiver authority and the [employer] and [employer] Act this Telephone Visit was insisted, with patient's consent, to reduce the patient's risk of exposure to COVID-19 and provide continuity of care for an established patient. Services were provided through a synchronous discussion over a telephone and/or Video chat to substitute for in-person clinic visit, and coded as such. While patient is at home. Machine Modem/Download Info:                                              [place] - Total score: 1    Follow-up :     Last Visit : April 2020      Patient reports the listed chronic Co-morbidities: DM,GERD, HTN, CAD, Obesity    are well controlled and stable at this time. Subjective Health Changes: None      Over Night Oximetry: [] Yes  [] No  [x] NA [] WNL   Using O2: [] Yes  [] No  [x] NA   Patient is compliant with the machine  Per patient    Feeling rested when using the machine   [x] Yes  [] No     Pressure is comfortable with inspiration and expiration  [x] Yes  [] No     Noticed changes in pressure   [] Yes  [] No  [x] NA   Mask is fitting well  [x] Yes  [] No   Noting Mask Air Leak  [] Yes  [x] No   Having painful Aerophagia  [] Yes  [x] No   Nocturia   0-1  per night.    Having  HA upon waking  [] Yes  [x] No   Dry mouth upon waking   Dry Nose  Dry Eyes  [x] Yes  [] No   Congestion upon waking   [] Yes  [x] No    Nose Bleeds  [] Yes  [x] No   Using Sleep Aides    [x] NA   Understands how to change humidification and/or tubing temperature for comfort while at home  [x] Yes  [] No     Difficulties falling asleep  [] Yes  [x] No   Difficulties staying asleep  [] Yes  [x] No   Approximate time to bed  9-9:30pm   Approximate wake time  6-6:30am   Taking Naps  occasional   If taking naps usual length  1-1.5 hours    If taking naps using the machine  [] Yes  [x] No  [] NA [] With and With out    Drowsy when driving  [] Yes  [x] No     Does patient carry a DOT/CDL  [] Yes  [x] No     Does patient carry FAA/[employer] License   [] Yes  [x] No      Any concerns noted with the machine at this time  [x] Yes  [] No    Patient ordered a new machine:   Concerns with the machine: not working the same  Increased symptoms include but are not limited to daytime sleepiness    Machine is greater then 11years old       Diagnosis Orders   1. Obstructive sleep apnea     2. Uncontrolled type 2 diabetes mellitus with complication, without long-term current use of insulin ([address]     3. Gastroesophageal reflux disease without esophagitis     4. Essential hypertension     5. Coronary artery disease due to lipid rich plaque         The chronic medical conditions listed are directly related to the primary diagnosis listed above. The management of the primary diagnosis affects the secondary diagnosis and vice versa. Assessment/Plan:     Uncontrolled type 2 diabetes mellitus with complication, without long-term current use of insulin (HCC)  Chronic. Cont meds per PCP and other physicians. GERD (gastroesophageal reflux disease)  Chronic- Stable. Cont meds per PCP and other physicians. Essential hypertension  Chronic- Stable. Cont meds per PCP and other physicians. Coronary artery disease due to lipid rich plaque  Chronic- Stable.   Cont meds per PCP and other physicians. Obstructive sleep apnea  Unable to Review compliance download with pt. Supplies and parts as needed for his machine. These are medically necessary. Continue medications per his PCP and other physicians. Limit caffeine use after 3pm.  Encouraged him to work on weight loss through diet and exercise. Diagnoses of Uncontrolled type 2 diabetes mellitus with complication, without long-term current use of insulin ([address] Gastroesophageal reflux disease without esophagitis, Essential hypertension, and Coronary artery disease due to lipid rich plaque were pertinent to this visit. The chronic medical conditions listed are directly related to the primary diagnosis listed above. The management of the primary diagnosis affects the secondary diagnosis and vice versa. The primary encounter diagnosis was Obstructive sleep apnea. Diagnoses of Uncontrolled type 2 diabetes mellitus with complication, without long-term current use of insulin ([address] Gastroesophageal reflux disease without esophagitis, Essential hypertension, and Coronary artery disease due to lipid rich plaque were also pertinent to this visit. The chronic medical conditions listed are directly related to the primary diagnosis listed above. The management of the primary diagnosis affects the secondary diagnosis and vice versa. - Educated patient and unable review down load from modem with the patient   - Continue medications per his PCP and other physicians.   - he instructed not to drive unless had 4 hrs of effective therapy for his IFEANYI the night before. - Did review the risks of under or untreated IFEANYI including, but not limited to, higher risks of motor vehicle accidents, stroke, heart attacks, and death. - he understands and accepts all these risks. - The patient is advised to use the machine every night.   - Patient using  [place] for supplies  - Patient not able to access video feed.  Visit completed via telephone communications. 25 min spent with patient.   - Educated on obtaining the new machine   - Patient was benefiting when and doing well when the machine was working fully   -F/U: 3 months      No orders of the defined types were placed in this encounter. No orders of the defined types were placed in this encounter. No orders of the defined types were placed in this encounter.       NANCY Ruvalcaba, RN, CNP

## 2020-12-10 NOTE — PROGRESS NOTES
Elma Marley         : 1950    Diagnosis: [x] IFEANYI (G47.33) [] CSA (G47.31) [] Apnea (G47.30)   Length of Need: [x] 12 Months [] 99 Months [] Other:    Machine (JOSIE!): [x] Respironics Dream Station      Auto [] ResMed AirSense     Auto [] Other:     [x]  CPAP () [] Bilevel ()   Mode: [x] Auto [] Spontaneous    Mode: [] Auto [] Spontaneous        APAP - Settings  Pressure Min: 11 cmH2O  Pressure Max: 20 cmH2O                   Comfort Settings:   - Ramp Pressure: 6 cmH2O                                        - Ramp time: 15 min                                     -  Flex/EPR - 3 full time                                    - For ResMed Bilevel (TiMax-4 sec   TiMin- 0.2 sec)     Humidifier: [x] Heated ()        [x] Water chamber replacement ()/ 1 per 6 months        Mask:   [] Nasal () /1 per 3 months [x] Full Face () /1 per 3 months   [] Patient choice -Size and fit mask [x] Patient Choice - Size and fit mask   [] Dispense:  [] Dispense:    [] Headgear () / 1 per 3 months [x] Headgear () / 1 per 3 months   [] Replacement Nasal Cushion ()/2 per month [x] Interface Replacement ()/1 per month   [] Replacement Nasal Pillows ()/2 per month         Tubing: [x] Heated ()/1 per 3 months    [] Standard ()/1 per 3 months [] Other:           Filters: [x] Non-disposable ()/1 per 6 months     [x] Ultra-Fine, Disposable ()/2 per month        Miscellaneous: [] Chin Strap ()/ 1 per 6 months [] O2 bleed-in:       LPM   [] Oximetry on CPAP/Bilevel []  Other:    [x] Modem: ()         Start Order Date: 12/10/20    MEDICAL JUSTIFICATION:  I, the undersigned, certify that the above prescribed supplies are medically necessary for this patients wellbeing. In my opinion, the supplies are both reasonable and necessary in reference to accepted standards of medicalpractice in treatment of this patients condition.     Adrienne Crum, APRN - CNP

## 2020-12-16 ENCOUNTER — OFFICE VISIT (OUTPATIENT)
Dept: CARDIOLOGY CLINIC | Age: 70
End: 2020-12-16
Payer: MEDICARE

## 2020-12-16 VITALS
DIASTOLIC BLOOD PRESSURE: 68 MMHG | HEIGHT: 69 IN | OXYGEN SATURATION: 97 % | WEIGHT: 208 LBS | BODY MASS INDEX: 30.81 KG/M2 | HEART RATE: 92 BPM | SYSTOLIC BLOOD PRESSURE: 132 MMHG

## 2020-12-16 PROCEDURE — 99214 OFFICE O/P EST MOD 30 MIN: CPT | Performed by: NURSE PRACTITIONER

## 2020-12-16 NOTE — PROGRESS NOTES
Current Medications:  Current Outpatient Medications   Medication Sig Dispense Refill    divalproex (DEPAKOTE ER) 500 MG extended release tablet Take 3 tablets by mouth daily (Patient taking differently: Take 1,500 mg by mouth every evening ) 270 tablet 1    atorvastatin (LIPITOR) 80 MG tablet TAKE 1 TABLET BY MOUTH EVERY DAY 30 tablet 1    METFORMIN HCL PO Take 1,000 mg by mouth 2 times daily       REPATHA SURECLICK 085 MG/ML SOAJ INJECT 1 PEN SC EVERY 2 WEEKS  11    VASCEPA 1 g CAPS capsule TAKE 2 CAPSULES BY MOUTH TWICE A DAY WITH FOOD  2    potassium chloride (KLOR-CON) 10 MEQ extended release tablet TAKE 1 TABLET BY MOUTH EVERY DAY  0    aspirin EC 81 MG EC tablet Take 1 tablet by mouth daily 90 tablet 3    hydrochlorothiazide (HYDRODIURIL) 25 MG tablet TAKE 1 TABLET BY MOUTH EVERY DAY  2    metoprolol tartrate (LOPRESSOR) 25 MG tablet TAKE 1 TABLET BY MOUTH 2 TIMES DAILY 180 tablet 1    omeprazole (PRILOSEC) 20 MG delayed release capsule Take 20 mg by mouth daily      lisinopril (PRINIVIL;ZESTRIL) 20 MG tablet TAKE 1 TABLET BY MOUTH DAILY 90 tablet 3    FLUoxetine (PROZAC) 20 MG capsule Take 1 capsule by mouth daily 30 capsule 2    tamsulosin (FLOMAX) 0.4 MG capsule TAKE ONE CAPSULE BY MOUTH EVERY DAY IN THE EVENING 30 capsule 2    diphenoxylate-atropine (LOMOTIL) 2.5-0.025 MG per tablet 1 tid 90 tablet 0    Blood Pressure Monitoring (BLOOD PRESSURE CUFF) MISC 1 Units by Does not apply route daily 1 each 0     No current facility-administered medications for this visit. REVIEW OF SYSTEMS:    CONSTITUTIONAL: No major weight gain or loss, fatigue, weakness, night sweats or fever. HEENT: No new vision difficulties or ringing in the ears. RESPIRATORY: No new SOB, PND, orthopnea or cough. CARDIOVASCULAR: See HPI  GI: No nausea, vomiting, diarrhea, constipation, abdominal pain or changes in bowel habits. : No urinary frequency, urgency, incontinence hematuria or dysuria.   SKIN: No cyanosis or skin lesions. MUSCULOSKELETAL: No new muscle or joint pain. NEUROLOGICAL: No syncope or TIA-like symptoms. PSYCHIATRIC: No anxiety, pain, insomnia or depression    Objective:   PHYSICAL EXAM:        Vitals:    12/16/20 0936 12/16/20 1010   BP: 120/70 132/68   Site: Left Upper Arm    Position: Sitting    Cuff Size: Large Adult    Pulse: 92    SpO2: 97%    Weight: 208 lb (94.3 kg)    Height: 5' 9\" (1.753 m)        VITALS:  /70 (Site: Left Upper Arm, Position: Sitting, Cuff Size: Large Adult)   Pulse 92   Ht 5' 9\" (1.753 m)   Wt 208 lb (94.3 kg)   SpO2 97%   BMI 30.72 kg/m²   CONSTITUTIONAL: Cooperative, no apparent distress, and appears well nourished / developed  NEUROLOGIC:  Awake and orientated to person, place and time. PSYCH: Calm affect. SKIN: Warm and dry. HEENT: Sclera non-icteric, normocephalic, neck supple, no elevation of JVP, normal carotid pulses with no bruits and thyroid normal size. LUNGS:  No increased work of breathing and clear to auscultation, no crackles or wheezing  CARDIOVASCULAR:  Regular rate 94 and rhythm with no murmurs, gallops, rubs, or abnormal heart sounds, normal PMI. The apical impulses not displaced  JVP less than 8 cm H2O  Heart tones are crisp and normal  Cervical veins are not engorged  The carotid upstroke is normal in amplitude and contour without delay or bruit  JVP is not elevated  ABDOMEN:  Normal bowel sounds, non-distended and non-tender to palpation  EXT: No edema, no calf tenderness. Pulses are present bilaterally.     DATA:    Lab Results   Component Value Date    ALT 12 09/23/2020    AST 12 (L) 09/23/2020    ALKPHOS 82 09/23/2020    BILITOT 0.4 09/23/2020     Lab Results   Component Value Date    CREATININE 1.0 09/23/2020    BUN 16 09/23/2020     09/23/2020    K 3.6 09/23/2020    CL 99 09/23/2020    CO2 26 09/23/2020     Lab Results   Component Value Date    TSH 1.19 09/11/2019     Lab Results   Component Value Date    WBC 6.8 09/23/2020 HGB 15.8 09/23/2020    HCT 44.5 09/23/2020    MCV 87.8 09/23/2020     09/23/2020     No components found for: CHLPL  Lab Results   Component Value Date    TRIG 130 06/26/2020    TRIG 129 06/07/2017    TRIG 165 (H) 05/27/2017     Lab Results   Component Value Date    HDL 33 (L) 06/26/2020    HDL 28 (L) 06/07/2017    HDL 25 (L) 05/27/2017     Lab Results   Component Value Date    LDLCALC 59 06/26/2020    LDLCALC 72 06/07/2017    [address] 78 05/27/2017     Lab Results   Component Value Date    LABVLDL 26 06/26/2020    LABVLDL 26 06/07/2017    LABVLDL 33 05/27/2017     Radiology Review:  Pertinent images / reports were reviewed as a part of this visit and reveals the following:    Last Echo: Jun '17  Summary   Normal left ventricle size and systolic function with an estimated ejection   fraction of 60%. No regional wall motion abnormalities are seen. There is mild concentric left ventricular hypertrophy. E/e\"= 11.   Mild mitral regurgitation. A bubble study was performed and fails to show evidence of shunting. Trivial aortic regurgitation. Trivial tricuspid regurgitation with RVSP estimated at 33 mmHg. There is a trivial pericardial effusion noted. Stress Test: 9/11/20:  Summary    Normal myocardial perfusion study.    Normal LV size and systolic function. Assessment:      Diagnosis Orders   1. Coronary artery disease involving native coronary artery of native heart without angina pectoris   ~stable : offers no c/o angina  ~neg nuclear stress for ischemia Sept '20  ~hx of s/p CODEY to RCA, s/p CODEY to D-1 '13  ~ASA / statin / BB  ~RF: tobacco abuse ; limited exercise routine    2. Essential hypertension   ~controlled     3. Mixed hyperlipidemia   ~traditional statin / PCSK-9 / fish oil   ~HDL not at goal ; LDL well controlled       I had the opportunity to review the clinical symptoms and presentation of Ivis Castro. Plan:     1. Continue present management   2.  F/U in six months Overall the patient is stable from CV standpoint    I have addresed the patient's cardiac risk factors and adjusted pharmacologic treatment as needed. In addition, I have reinforced the need for patient directed risk factor modification. Further evaluation will be based upon the patient's clinical course and testing results. All questions and concerns were addressed to the patient. Alternatives to my treatment were discussed. The patient is currently smoking: ~ 1/2 ppd (less since last seen since he rolls his own and takes more effort). The risks related to smoking were reviewed with the patient. Recommend maintaining a smoke-free lifestyle. Products available for smoking cessation were discussed  ~he used to like collecting stickers from cigarette packs saving up for an iron lung ! Patient is on a beta-blocker  Patient is on an ace-i  Patient is on a statin    Antiplatelet therapy has been recommended / prescribed for this patient. Education conducted on adverse reactions including bleeding was discussed. The patient verbalizes understanding not to stop medications without discussing with us. Discussed exercise: 30-60 minutes 7 days/week : walks at times if around the gym  Discussed Low saturated fat diet. Thank you for allowing to us to participate in the care of Jacques Manriquez.     KEYUR Travis    Documentation of today's visit sent to PCP

## 2021-03-10 ENCOUNTER — TELEPHONE (OUTPATIENT)
Dept: PULMONOLOGY | Age: 71
End: 2021-03-10

## 2021-03-10 NOTE — TELEPHONE ENCOUNTER
Attempted to reach the patient for their appointment.      Left message on the patients home phone machine to complete or reschedule their appointment    Cell phone number has been disconnected

## 2021-03-12 DIAGNOSIS — G40.119 PARTIAL SYMPTOMATIC EPILEPSY WITH SIMPLE PARTIAL SEIZURES, INTRACTABLE, WITHOUT STATUS EPILEPTICUS (HCC): ICD-10-CM

## 2021-03-12 RX ORDER — DIVALPROEX SODIUM 500 MG/1
TABLET, EXTENDED RELEASE ORAL
Qty: 270 TABLET | Refills: 0 | Status: SHIPPED | OUTPATIENT
Start: 2021-03-12 | End: 2021-06-07 | Stop reason: SDUPTHER

## 2021-03-23 ENCOUNTER — TELEPHONE (OUTPATIENT)
Dept: PULMONOLOGY | Age: 71
End: 2021-03-23

## 2021-04-27 ENCOUNTER — OFFICE VISIT (OUTPATIENT)
Dept: PULMONOLOGY | Age: 71
End: 2021-04-27
Payer: MEDICARE

## 2021-04-27 VITALS
SYSTOLIC BLOOD PRESSURE: 130 MMHG | HEART RATE: 80 BPM | BODY MASS INDEX: 30.96 KG/M2 | DIASTOLIC BLOOD PRESSURE: 60 MMHG | WEIGHT: 209 LBS | HEIGHT: 69 IN

## 2021-04-27 DIAGNOSIS — I25.83 CORONARY ARTERY DISEASE DUE TO LIPID RICH PLAQUE: ICD-10-CM

## 2021-04-27 DIAGNOSIS — I10 ESSENTIAL HYPERTENSION: Chronic | ICD-10-CM

## 2021-04-27 DIAGNOSIS — I25.10 CORONARY ARTERY DISEASE DUE TO LIPID RICH PLAQUE: ICD-10-CM

## 2021-04-27 DIAGNOSIS — G47.33 OBSTRUCTIVE SLEEP APNEA: Primary | Chronic | ICD-10-CM

## 2021-04-27 PROCEDURE — 99213 OFFICE O/P EST LOW 20 MIN: CPT | Performed by: NURSE PRACTITIONER

## 2021-04-27 ASSESSMENT — SLEEP AND FATIGUE QUESTIONNAIRES
HOW LIKELY ARE YOU TO NOD OFF OR FALL ASLEEP IN A CAR, WHILE STOPPED FOR A FEW MINUTES IN TRAFFIC: 0
HOW LIKELY ARE YOU TO NOD OFF OR FALL ASLEEP WHILE LYING DOWN TO REST IN THE AFTERNOON WHEN CIRCUMSTANCES PERMIT: 3
HOW LIKELY ARE YOU TO NOD OFF OR FALL ASLEEP WHILE WATCHING TV: 0
HOW LIKELY ARE YOU TO NOD OFF OR FALL ASLEEP WHILE SITTING QUIETLY AFTER LUNCH WITHOUT ALCOHOL: 0
HOW LIKELY ARE YOU TO NOD OFF OR FALL ASLEEP WHILE SITTING AND TALKING TO SOMEONE: 0

## 2021-04-27 NOTE — PROGRESS NOTES
Diagnosis: [x] IFEANYI (G47.33) [] CSA (G47.31) [] Apnea (G47.30)   Length of Need: [x] 15 Months [] 99 Months [] Other:   Machine (JOSIE!): [] Respironics Dream Station      Auto [] ResMed AirSense     Auto [] Other:     []  CPAP () [] Bilevel ()   Mode: [] Auto [] Spontaneous    Mode: [] Auto [] Spontaneous                      Comfort Settings:      Humidifier: [] Heated ()        [x] Water chamber replacement ()/ 1 per 6 months        Mask:   [x] Nasal () /1 per 3 months [] Full Face () /1 per 3 months   [x] Patient choice -Size and fit mask [] Patient Choice - Size and fit mask   [] Dispense: [] Dispense:   [x] Headgear () / 1 per 3 months [] Headgear () / 1 per 3 months   [x] Replacement Nasal Cushion ()/2 per month [] Interface Replacement ()/1 per month   [x] Replacement Nasal Pillows ()/2 per month         Tubing: [x] Heated ()/1 per 3 months    [] Standard ()/1 per 3 months [] Other:           Filters: [x] Non-disposable ()/1 per 6 months     [x] Ultra-Fine, Disposable ()/2 per month        Miscellaneous: [] Chin Strap ()/ 1 per 6 months [] O2 bleed-in:        LPM   [] Oxymetry on CPAP/Bilevel []  Other:         Start Order Date: 04/27/21    MEDICAL JUSTIFICATION:  I, the undersigned, certify that the above prescribed supplies are medically necessary for this patients wellbeing. In my opinion, the supplies are both reasonable and necessary in reference to accepted standards of medicalpractice in treatment of this patients condition. Evangelina Sibley NP    NPI: [id number]       Order Signed Date: 04/27/21  [address] [facility]  Pulmonary, Sleep, and Critical Care    Pulmonary, Sleep, and Critical Care  [facility] [address] [place] [address]  [facility], [address] [facility] , [address] [facility]  Phone: [phone]    Fax: [phone] [place] [address] Harpreet Muscat  1950  [address]  [phone] (home)   There is no such number on file (mobile). Insurance Info (confirm with patient if correct):  Payor/Plan Subscr  Sex Relation Sub. Ins. ID Effective Group Num   1. Jono Skipper 1950 Male  [id number] Not Eff                                    [address] [address]   2.  Zigmund Expose 1950 Male  [id number] Not Eff                                    [address]

## 2021-04-27 NOTE — LETTER
[facility] Sleep [facility]  [address] [facility]  [place] [address] [address]  Phone: [phone]  Fax: [phone]    April 27, 2021       Patient: Mee Hendrix   MR Number: [id number]   YOB: 1950   Date of Visit: 4/27/2021       Zenobia Fleischer was seen for a follow up visit today. Here is my assessment and plan as well as an attached copy of his visit today:    Obstructive sleep apnea  Chronic-Stable but unsure if fully controlled: Reviewed and analyzed results of physiologic download from patient's machine and reviewed with patient. Supplies and parts as needed for his machine. These are medically necessary. Limit caffeine use after 3pm. Based on the analyzed data will continue with current settings. Need to take machine into his equipment company for evaluation and have a mask fitting. After leak is controlled will consider adjusting pressure if no improvement in his symptoms. Verbal and written instruction on PAP equipment cleaning and disinfection schedule provided. Follow up in 12 weeks. Essential hypertension  Chronic- Stable. Discussed the importance of treating obstructive sleep apnea as part of the management of this disorder. Cont any meds per PCP and other physicians. Coronary artery disease due to lipid rich plaque  Chronic- Stable. Discussed the importance of treating obstructive sleep apnea as part of the management of this disorder. Cont any meds per PCP and other physicians. Uncontrolled type 2 diabetes mellitus with complication, without long-term current use of insulin (HCC)  Chronic- Stable. Discussed the importance of treating obstructive sleep apnea as part of the management of this disorder. Cont any meds per PCP and other physicians. If you have questions or concerns, please do not hesitate to call me. I look forward to following Kane Aguirre along with you.     Sincerely,    Stephan Oh APRN    CC providers:  Flora Foote MD  [address] [facility] [address] [place] [address] [place] [address]  [address]

## 2021-04-27 NOTE — PROGRESS NOTES
Hola Ngo MD, Kimberly Ch[place], [facility]  Tiffanie Kehrt CNP Kathren Burr CNP [address]  Debbie Speaker [address] [facility], [address] [facility] [phone]   [facility] [address] Speaker. [address] [facility]. [address] [phone]     [place] [address] [phone]      Assessment/Plan:     1. Obstructive sleep apnea  Assessment & Plan:  Chronic-Stable but unsure if fully controlled: Reviewed and analyzed results of physiologic download from patient's machine and reviewed with patient. Supplies and parts as needed for his machine. These are medically necessary. Limit caffeine use after 3pm. Based on the analyzed data will continue with current settings. Need to take machine into his equipment company for evaluation and have a mask fitting. After leak is controlled will consider adjusting pressure if no improvement in his symptoms. Verbal and written instruction on PAP equipment cleaning and disinfection schedule provided. Follow up in 12 weeks. 2. Coronary artery disease due to lipid rich plaque  Assessment & Plan:  Chronic- Stable. Discussed the importance of treating obstructive sleep apnea as part of the management of this disorder. Cont any meds per PCP and other physicians. 3. Essential hypertension  Assessment & Plan:  Chronic- Stable. Discussed the importance of treating obstructive sleep apnea as part of the management of this disorder. Cont any meds per PCP and other physicians. 4. Uncontrolled type 2 diabetes mellitus with complication, without long-term current use of insulin (HCC)  Assessment & Plan:  Chronic- Stable. Discussed the importance of treating obstructive sleep apnea as part of the management of this disorder. Cont any meds per PCP and other physicians. Reviewed, analyzed, and documented physiologic data from patient's PAP machine.     This information was analyzed to assess complexity and Socioeconomic History    Marital status: Single     Spouse name: Not on file    Number of children: Not on file    Years of education: Not on file    Highest education level: Not on file   Occupational History    Not on file   Social Needs    Financial resource strain: Not on file    Food insecurity     Worry: Not on file     Inability: Not on file    Transportation needs     Medical: Not on file     Non-medical: Not on file   Tobacco Use    Smoking status: Current Some Day Smoker     Packs/day: 0.50     Years: 10.00     Pack years: 5.00     Types: Cigarettes     Last attempt to quit: 5/26/2017     Years since quitting: 3.9    Smokeless tobacco: Never Used   Substance and Sexual Activity    Alcohol use: No    Drug use: No    Sexual activity: Yes     Partners: Female   Lifestyle    Physical activity     Days per week: Not on file     Minutes per session: Not on file    Stress: Not on file   Relationships    Social connections     Talks on phone: Not on file     Gets together: Not on file     Attends Cheondoism service: Not on file     Active member of club or organization: Not on file     Attends meetings of clubs or organizations: Not on file     Relationship status: Not on file    Intimate partner violence     Fear of current or ex partner: Not on file     Emotionally abused: Not on file     Physically abused: Not on file     Forced sexual activity: Not on file   Other Topics Concern    Not on file   Social History Narrative    Not on file       Current Outpatient Medications   Medication Sig Dispense Refill    divalproex (DEPAKOTE ER) 500 MG extended release tablet TAKE 3 TABLETS BY MOUTH EVERY  tablet 0    atorvastatin (LIPITOR) 80 MG tablet TAKE 1 TABLET BY MOUTH EVERY DAY 30 tablet 1    METFORMIN HCL PO Take 1,000 mg by mouth 2 times daily       REPATHA SURECLICK 959 MG/ML SOAJ INJECT 1 PEN SC EVERY 2 WEEKS  11    VASCEPA 1 g CAPS capsule TAKE 2 CAPSULES BY MOUTH TWICE A DAY WITH FOOD  2    potassium chloride (KLOR-CON) 10 MEQ extended release tablet TAKE 1 TABLET BY MOUTH EVERY DAY  0    aspirin EC 81 MG EC tablet Take 1 tablet by mouth daily 90 tablet 3    hydrochlorothiazide (HYDRODIURIL) 25 MG tablet TAKE 1 TABLET BY MOUTH EVERY DAY  2    metoprolol tartrate (LOPRESSOR) 25 MG tablet TAKE 1 TABLET BY MOUTH 2 TIMES DAILY 180 tablet 1    omeprazole (PRILOSEC) 20 MG delayed release capsule Take 20 mg by mouth daily      lisinopril (PRINIVIL;ZESTRIL) 20 MG tablet TAKE 1 TABLET BY MOUTH DAILY 90 tablet 3    Blood Pressure Monitoring (BLOOD PRESSURE CUFF) MISC 1 Units by Does not apply route daily 1 each 0    FLUoxetine (PROZAC) 20 MG capsule Take 1 capsule by mouth daily 30 capsule 2    tamsulosin (FLOMAX) 0.4 MG capsule TAKE ONE CAPSULE BY MOUTH EVERY DAY IN THE EVENING 30 capsule 2    diphenoxylate-atropine (LOMOTIL) 2.5-0.025 MG per tablet 1 tid 90 tablet 0     No current facility-administered medications for this visit.         Allergies as of 04/27/2021 - Review Complete 04/27/2021   Allergen Reaction Noted    Penicillins Hives 12/01/2010    Benadryl [diphenhydramine hcl] Rash 12/01/2010    Erythromycin Rash 12/01/2010    Iodine Rash 12/01/2010       Patient Active Problem List   Diagnosis    Diabetes mellitus ([facility]    Essential hypertension    Anxiety    Irritable bowel syndrome    GERD (gastroesophageal reflux disease)    Eczema    DDD (degenerative disc disease), lumbosacral    Type II or unspecified type diabetes mellitus without mention of complication, not stated as uncontrolled    Neck pain    BPH (benign prostatic hyperplasia)    Acute MI, lateral wall, initial episode of care ([address]    Coronary artery disease due to lipid rich plaque    Myofascial pain syndrome    Olecranon bursitis    Hematoma    Elbow pain    Status post insertion of drug-eluting stent into right coronary artery for coronary artery disease    Obstructive sleep apnea    Depression  Eosinophilic granuloma (HCC)    Localized edema    Status post insertion of drug eluting coronary artery stent    Numbness of arm    TIA involving right internal carotid artery    Uncontrolled type 2 diabetes mellitus with complication, without long-term current use of insulin (HCC)    Dyslipidemia    Left sided numbness    Trigger finger of left thumb    Epilepsy (HCC)    Other malaise             Vitals:  Weight BMI   Wt Readings from Last 3 Encounters:   04/27/21 209 lb (94.8 kg)   12/16/20 208 lb (94.3 kg)   12/07/20 207 lb (93.9 kg)    Body mass index is 30.86 kg/m².      BP HR SaO2   BP Readings from Last 3 Encounters:   04/27/21 130/60   12/16/20 132/68   12/07/20 130/64    Pulse Readings from Last 3 Encounters:   04/27/21 80   12/16/20 92   12/07/20 71    SpO2 Readings from Last 3 Encounters:   12/16/20 97%   09/23/20 96%   06/24/20 96%            Electronically signed by KEYUR Wood on 4/27/2021 at 1:09 PM

## 2021-06-07 ENCOUNTER — OFFICE VISIT (OUTPATIENT)
Dept: NEUROLOGY | Age: 71
End: 2021-06-07
Payer: MEDICARE

## 2021-06-07 ENCOUNTER — HOSPITAL ENCOUNTER (OUTPATIENT)
Age: 71
Discharge: HOME | End: 2021-06-07
Payer: MEDICARE

## 2021-06-07 VITALS
BODY MASS INDEX: 30.96 KG/M2 | WEIGHT: 209 LBS | DIASTOLIC BLOOD PRESSURE: 79 MMHG | HEART RATE: 68 BPM | HEIGHT: 69 IN | SYSTOLIC BLOOD PRESSURE: 130 MMHG

## 2021-06-07 DIAGNOSIS — G40.109 PARTIAL SYMPTOMATIC EPILEPSY WITH SIMPLE PARTIAL SEIZURES, NOT INTRACTABLE, WITHOUT STATUS EPILEPTICUS (HCC): ICD-10-CM

## 2021-06-07 DIAGNOSIS — R25.1 TREMOR: ICD-10-CM

## 2021-06-07 DIAGNOSIS — T88.7XXA MEDICATION SIDE EFFECT: ICD-10-CM

## 2021-06-07 DIAGNOSIS — G40.109 PARTIAL SYMPTOMATIC EPILEPSY WITH SIMPLE PARTIAL SEIZURES, NOT INTRACTABLE, WITHOUT STATUS EPILEPTICUS (HCC): Primary | ICD-10-CM

## 2021-06-07 LAB
ALT SERPL-CCNC: 16 U/L (ref 10–40)
AST SERPL-CCNC: 18 U/L (ref 15–37)
BASOPHILS ABSOLUTE: 0 K/UL (ref 0–0.2)
BASOPHILS RELATIVE PERCENT: 0.6 %
EOSINOPHILS ABSOLUTE: 0.2 K/UL (ref 0–0.6)
EOSINOPHILS RELATIVE PERCENT: 3.5 %
HCT VFR BLD CALC: 43.7 % (ref 40.5–52.5)
HEMOGLOBIN: 15.1 G/DL (ref 13.5–17.5)
LYMPHOCYTES ABSOLUTE: 2.6 K/UL (ref 1–5.1)
LYMPHOCYTES RELATIVE PERCENT: 37.4 %
MCH RBC QN AUTO: 30.7 PG (ref 26–34)
MCHC RBC AUTO-ENTMCNC: 34.6 G/DL (ref 31–36)
MCV RBC AUTO: 88.5 FL (ref 80–100)
MONOCYTES ABSOLUTE: 0.5 K/UL (ref 0–1.3)
MONOCYTES RELATIVE PERCENT: 7.9 %
NEUTROPHILS ABSOLUTE: 3.5 K/UL (ref 1.7–7.7)
NEUTROPHILS RELATIVE PERCENT: 50.6 %
PDW BLD-RTO: 13.9 % (ref 12.4–15.4)
PLATELET # BLD: 144 K/UL (ref 135–450)
PMV BLD AUTO: 9.8 FL (ref 5–10.5)
RBC # BLD: 4.94 M/UL (ref 4.2–5.9)
VALPROIC ACID LEVEL: 67.1 UG/ML (ref 50–100)
WBC # BLD: 6.9 K/UL (ref 4–11)

## 2021-06-07 PROCEDURE — 36415 COLL VENOUS BLD VENIPUNCTURE: CPT

## 2021-06-07 PROCEDURE — 84450 TRANSFERASE (AST) (SGOT): CPT

## 2021-06-07 PROCEDURE — 99214 OFFICE O/P EST MOD 30 MIN: CPT | Performed by: PSYCHIATRY & NEUROLOGY

## 2021-06-07 PROCEDURE — 80164 ASSAY DIPROPYLACETIC ACD TOT: CPT

## 2021-06-07 PROCEDURE — 84460 ALANINE AMINO (ALT) (SGPT): CPT

## 2021-06-07 PROCEDURE — 85025 COMPLETE CBC W/AUTO DIFF WBC: CPT

## 2021-06-07 RX ORDER — DIVALPROEX SODIUM 500 MG/1
TABLET, EXTENDED RELEASE ORAL
Qty: 270 TABLET | Refills: 1 | Status: SHIPPED | OUTPATIENT
Start: 2021-06-07 | End: 2021-12-02

## 2021-06-07 NOTE — PROGRESS NOTES
Bridger Harry   Neurology followup    Subjective:   CC/HP  History was obtained from the patient. Patient is partial complex seizures. He is doing well without any further seizures. He denies any other neurological symptoms. No side effects to medication  New symptom: Patient complains of some pain and tenderness over the skin in the right upper quadrant of his abdomen. He was seen by his primary care doctor who did not find any rash. He was told to put on some cream but patient states it still somewhat tender and painful. He has also noticed tremors in his right hand since he went back on the Depakote.     REVIEW OF SYSTEMS    Constitutional:  [x]   Chills   []  Fatigue   []  Fevers   []  Malaise   [x]  Weight loss     [] Denies all of the above    Respiratory:   [x]  Cough    []  Shortness of breath         [] Denies all of the above     Cardiovascular:   []  Chest pain    []  Exertional chest pressure/discomfort           [] Palpitations    []  Syncope     [x] Denies all of the above        Past Medical History:   Diagnosis Date    Anxiety     BPH (benign prostatic hyperplasia)     CAD (coronary artery disease) 10/25/2013    s/p angioplsty 2    Chronic back pain oct 2008    in [place] [address]    DDD (degenerative disc disease), lumbosacral 1/7/2013    Depression     Eosinophilic granuloma (HCC)     sp biopsy 10 years by Dr Eric Fong Epilepsy [facility])     Hyperlipidemia     Hypertension     Irritable bowel syndrome     Myofascial pain syndrome 1/7/2013    Obstructive sleep apnea (adult) (pediatric) 10/9/2014    Type II or unspecified type diabetes mellitus without mention of complication, not stated as uncontrolled      Family History   Problem Relation Age of Onset    High Blood Pressure Mother     Diabetes Father     Heart Disease Father     High Blood Pressure Father      Social History     Socioeconomic History    Marital status: Single     Spouse name: Not on file    Number of children: Not stiffness. Data :  LABS:  General Labs:    CBC:   Lab Results   Component Value Date    WBC 6.8 09/23/2020    RBC 5.07 09/23/2020    HGB 15.8 09/23/2020    HCT 44.5 09/23/2020    MCV 87.8 09/23/2020    MCH 31.1 09/23/2020    MCHC 35.4 09/23/2020    RDW 13.4 09/23/2020     09/23/2020    MPV 8.7 09/23/2020     BMP:    Lab Results   Component Value Date     09/23/2020    K 3.6 09/23/2020    CL 99 09/23/2020    CO2 26 09/23/2020    BUN 16 09/23/2020    LABALBU 4.4 09/23/2020    CREATININE 1.0 09/23/2020    CALCIUM 9.8 09/23/2020    GFRAA >60 09/23/2020    GFRAA >60 04/12/2013    LABGLOM >60 09/23/2020    GLUCOSE 226 09/23/2020     RADIOLOGY REVIEW:  I have reviewed radiology report(s) of: MRI brain    Impression :  Partial simple seizures  Patient has longstanding thrombocytopenia  MRI brain normal  The right hand rest tremor has now come back after he started taking the Depakote again. There is no cogwheel rigidity and no other evidence of Parkinson's disease. There is no rash on his upper quadrant of the abdomen. Plan :  Refill Depakote  mg 3 tablets daily  I will go ahead and get a Depakote level CBC and liver enzymes. Return in 6 months          Please note a portion of  this chart was generated using dragon dictation software. Although every effort was made to ensure the accuracy of this automated transcription, some errors in transcription may have occurred. Pursuant to the emergency declaration under the [facility], 1135 waiver authority and the [employer] Act, this Virtual  Visit was conducted, with patient's consent, to reduce the patient's risk of exposure to COVID-19 and provide continuity of care for an established patient.

## 2021-06-07 NOTE — LETTER
[facility]  [address] [place] [id number]  Phone: [phone]  Fax: [phone]    Lory Watts MD    June 7, 2021     Geeta Moran, [address] [address]    Patient: Vahid Anne   MR Number: [id number]   YOB: 1950   Date of Visit: 6/7/2021       Dear Geeta Moran: Thank you for referring Pipe Landis to me for evaluation/treatment. Below are the relevant portions of my assessment and plan of care. If you have questions, please do not hesitate to call me. I look forward to following Neeraj Madrid along with you.     Sincerely,    MD Lory Haque MD

## 2021-06-14 ENCOUNTER — OFFICE VISIT (OUTPATIENT)
Dept: CARDIOLOGY CLINIC | Age: 71
End: 2021-06-14
Payer: MEDICARE

## 2021-06-14 VITALS
WEIGHT: 211.8 LBS | DIASTOLIC BLOOD PRESSURE: 84 MMHG | OXYGEN SATURATION: 95 % | BODY MASS INDEX: 31.37 KG/M2 | SYSTOLIC BLOOD PRESSURE: 130 MMHG | HEIGHT: 69 IN | HEART RATE: 72 BPM

## 2021-06-14 DIAGNOSIS — E78.2 MIXED HYPERLIPIDEMIA: ICD-10-CM

## 2021-06-14 DIAGNOSIS — I25.10 CORONARY ARTERY DISEASE INVOLVING NATIVE CORONARY ARTERY OF NATIVE HEART WITHOUT ANGINA PECTORIS: Primary | ICD-10-CM

## 2021-06-14 DIAGNOSIS — I10 ESSENTIAL HYPERTENSION: ICD-10-CM

## 2021-06-14 PROCEDURE — 99214 OFFICE O/P EST MOD 30 MIN: CPT | Performed by: NURSE PRACTITIONER

## 2021-06-14 RX ORDER — TRIAMCINOLONE ACETONIDE 1 MG/G
CREAM TOPICAL
COMMUNITY
Start: 2021-05-20

## 2021-06-14 NOTE — PROGRESS NOTES
[address]     Outpatient Follow Up Note    Chastity Mccabe is 72 y.o. male who presents today with a history of CAD s/p PTCA diag-1 & RCA '14, HTN and hyperlipidemia. He's in the [employer] vaccination study : pd for the first year; voluntary the 2nd year. CHIEF COMPLAINT / HPI:  Follow Up secondary to coronary artery disease    Subjective:     He denies significant chest pain. There is no SOB/UP. The patient denies orthopnea/PND. He's using his CPAP but having trouble with it leaking a little air. The patient does not have swelling. The patients weight is stable . The patient is not experiencing palpitations or dizziness. He has abd discomfort in his Rt belly. Under the skin is sore. He's using a topical cream yet not helping. He's felt a bump under his left axilla. It was very sore a couple of days ago. Its not draining. These symptoms show no change since the last OV. With regard to medication therapy the patient has been compliant with prescribed regimen. They have tolerated therapy to date.      Past Medical History:   Diagnosis Date    Anxiety     BPH (benign prostatic hyperplasia)     CAD (coronary artery disease) 10/25/2013    s/p angioplsty 2    Chronic back pain oct 2008    in [place] [address]    DDD (degenerative disc disease), lumbosacral 2013    Depression     Eosinophilic granuloma (HCC)     sp biopsy 10 years by Dr Selena Morgan Epilepsy [facility])     Hyperlipidemia     Hypertension     Irritable bowel syndrome     Myofascial pain syndrome 2013    Obstructive sleep apnea (adult) (pediatric) 10/9/2014    Type II or unspecified type diabetes mellitus without mention of complication, not stated as uncontrolled      Social History:    Social History     Tobacco Use   Smoking Status Current Some Day Smoker    Packs/day: 0.50    Years: 10.00    Pack years: 5.00    Types: Cigarettes    Last attempt to quit: 2017    Years since quittin.0   Smokeless Tobacco Never Used Current Medications:  Current Outpatient Medications   Medication Sig Dispense Refill    divalproex (DEPAKOTE ER) 500 MG extended release tablet TAKE 3 TABLETS BY MOUTH EVERY  tablet 1    atorvastatin (LIPITOR) 80 MG tablet TAKE 1 TABLET BY MOUTH EVERY DAY 30 tablet 1    METFORMIN HCL PO Take 1,000 mg by mouth 2 times daily       REPATHA SURECLICK 793 MG/ML SOAJ INJECT 1 PEN SC EVERY 2 WEEKS  11    VASCEPA 1 g CAPS capsule TAKE 2 CAPSULES BY MOUTH TWICE A DAY WITH FOOD  2    potassium chloride (KLOR-CON) 10 MEQ extended release tablet TAKE 1 TABLET BY MOUTH EVERY DAY  0    aspirin EC 81 MG EC tablet Take 1 tablet by mouth daily 90 tablet 3    hydrochlorothiazide (HYDRODIURIL) 25 MG tablet TAKE 1 TABLET BY MOUTH EVERY DAY  2    metoprolol tartrate (LOPRESSOR) 25 MG tablet TAKE 1 TABLET BY MOUTH 2 TIMES DAILY 180 tablet 1    omeprazole (PRILOSEC) 20 MG delayed release capsule Take 20 mg by mouth daily      lisinopril (PRINIVIL;ZESTRIL) 20 MG tablet TAKE 1 TABLET BY MOUTH DAILY 90 tablet 3    Blood Pressure Monitoring (BLOOD PRESSURE CUFF) MISC 1 Units by Does not apply route daily 1 each 0    FLUoxetine (PROZAC) 20 MG capsule Take 1 capsule by mouth daily 30 capsule 2    tamsulosin (FLOMAX) 0.4 MG capsule TAKE ONE CAPSULE BY MOUTH EVERY DAY IN THE EVENING 30 capsule 2    diphenoxylate-atropine (LOMOTIL) 2.5-0.025 MG per tablet 1 tid 90 tablet 0     No current facility-administered medications for this visit. REVIEW OF SYSTEMS:    CONSTITUTIONAL: No major weight gain or loss, fatigue, weakness, night sweats or fever. HEENT: No new vision difficulties or ringing in the ears. RESPIRATORY: No new SOB, PND, orthopnea or cough. CARDIOVASCULAR: See HPI  GI: No nausea, vomiting, diarrhea, constipation, abdominal pain or changes in bowel habits. : No urinary frequency, urgency, incontinence hematuria or dysuria. SKIN: No cyanosis or skin lesions.   MUSCULOSKELETAL: No new muscle or joint pain.  NEUROLOGICAL: No syncope or TIA-like symptoms. PSYCHIATRIC: No anxiety, pain, insomnia or depression    Objective:   PHYSICAL EXAM:        Vitals:    06/14/21 0926 06/14/21 0950   BP: 108/60 130/84   Site: Right Upper Arm Left Upper Arm   Position: Sitting    Cuff Size: Large Adult Large Adult   Pulse: 72    SpO2: 95%    Weight: 211 lb 12.8 oz (96.1 kg)    Height: 5' 9\" (1.753 m)        VITALS:  /60 (Site: Right Upper Arm, Position: Sitting, Cuff Size: Large Adult)   Pulse 72   Ht 5' 9\" (1.753 m)   Wt 211 lb 12.8 oz (96.1 kg)   SpO2 95%   BMI 31.28 kg/m²   CONSTITUTIONAL: Cooperative, no apparent distress, and appears well nourished / developed  NEUROLOGIC:  Awake and orientated to person, place and time. PSYCH: Calm affect. SKIN: Warm and dry. HEENT: Sclera non-icteric, normocephalic, neck supple, no elevation of JVP, normal carotid pulses with no bruits and thyroid normal size. LUNGS:  No increased work of breathing and clear to auscultation, no crackles or wheezing  CARDIOVASCULAR:  Regular rate 76 and rhythm with no murmurs, gallops, rubs, or abnormal heart sounds, normal PMI. The apical impulses not displaced  JVP less than 8 cm H2O  Heart tones are crisp and normal  Cervical veins are not engorged  The carotid upstroke is normal in amplitude and contour without delay or bruit  JVP is not elevated  ABDOMEN:  Normal bowel sounds, non-distended and non-tender to palpation  EXT: No edema, no calf tenderness. Pulses are present bilaterally.     DATA:    Lab Results   Component Value Date    ALT 16 06/07/2021    AST 18 06/07/2021    ALKPHOS 82 09/23/2020    BILITOT 0.4 09/23/2020     Lab Results   Component Value Date    CREATININE 1.0 09/23/2020    BUN 16 09/23/2020     09/23/2020    K 3.6 09/23/2020    CL 99 09/23/2020    CO2 26 09/23/2020     Lab Results   Component Value Date    TSH 1.19 09/11/2019     Lab Results   Component Value Date    WBC 6.9 06/07/2021    HGB 15.1 06/07/2021 HCT 43.7 06/07/2021    MCV 88.5 06/07/2021     06/07/2021     No components found for: CHLPL  Lab Results   Component Value Date    TRIG 130 06/26/2020    TRIG 129 06/07/2017    TRIG 165 (H) 05/27/2017     Lab Results   Component Value Date    HDL 33 (L) 06/26/2020    HDL 28 (L) 06/07/2017    HDL 25 (L) 05/27/2017     Lab Results   Component Value Date    LDLCALC 59 06/26/2020    LDLCALC 72 06/07/2017    [address] 05/27/2017     Lab Results   Component Value Date    LABVLDL 26 06/26/2020    LABVLDL 26 06/07/2017    LABVLDL 33 05/27/2017     Radiology Review:  Pertinent images / reports were reviewed as a part of this visit and reveals the following:    Last Echo: Jun '17  Summary   Normal left ventricle size and systolic function with an estimated ejection   fraction of 60%. No regional wall motion abnormalities are seen. There is mild concentric left ventricular hypertrophy. E/e\"= 11.   Mild mitral regurgitation. A bubble study was performed and fails to show evidence of shunting. Trivial aortic regurgitation. Trivial tricuspid regurgitation with RVSP estimated at 33 mmHg. There is a trivial pericardial effusion noted. Stress Test: 9/11/20:  Summary    Normal myocardial perfusion study.    Normal LV size and systolic function. Assessment:      Diagnosis Orders   1. Coronary artery disease involving native coronary artery of native heart without angina pectoris   ~stable : denies angina   ~neg nuclear stress for ischemia Sept '20  ~hx of s/p CODEY to RCA, s/p CODEY to D-1 '13  ~ASA / statin / BB  ~RF: tobacco abuse with amt smoked increased since last appt ; limited exercise routine attributed to sore knees    2. Essential hypertension   ~controlled (doubt accuracy of BP taken on arrival)     3.  Mixed hyperlipidemia   ~traditional statin / PCSK-9 / fish oil >> has been without Repatha for 1-2 weeks  ~HDL not at goal ; LDL well controlled on profile from June '20      I had the opportunity to review the clinical symptoms and presentation of Tab Goncalves. Plan:     1. Fasting lipid profile / CMP in am then inst to resume Repatha   2. F/U in six months    Overall the patient is stable from CV standpoint    I have addresed the patient's cardiac risk factors and adjusted pharmacologic treatment as needed. In addition, I have reinforced the need for patient directed risk factor modification. Further evaluation will be based upon the patient's clinical course and testing results. All questions and concerns were addressed to the patient. Alternatives to my treatment were discussed. 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 The risks related to smoking were reviewed with the patient. Recommend maintaining a smoke-free lifestyle. Products available for smoking cessation were discussed    Patient is on a beta-blocker  Patient is on an ace-i  Patient is on a statin    Antiplatelet therapy has been recommended / prescribed for this patient. Education conducted on adverse reactions including bleeding was discussed. The patient verbalizes understanding not to stop medications without discussing with us. Discussed exercise: 30-60 minutes 7 days/week : having trouble with his knees  Discussed Low saturated fat diet. Thank you for allowing to us to participate in the care of Tab Goncalves.     KEYUR Huynh    Documentation of today's visit sent to PCP

## 2021-06-14 NOTE — PATIENT INSTRUCTIONS
Fasting cholesterol levels as routine     Resume taking Repatha after your blood work    appt in six months

## 2021-06-15 ENCOUNTER — HOSPITAL ENCOUNTER (OUTPATIENT)
Age: 71
Discharge: HOME | End: 2021-06-15
Payer: MEDICARE

## 2021-06-15 DIAGNOSIS — E78.2 MIXED HYPERLIPIDEMIA: ICD-10-CM

## 2021-06-15 LAB
A/G RATIO: 1.6 (ref 1.1–2.2)
ALBUMIN SERPL-MCNC: 4 G/DL (ref 3.4–5)
ALP BLD-CCNC: 63 U/L (ref 40–129)
ALT SERPL-CCNC: 11 U/L (ref 10–40)
ANION GAP SERPL CALCULATED.3IONS-SCNC: 13 MMOL/L (ref 3–16)
AST SERPL-CCNC: 13 U/L (ref 15–37)
BILIRUB SERPL-MCNC: 0.4 MG/DL (ref 0–1)
BUN BLDV-MCNC: 18 MG/DL (ref 7–20)
CALCIUM SERPL-MCNC: 8.8 MG/DL (ref 8.3–10.6)
CHLORIDE BLD-SCNC: 100 MMOL/L (ref 99–110)
CHOLESTEROL, FASTING: 122 MG/DL (ref 0–199)
CO2: 27 MMOL/L (ref 21–32)
CREAT SERPL-MCNC: 1 MG/DL (ref 0.8–1.3)
GFR AFRICAN AMERICAN: >60
GFR NON-AFRICAN AMERICAN: >60
GLOBULIN: 2.5 G/DL
GLUCOSE BLD-MCNC: 111 MG/DL (ref 70–99)
HDLC SERPL-MCNC: 29 MG/DL (ref 40–60)
LDL CHOLESTEROL CALCULATED: 59 MG/DL
POTASSIUM SERPL-SCNC: 4.3 MMOL/L (ref 3.5–5.1)
SODIUM BLD-SCNC: 140 MMOL/L (ref 136–145)
TOTAL PROTEIN: 6.5 G/DL (ref 6.4–8.2)
TRIGLYCERIDE, FASTING: 171 MG/DL (ref 0–150)
VLDLC SERPL CALC-MCNC: 34 MG/DL

## 2021-06-15 PROCEDURE — 80061 LIPID PANEL: CPT

## 2021-06-15 PROCEDURE — 36415 COLL VENOUS BLD VENIPUNCTURE: CPT

## 2021-06-15 PROCEDURE — 80053 COMPREHEN METABOLIC PANEL: CPT

## 2021-06-16 ENCOUNTER — TELEPHONE (OUTPATIENT)
Dept: CARDIOLOGY CLINIC | Age: 71
End: 2021-06-16

## 2021-06-16 NOTE — TELEPHONE ENCOUNTER
Emelie Duverney, [place]   6/16/2021  9:44 AM EDT Back to Top      Called patient with results he has been off his vascepa Mahamed Mac, APRN - CNP   6/15/2021  2:53 PM EDT       Trig up ; HDL low : inst on diet .  Walking helps with improving HDL
Unresponsive

## 2021-07-20 ENCOUNTER — OFFICE VISIT (OUTPATIENT)
Dept: PULMONOLOGY | Age: 71
End: 2021-07-20
Payer: MEDICARE

## 2021-07-20 VITALS — HEIGHT: 69 IN | WEIGHT: 213.6 LBS | BODY MASS INDEX: 31.64 KG/M2 | HEART RATE: 76 BPM | OXYGEN SATURATION: 95 %

## 2021-07-20 DIAGNOSIS — E66.9 OBESITY (BMI 30.0-34.9): ICD-10-CM

## 2021-07-20 DIAGNOSIS — I25.10 CORONARY ARTERY DISEASE DUE TO LIPID RICH PLAQUE: ICD-10-CM

## 2021-07-20 DIAGNOSIS — I25.83 CORONARY ARTERY DISEASE DUE TO LIPID RICH PLAQUE: ICD-10-CM

## 2021-07-20 DIAGNOSIS — I10 ESSENTIAL HYPERTENSION: Chronic | ICD-10-CM

## 2021-07-20 DIAGNOSIS — K21.00 GASTROESOPHAGEAL REFLUX DISEASE WITH ESOPHAGITIS WITHOUT HEMORRHAGE: ICD-10-CM

## 2021-07-20 DIAGNOSIS — G47.33 OBSTRUCTIVE SLEEP APNEA: Primary | Chronic | ICD-10-CM

## 2021-07-20 PROBLEM — E66.811 OBESITY (BMI 30.0-34.9): Status: ACTIVE | Noted: 2021-07-20

## 2021-07-20 PROCEDURE — 99214 OFFICE O/P EST MOD 30 MIN: CPT | Performed by: NURSE PRACTITIONER

## 2021-07-20 ASSESSMENT — SLEEP AND FATIGUE QUESTIONNAIRES
HOW LIKELY ARE YOU TO NOD OFF OR FALL ASLEEP WHILE SITTING AND TALKING TO SOMEONE: 0
HOW LIKELY ARE YOU TO NOD OFF OR FALL ASLEEP WHILE SITTING INACTIVE IN A PUBLIC PLACE: 0
HOW LIKELY ARE YOU TO NOD OFF OR FALL ASLEEP WHILE SITTING AND READING: 1
ESS TOTAL SCORE: 4
HOW LIKELY ARE YOU TO NOD OFF OR FALL ASLEEP WHEN YOU ARE A PASSENGER IN A CAR FOR AN HOUR WITHOUT A BREAK: 0
HOW LIKELY ARE YOU TO NOD OFF OR FALL ASLEEP IN A CAR, WHILE STOPPED FOR A FEW MINUTES IN TRAFFIC: 0
HOW LIKELY ARE YOU TO NOD OFF OR FALL ASLEEP WHILE LYING DOWN TO REST IN THE AFTERNOON WHEN CIRCUMSTANCES PERMIT: 2
HOW LIKELY ARE YOU TO NOD OFF OR FALL ASLEEP WHILE WATCHING TV: 0
HOW LIKELY ARE YOU TO NOD OFF OR FALL ASLEEP WHILE SITTING QUIETLY AFTER LUNCH WITHOUT ALCOHOL: 1

## 2021-07-20 NOTE — PROGRESS NOTES
Kizzy Motta MD, Irene Tao, [facility]  Tiffanie Kehrt CNP  Saqib Yehtt CNP [address]  [address] [facility], [address] [facility] [phone]   [facility] [address]. [address] [facility]. [address] [phone]     [place] [id number] [phone]      Assessment/Plan:     1. Obstructive sleep apnea  Assessment & Plan:  Chronic-Stable: Reviewed and analyzed results of physiologic download from patient's machine and reviewed with patient. Supplies and parts as needed for his machine. These are medically necessary. Limit caffeine use after 3pm. Based on the analyzed data will continue with current settings. Patient to schedule an appointment with his equipment company for a mask fitting. Discussed sleep hygiene and napping during the day. Verbal and written instruction on PAP equipment cleaning and disinfection schedule provided. Follow up in 3 months. 2. Coronary artery disease due to lipid rich plaque  Assessment & Plan:  Chronic- Stable. Discussed the importance of treating obstructive sleep apnea as part of the management of this disorder. Cont any meds per PCP and other physicians. 3. Essential hypertension  Assessment & Plan:  Chronic- Stable. Discussed the importance of treating obstructive sleep apnea as part of the management of this disorder. Cont any meds per PCP and other physicians. 4. Uncontrolled type 2 diabetes mellitus with complication, without long-term current use of insulin (HCC)  Assessment & Plan:  Chronic- Stable. Discussed the importance of treating obstructive sleep apnea as part of the management of this disorder. Cont any meds per PCP and other physicians. 5. Gastroesophageal reflux disease with esophagitis without hemorrhage  Assessment & Plan:  Chronic- Stable. Discussed the importance of treating obstructive sleep apnea as part of the management of this disorder.   Cont any meds per PCP and other physicians. 6. Obesity (BMI 30.0-34. 9)  Assessment & Plan:  Chronic-not stable:  Discussed importance of treating obstructive sleep apnea and getting sufficient sleep to assist with weight control. Encouraged him to work on weight loss through diet and exercise. Recommended DASH or Mediterranean diets. Reviewed, analyzed, and documented physiologic data from patient's PAP machine. This information was analyzed to assess complexity and medical decision making in regards to further testing and management. The primary encounter diagnosis was Obstructive sleep apnea. Diagnoses of Coronary artery disease due to lipid rich plaque, Essential hypertension, Uncontrolled type 2 diabetes mellitus with complication, without long-term current use of insulin ([address] Gastroesophageal reflux disease with esophagitis without hemorrhage, and Obesity (BMI 30.0-34.9) were also pertinent to this visit. The chronic medical conditions listed are directly related to the primary diagnosis listed above. The management of the primary diagnosis affects the secondary diagnosis and vice versa. Subjective:   Subjective   Patient ID: Hill Taylor is a 72 y.o. male. Chief Complaint   Patient presents with    Sleep Apnea     having trouble with his mask, needs to reorder supplies. he has trouble washing his non-disposable filter b/c no running hot water. HPI:  Machine Modem/Download Info:  Compliance (hours/night): 8.96 hrs/night  % of nights >= 4 hrs: 96.4 %  Download AHI (/hour): 1.5 /HR  Average CPAP Pressure : 12.5 cmH2O      APAP - Settings  Pressure Min: 11 cmH2O  Pressure Max: 20 cmH2O                 Comfort Settings  Humidity Level (0-8): 0  Heated Tubing (Yes/No): Yes  Flex/EPR (0-3): 3 PAP Mask  Clinically Relevant Leak: Yes     Hill Taylor is doing well with his machine.  Machine download still shows high mask leak of just under 3 hours each night which is worse than his last visit. He reports he went to his equipment company and had a mask fitting and was told the mask  leak was from the oil on his face. He has been using the same mask since he received the machine in January. He does not clean his supplies often. He does not have hot running water at this time which makes it difficult for him. Pressure feels good. He reports he is not sleeping well at night but naps throughout the day due to boredom which affects his sleep at night. he denies headaches, congestion, nosebleeds, dryness, aerophagia, or drowsiness while driving. Discussed the recall of [employer] devices with patient and the severity of his sleep apnea. Discussed the risks of untreated sleep apnea including but not limited to car accidents, heart attacks, strokes, and death. Alternative therapy may not exist or may be severely limited. Felt the benefits of continued usage of these devices may outweigh the risks identified in the recall notification. Advised to avoid use of unproven cleaning methods, such as ozone. Due to the unknown variables each patient will have to make a determination in his/her own. Please contact your equipment company for further instruction until an alternative is found. Encouraged patient to register his machine for the recall and provided phone number. [facility] [employer] [facility]    [place] - Total score: 4    Social History     Socioeconomic History    Marital status: Single     Spouse name: Not on file    Number of children: Not on file    Years of education: Not on file    Highest education level: Not on file   Occupational History    Not on file   Tobacco Use    Smoking status: Current Some Day Smoker     Packs/day: 0.50     Years: 10.00     Pack years: 5.00     Types: Cigarettes     Last attempt to quit: 2017     Years since quittin.1    Smokeless tobacco: Never Used   Vaping Use    Vaping Use: Never used   Substance and Sexual Activity    Alcohol use: No    Drug use:  No  Sexual activity: Yes     Partners: Female   Other Topics Concern    Not on file   Social History Narrative    Not on file     Social Determinants of Health     Financial Resource Strain:     Difficulty of Paying Living Expenses:    Food Insecurity:     Worried About Running Out of Food in the Last Year:     [address] in the Last Year:    Transportation Needs:     Lack of Transportation (Medical):      Lack of Transportation (Non-Medical):    Physical Activity:     Days of Exercise per Week:     Minutes of Exercise per Session:    Stress:     Feeling of Stress :    Social Connections:     Frequency of Communication with Friends and Family:     Frequency of Social Gatherings with Friends and Family:     Attends [employer] Services:     Active Member of Clubs or Organizations:     Attends Club or Organization Meetings:     Marital Status:    Intimate Partner Violence:     Fear of Current or Ex-Partner:     Emotionally Abused:     Physically Abused:     Sexually Abused:        Current Outpatient Medications   Medication Instructions    aspirin EC 81 mg, Oral, DAILY    atorvastatin (LIPITOR) 80 MG tablet TAKE 1 TABLET BY MOUTH EVERY DAY    Blood Pressure Monitoring (BLOOD PRESSURE CUFF) MISC 1 Units, Does not apply, DAILY    CPAP Machine MISC Does not apply    diphenoxylate-atropine (LOMOTIL) 2.5-0.025 MG per tablet 1 tid    divalproex (DEPAKOTE ER) 500 MG extended release tablet TAKE 3 TABLETS BY MOUTH EVERY DAY    FLUoxetine (PROZAC) 20 mg, Oral, DAILY    hydrochlorothiazide (HYDRODIURIL) 25 MG tablet TAKE 1 TABLET BY MOUTH EVERY DAY    lisinopril (PRINIVIL;ZESTRIL) 20 MG tablet TAKE 1 TABLET BY MOUTH DAILY    METFORMIN HCL PO 1,000 mg, Oral, 2 TIMES DAILY    metoprolol tartrate (LOPRESSOR) 25 MG tablet TAKE 1 TABLET BY MOUTH 2 TIMES DAILY    omeprazole (PRILOSEC) 20 mg, Oral, DAILY    potassium chloride (KLOR-CON) 10 MEQ extended release tablet TAKE 1 TABLET BY MOUTH EVERY DAY  REPATHA SURECLICK 211 MG/ML SOKARENAJ INJECT 1 PEN SC EVERY 2 WEEKS    tamsulosin (FLOMAX) 0.4 MG capsule TAKE ONE CAPSULE BY MOUTH EVERY DAY IN THE EVENING    triamcinolone (KENALOG) 0.1 % cream No dose, route, or frequency recorded.  VASCEPA 1 g CAPS capsule TAKE 2 CAPSULES BY MOUTH TWICE A DAY WITH FOOD          Vitals:  Weight BMI   Wt Readings from Last 3 Encounters:   07/20/21 213 lb 9.6 oz (96.9 kg)   06/14/21 211 lb 12.8 oz (96.1 kg)   06/07/21 209 lb (94.8 kg)    Body mass index is 31.54 kg/m².      BP HR SaO2   BP Readings from Last 3 Encounters:   06/14/21 130/84   06/07/21 130/79   04/27/21 130/60    Pulse Readings from Last 3 Encounters:   07/20/21 76   06/14/21 72   06/07/21 68    SpO2 Readings from Last 3 Encounters:   07/20/21 95%   06/14/21 95%   12/16/20 97%        Electronically signed by KEYUR Hirsch on 7/20/2021 at 2:11 PM

## 2021-07-20 NOTE — PROGRESS NOTES
Diagnosis: [x] IFEANYI (G47.33) [] CSA (G47.31) [] Apnea (G47.30)   Length of Need: [x] 15 Months [] 99 Months [] Other:   Machine (JOSIE!): [] Respironics Dream Station      Auto [] ResMed AirSense     Auto [] Other:     []  CPAP () [] Bilevel ()   Mode: [] Auto [] Spontaneous    Mode: [] Auto [] Spontaneous                        Comfort Settings:      Humidifier: [] Heated ()        [x] Water chamber replacement ()/ 1 per 6 months        Mask:   [] Nasal () /1 per 3 months [x] Full Face () /1 per 3 months   [] Patient choice -Size and fit mask [x] Patient Choice - Size and fit mask   [] Dispense: [] Dispense:   [] Headgear () / 1 per 3 months [x] Headgear () / 1 per 3 months   [] Replacement Nasal Cushion ()/2 per month [x] Interface Replacement ()/1 per month   [] Replacement Nasal Pillows ()/2 per month         Tubing: [x] Heated ()/1 per 3 months    [] Standard ()/1 per 3 months [] Other:           Filters: [x] Non-disposable ()/1 per 6 months     [x] Ultra-Fine, Disposable ()/2 per month        Miscellaneous: [] Chin Strap ()/ 1 per 6 months [] O2 bleed-in:        LPM   [] Oxymetry on CPAP/Bilevel []  Other:         Start Order Date: 07/20/21    MEDICAL JUSTIFICATION:  I, the undersigned, certify that the above prescribed supplies are medically necessary for this patients wellbeing. In my opinion, the supplies are both reasonable and necessary in reference to accepted standards of medicalpractice in treatment of this patients condition. Aimee Saenz NP    NPI: [id number]       Order Signed Date: 07/20/21  [facility]  Pulmonary, Sleep, and Critical Care    Pulmonary, Sleep, and Critical Care  [facility] [address] [place] [address].  [facility], [address] [facility], [facility] , [address] [facility]  Phone: [phone]    Fax: [phone] [address] Mojgan Clemens  1950  [address] [place] [address] Street  [phone] (home)   There is no such number on file (mobile). Insurance Info (confirm with patient if correct):  Payor/Plan Subscr  Sex Relation Sub. Ins. ID Effective Group Num   1. Hafsa Shah 1950 Male  [id number] Not Eff                                    [address] [address]   2.  Hilda Sleeper 1950 Male  [id number] Not Eff                                    [address]

## 2021-07-20 NOTE — LETTER
[facility] Sleep Medicine  [address] [facility]  [place]  [address]  Phone: [phone]  Fax: [phone]    July 20, 2021       Patient: Danny Villagran   MR Number: [id number]   YOB: 1950   Date of Visit: 7/20/2021       Gokul Ramesh was seen for a follow up visit today. Here is my assessment and plan as well as an attached copy of his visit today:    Uncontrolled type 2 diabetes mellitus with complication, without long-term current use of insulin ([address]  Chronic- Stable. Discussed the importance of treating obstructive sleep apnea as part of the management of this disorder. Cont any meds per PCP and other physicians. Coronary artery disease due to lipid rich plaque  Chronic- Stable. Discussed the importance of treating obstructive sleep apnea as part of the management of this disorder. Cont any meds per PCP and other physicians. GERD (gastroesophageal reflux disease)  Chronic- Stable. Discussed the importance of treating obstructive sleep apnea as part of the management of this disorder. Cont any meds per PCP and other physicians. Diabetes mellitus (HCC)  Chronic- Stable. Discussed the importance of treating obstructive sleep apnea as part of the management of this disorder. Cont any meds per PCP and other physicians. Essential hypertension  Chronic- Stable. Discussed the importance of treating obstructive sleep apnea as part of the management of this disorder. Cont any meds per PCP and other physicians. Obstructive sleep apnea  Chronic-Stable: Reviewed and analyzed results of physiologic download from patient's machine and reviewed with patient. Supplies and parts as needed for his machine. These are medically necessary. Limit caffeine use after 3pm. Based on the analyzed data will continue with current settings. Patient to schedule an appointment with his equipment company for a mask fitting. Discussed sleep hygiene and napping during the day.  Verbal and written instruction on PAP equipment cleaning and disinfection schedule provided. Follow up in 3 months. If you have questions or concerns, please do not hesitate to call me. I look forward to following Rojean Felty along with you.     Sincerely,    KEYUR May providers:  Julius Alexis MD  [address] [place] [address]

## 2021-07-20 NOTE — ASSESSMENT & PLAN NOTE
Chronic-Stable: Reviewed and analyzed results of physiologic download from patient's machine and reviewed with patient. Supplies and parts as needed for his machine. These are medically necessary. Limit caffeine use after 3pm. Based on the analyzed data will continue with current settings. Patient to schedule an appointment with his equipment company for a mask fitting. Discussed sleep hygiene and napping during the day. Verbal and written instruction on PAP equipment cleaning and disinfection schedule provided. Follow up in 3 months.

## 2021-10-11 ENCOUNTER — TELEPHONE (OUTPATIENT)
Dept: CARDIOLOGY CLINIC | Age: 71
End: 2021-10-11

## 2021-10-11 NOTE — TELEPHONE ENCOUNTER
He is supposed to be getting his covid booster and wants to know if [facility] thinks he should ?

## 2021-10-11 NOTE — TELEPHONE ENCOUNTER
Told patient that Dr. Brandon Ma approves of his patient's get booster vaccinations for COVID. Patient voiced understanding.

## 2021-11-05 ENCOUNTER — OFFICE VISIT (OUTPATIENT)
Dept: PULMONOLOGY | Age: 71
End: 2021-11-05
Payer: MEDICARE

## 2021-11-05 VITALS
HEIGHT: 69 IN | BODY MASS INDEX: 32.38 KG/M2 | WEIGHT: 218.6 LBS | HEART RATE: 82 BPM | SYSTOLIC BLOOD PRESSURE: 142 MMHG | DIASTOLIC BLOOD PRESSURE: 70 MMHG | OXYGEN SATURATION: 95 %

## 2021-11-05 DIAGNOSIS — K21.00 GASTROESOPHAGEAL REFLUX DISEASE WITH ESOPHAGITIS WITHOUT HEMORRHAGE: ICD-10-CM

## 2021-11-05 DIAGNOSIS — G47.33 OBSTRUCTIVE SLEEP APNEA: Primary | Chronic | ICD-10-CM

## 2021-11-05 DIAGNOSIS — I25.10 CORONARY ARTERY DISEASE DUE TO LIPID RICH PLAQUE: ICD-10-CM

## 2021-11-05 DIAGNOSIS — E11.40 TYPE 2 DIABETES MELLITUS WITH DIABETIC NEUROPATHY, UNSPECIFIED WHETHER LONG TERM INSULIN USE (HCC): ICD-10-CM

## 2021-11-05 DIAGNOSIS — E66.9 OBESITY (BMI 30.0-34.9): ICD-10-CM

## 2021-11-05 DIAGNOSIS — I25.83 CORONARY ARTERY DISEASE DUE TO LIPID RICH PLAQUE: ICD-10-CM

## 2021-11-05 DIAGNOSIS — I10 ESSENTIAL HYPERTENSION: Chronic | ICD-10-CM

## 2021-11-05 PROCEDURE — 99214 OFFICE O/P EST MOD 30 MIN: CPT | Performed by: NURSE PRACTITIONER

## 2021-11-05 ASSESSMENT — SLEEP AND FATIGUE QUESTIONNAIRES
HOW LIKELY ARE YOU TO NOD OFF OR FALL ASLEEP WHEN YOU ARE A PASSENGER IN A CAR FOR AN HOUR WITHOUT A BREAK: 0
HOW LIKELY ARE YOU TO NOD OFF OR FALL ASLEEP WHILE SITTING INACTIVE IN A PUBLIC PLACE: 0
HOW LIKELY ARE YOU TO NOD OFF OR FALL ASLEEP WHILE WATCHING TV: 0
HOW LIKELY ARE YOU TO NOD OFF OR FALL ASLEEP WHILE SITTING AND READING: 3
HOW LIKELY ARE YOU TO NOD OFF OR FALL ASLEEP WHILE SITTING QUIETLY AFTER LUNCH WITHOUT ALCOHOL: 0
ESS TOTAL SCORE: 6
HOW LIKELY ARE YOU TO NOD OFF OR FALL ASLEEP IN A CAR, WHILE STOPPED FOR A FEW MINUTES IN TRAFFIC: 0
HOW LIKELY ARE YOU TO NOD OFF OR FALL ASLEEP WHILE SITTING AND TALKING TO SOMEONE: 0
HOW LIKELY ARE YOU TO NOD OFF OR FALL ASLEEP WHILE LYING DOWN TO REST IN THE AFTERNOON WHEN CIRCUMSTANCES PERMIT: 3

## 2021-11-05 NOTE — LETTER
[facility] Sleep Medicine  [address] [facility]  [place] [address] [address]  Phone: [phone]  Fax: [phone]    November 5, 2021       Patient: Prakash Calvillo   MR Number: [id number]   YOB: 1950   Date of Visit: 11/5/2021       Bryon Yang was seen for a follow up visit today. Here is my assessment and plan as well as an attached copy of his visit today:    Essential hypertension  Chronic- Stable. Discussed the importance of treating obstructive sleep apnea as part of the management of this disorder. Cont any meds per PCP and other physicians. Diabetes mellitus (HCC)   Chronic- Stable. Discussed the importance of treating obstructive sleep apnea as part of the management of this disorder. Cont any meds per PCP and other physicians. GERD (gastroesophageal reflux disease)  Chronic- Stable. Discussed the importance of treating obstructive sleep apnea as part of the management of this disorder. Cont any meds per PCP and other physicians. Coronary artery disease due to lipid rich plaque   Chronic- Stable. Discussed the importance of treating obstructive sleep apnea as part of the management of this disorder. Cont any meds per PCP and other physicians. Obesity (BMI 30.0-34. 9)  Chronic-not stable:  Discussed importance of treating obstructive sleep apnea and getting sufficient sleep to assist with weight control. Encouraged him to work on weight loss through diet and exercise. Recommended DASH or Mediterranean diets. Obstructive sleep apnea  Chronic-Stable: Reviewed and analyzed results of physiologic download from patient's machine and reviewed with patient. Supplies and parts as needed for his machine. These are medically necessary. Limit caffeine use after 3pm. Based on the analyzed data will change following settings: P min to 13. Verbal and written instruction on PAP equipment cleaning and disinfection schedule provided.  Will see back in 1 year or sooner if issues arise. Discussed the recall of Repironics devices with patient and the severity of his sleep apnea. Discussed the risks of untreated sleep apnea including but not limited to car accidents, heart attacks, strokes, and death. Alternative therapy may not exist or may be severely limited. Felt the benefits of continued usage of these devices may outweigh the risks identified in the recall notification. Advised to avoid use of unproven cleaning methods, such as ozone. Encouraged patient to register his machine for the recall and provided phone number. If you have questions or concerns, please do not hesitate to call me. I look forward to following Melissa Zuleta along with you.     Sincerely,    Luan Brittle, APRN [facility] providers:  Chanel Ashford MD  [address] [address]

## 2021-11-05 NOTE — ASSESSMENT & PLAN NOTE
Chronic-Stable: Reviewed and analyzed results of physiologic download from patient's machine and reviewed with patient. Supplies and parts as needed for his machine. These are medically necessary. Limit caffeine use after 3pm. Based on the analyzed data will change following settings: P min to 13. Verbal and written instruction on PAP equipment cleaning and disinfection schedule provided. Will see back in 1 year or sooner if issues arise. Discussed the recall of Repironics devices with patient and the severity of his sleep apnea. Discussed the risks of untreated sleep apnea including but not limited to car accidents, heart attacks, strokes, and death. Alternative therapy may not exist or may be severely limited. Felt the benefits of continued usage of these devices may outweigh the risks identified in the recall notification. Advised to avoid use of unproven cleaning methods, such as ozone. Encouraged patient to register his machine for the recall and provided phone number.

## 2021-11-05 NOTE — PROGRESS NOTES
Sowmya Cruz MD, Amina Biggs, [facility]  Tiffanie Kehrt CNP  Krissy Jaimes CNP [address]  [address] [facility], [address] [facility]- [phone]   [facility] [address]  [address]. [address] [place] [address]. Jeanette Allred [address] [phone]     [place] [id number] [phone]      Assessment/Plan:     1. Obstructive sleep apnea  Assessment & Plan:  Chronic-Stable: Reviewed and analyzed results of physiologic download from patient's machine and reviewed with patient. Supplies and parts as needed for his machine. These are medically necessary. Limit caffeine use after 3pm. Based on the analyzed data will change following settings: P min to 13. Verbal and written instruction on PAP equipment cleaning and disinfection schedule provided. Will see back in 1 year or sooner if issues arise. Discussed the recall of Repironics devices with patient and the severity of his sleep apnea. Discussed the risks of untreated sleep apnea including but not limited to car accidents, heart attacks, strokes, and death. Alternative therapy may not exist or may be severely limited. Felt the benefits of continued usage of these devices may outweigh the risks identified in the recall notification. Advised to avoid use of unproven cleaning methods, such as ozone. Encouraged patient to register his machine for the recall and provided phone number. 2. Coronary artery disease due to lipid rich plaque  Assessment & Plan:   Chronic- Stable. Discussed the importance of treating obstructive sleep apnea as part of the management of this disorder. Cont any meds per PCP and other physicians. 3. Essential hypertension  Assessment & Plan:  Chronic- Stable. Discussed the importance of treating obstructive sleep apnea as part of the management of this disorder. Cont any meds per PCP and other physicians.     4. Type 2 diabetes mellitus with diabetic neuropathy, unspecified whether long term insulin use ([facility]  Assessment & Plan:   Chronic- Stable. Discussed the importance of treating obstructive sleep apnea as part of the management of this disorder. Cont any meds per PCP and other physicians. 5. Gastroesophageal reflux disease with esophagitis without hemorrhage  Assessment & Plan:  Chronic- Stable. Discussed the importance of treating obstructive sleep apnea as part of the management of this disorder. Cont any meds per PCP and other physicians. 6. Obesity (BMI 30.0-34. 9)  Assessment & Plan:  Chronic-not stable:  Discussed importance of treating obstructive sleep apnea and getting sufficient sleep to assist with weight control. Encouraged him to work on weight loss through diet and exercise. Recommended DASH or Mediterranean diets. Reviewed, analyzed, and documented physiologic data from patient's PAP machine. This information was analyzed to assess complexity and medical decision making in regards to further testing and management. The primary encounter diagnosis was Obstructive sleep apnea. Diagnoses of Coronary artery disease due to lipid rich plaque, Essential hypertension, Type 2 diabetes mellitus with diabetic neuropathy, unspecified whether long term insulin use ([facility] Gastroesophageal reflux disease with esophagitis without hemorrhage, and Obesity (BMI 30.0-34.9) were also pertinent to this visit. The chronic medical conditions listed are directly related to the primary diagnosis listed above. The management of the primary diagnosis affects the secondary diagnosis and vice versa. Subjective:   Subjective   Patient ID: Liam Simmonds is a 80 y.o. male. Chief Complaint   Patient presents with    Sleep Apnea     trouble adjusting mask, then when he has it on he feels there is not enough pressure.        HPI:  Machine Modem/Download Info:  Compliance (hours/night): 7.77 hrs/night  % of nights >= 4 hrs: 84.4 %  Download AHI (/hour): 2.2 /HR  Average CPAP Pressure : 12 cmH2O      APAP - Settings  Pressure Min: 11 cmH2O  Pressure Max: 20 cmH2O                 Comfort Settings  Humidity Level (0-8): 0  Heated Tubing (Yes/No): Yes  Flex/EPR (0-3): 3 PAP Mask  Clinically Relevant Leak: Yes     So Kauffman reports he is doing well with his machine. Had a respiratory infection and stopped using the machine for a few days. Got a new mask but has only tried it once. Pressure feels good when he first puts the mask on and then after 5 minutes it feels low. Waking once during the night. Pressure feels low at times. Having some trouble with getting his mask to seal. he is waking rested for the most part. he denies headaches, congestion, nosebleeds, dryness, aerophagia, or drowsiness while driving. Would like to come back in a year but will call if still issues after pressure changes. [address] -  6    Social History     Socioeconomic History    Marital status: Single     Spouse name: Not on file    Number of children: Not on file    Years of education: Not on file    Highest education level: Not on file   Occupational History    Not on file   Tobacco Use    Smoking status: Current Some Day Smoker     Packs/day: 0.50     Years: 10.00     Pack years: 5.00     Types: Cigarettes     Last attempt to quit: 2017     Years since quittin.4    Smokeless tobacco: Never Used   Vaping Use    Vaping Use: Never used   Substance and Sexual Activity    Alcohol use: No    Drug use: No    Sexual activity: Yes     Partners: Female   Other Topics Concern    Not on file   Social History Narrative    Not on file     Social Determinants of Health     Financial Resource Strain:     Difficulty of Paying Living Expenses:    Food Insecurity:     Worried About Running Out of Food in the Last Year:     Ran Out of Food in the Last Year:    Transportation Needs:     Lack of Transportation (Medical):      Lack of Transportation (Non-Medical):    Physical Activity:     Days of Exercise per Week:     Minutes of Exercise per Session:    Stress:     Feeling of Stress :    Social Connections:     Frequency of Communication with Friends and Family:     Frequency of Social Gatherings with Friends and Family:     Attends Muslim Services:     Active Member of Clubs or Organizations:     Attends Club or Organization Meetings:     Marital Status:    Intimate Partner Violence:     Fear of Current or Ex-Partner:     Emotionally Abused:     Physically Abused:     Sexually Abused:        Current Outpatient Medications   Medication Instructions    aspirin EC 81 mg, Oral, DAILY    atorvastatin (LIPITOR) 80 MG tablet TAKE 1 TABLET BY MOUTH EVERY DAY    Blood Pressure Monitoring (BLOOD PRESSURE CUFF) MISC 1 Units, Does not apply, DAILY    CPAP Machine MISC Does not apply    diphenoxylate-atropine (LOMOTIL) 2.5-0.025 MG per tablet 1 tid    divalproex (DEPAKOTE ER) 500 MG extended release tablet TAKE 3 TABLETS BY MOUTH EVERY DAY    FLUoxetine (PROZAC) 20 mg, Oral, DAILY    hydrochlorothiazide (HYDRODIURIL) 25 MG tablet TAKE 1 TABLET BY MOUTH EVERY DAY    lisinopril (PRINIVIL;ZESTRIL) 20 MG tablet TAKE 1 TABLET BY MOUTH DAILY    METFORMIN HCL PO 1,000 mg, Oral, 2 TIMES DAILY    metoprolol tartrate (LOPRESSOR) 25 MG tablet TAKE 1 TABLET BY MOUTH 2 TIMES DAILY    omeprazole (PRILOSEC) 20 mg, Oral, DAILY    potassium chloride (KLOR-CON) 10 MEQ extended release tablet TAKE 1 TABLET BY MOUTH EVERY DAY    REPATHA SURECLICK 914 MG/ML SOAJ INJECT 1 PEN SC EVERY 2 WEEKS    tamsulosin (FLOMAX) 0.4 MG capsule TAKE ONE CAPSULE BY MOUTH EVERY DAY IN THE EVENING    triamcinolone (KENALOG) 0.1 % cream No dose, route, or frequency recorded.     VASCEPA 1 g CAPS capsule TAKE 2 CAPSULES BY MOUTH TWICE A DAY WITH FOOD          Vitals:  Weight BMI   Wt Readings from Last 3 Encounters:   11/05/21 218 lb 9.6 oz (99.2 kg)   07/20/21 213 lb 9.6 oz (96.9 kg)   06/14/21 211 lb 12.8 oz (96.1 kg)    Body mass index is 32.28 kg/m².      BP HR SaO2   BP Readings from Last 3 Encounters:   11/05/21 (!) 142/70   06/14/21 130/84   06/07/21 130/79    Pulse Readings from Last 3 Encounters:   11/05/21 82   07/20/21 76   06/14/21 72    SpO2 Readings from Last 3 Encounters:   11/05/21 95%   07/20/21 95%   06/14/21 95%        Electronically signed by KEYUR Perez on 11/5/2021 at 6:59 PM

## 2021-11-05 NOTE — PROGRESS NOTES
Diagnosis: [x] IFEANYI (G47.33) [] CSA (G47.31) [] Apnea (G47.30)   Length of Need: [x] 15 Months [] 99 Months [] Other:   Machine (JOSIE!): [] Respironics Dream Station      Auto [] ResMed AirSense     Auto [] Other:     []  CPAP () [] Bilevel ()   Mode: [] Auto [] Spontaneous    Mode: [] Auto [] Spontaneous                        Comfort Settings: Ramp Pressure: 5 cmH2O  Ramp time: 15 min  Flex/EPR - 3 full time                                  For ResMed Bileve (TiMax-4 sec   TiMin- 0.2 sec)     Humidifier: [] Heated ()        [x] Water chamber replacement ()/ 1 per 6 months        Mask:   [] Nasal () /1 per 3 months [x] Full Face () /1 per 3 months   [] Patient choice -Size and fit mask [x] Patient Choice - Size and fit mask   [] Dispense: [] Dispense:   [] Headgear () / 1 per 3 months [x] Headgear () / 1 per 3 months   [] Replacement Nasal Cushion ()/2 per month [x] Interface Replacement ()/1 per month   [] Replacement Nasal Pillows ()/2 per month         Tubing: [x] Heated ()/1 per 3 months    [] Standard ()/1 per 3 months [] Other:           Filters: [x] Non-disposable ()/1 per 6 months     [x] Ultra-Fine, Disposable ()/2 per month        Miscellaneous: [] Chin Strap ()/ 1 per 6 months [] O2 bleed-in:        LPM   [] Oxymetry on CPAP/Bilevel []  Other:         Start Order Date: 11/05/21    MEDICAL JUSTIFICATION:  I, the undersigned, certify that the above prescribed supplies are medically necessary for this patients wellbeing. In my opinion, the supplies are both reasonable and necessary in reference to accepted standards of medicalpractice in treatment of this patients condition. Tatiana Ahumada NP    NPI: [id number]       Order Signed Date: 11/05/21  [address] [place] [address]  Pulmonary, Sleep, and Critical Care    Pulmonary, Sleep, and Critical Care  [address] [facility] [place].  [address], [address] [facility] , [address]                                    THE [facility] AT [facility]  Phone: [phone]    Fax: [phone]    Gregg Smith  1950  [address]  [address] [place] [address]  [phone] (home)   There is no such number on file (mobile). Insurance Info (confirm with patient if correct):  Payor/Plan Subscr  Sex Relation Sub. Ins. ID Effective Group Num   1. Jayden Slimmer 1950 Male  [id number] Not Eff                                    [address] [address]   2.  Danielle Mitts 1950 Male  [id number] Not Eff                                    [address]

## 2021-12-01 DIAGNOSIS — G40.109 PARTIAL SYMPTOMATIC EPILEPSY WITH SIMPLE PARTIAL SEIZURES, NOT INTRACTABLE, WITHOUT STATUS EPILEPTICUS (HCC): ICD-10-CM

## 2021-12-02 RX ORDER — DIVALPROEX SODIUM 500 MG/1
TABLET, EXTENDED RELEASE ORAL
Qty: 270 TABLET | Refills: 1 | Status: SHIPPED | OUTPATIENT
Start: 2021-12-02 | End: 2022-06-20 | Stop reason: SDUPTHER

## 2021-12-13 ENCOUNTER — OFFICE VISIT (OUTPATIENT)
Dept: NEUROLOGY | Age: 71
End: 2021-12-13
Payer: MEDICARE

## 2021-12-13 VITALS
DIASTOLIC BLOOD PRESSURE: 73 MMHG | HEIGHT: 69 IN | BODY MASS INDEX: 33.03 KG/M2 | WEIGHT: 223 LBS | SYSTOLIC BLOOD PRESSURE: 125 MMHG | HEART RATE: 72 BPM

## 2021-12-13 DIAGNOSIS — G40.109 PARTIAL SYMPTOMATIC EPILEPSY WITH SIMPLE PARTIAL SEIZURES, NOT INTRACTABLE, WITHOUT STATUS EPILEPTICUS (HCC): Primary | ICD-10-CM

## 2021-12-13 DIAGNOSIS — T88.7XXA MEDICATION SIDE EFFECT: ICD-10-CM

## 2021-12-13 PROCEDURE — 99213 OFFICE O/P EST LOW 20 MIN: CPT | Performed by: PSYCHIATRY & NEUROLOGY

## 2021-12-13 RX ORDER — DIVALPROEX SODIUM 500 MG/1
TABLET, EXTENDED RELEASE ORAL
Qty: 270 TABLET | Refills: 1 | Status: CANCELLED | OUTPATIENT
Start: 2021-12-13

## 2021-12-13 NOTE — PROGRESS NOTES
Tomasz Mcclain   Neurology followup    Subjective:   CC/HP  History was obtained from the patient. Patient is partial complex seizures. He is doing well without any further seizures. He denies any other neurological symptoms. No side effects to medication  Patient still continues to have tenderness over the skin on the right upper quadrant of the abdomen. He feels it is nerve damage from when he fell. There has been no herpes zoster rash seen. He was told to put on some cream but patient states it still somewhat tender and painful. He has also noticed tremors in his right hand since he went back on the Depakote.     REVIEW OF SYSTEMS    Constitutional:  []   Chills   [x]  Fatigue   []  Fevers   []  Malaise   []  Weight loss     [] Denies all of the above    Respiratory:   []  Cough    []  Shortness of breath         [x] Denies all of the above     Cardiovascular:   []  Chest pain    []  Exertional chest pressure/discomfort           [] Palpitations    []  Syncope     [x] Denies all of the above        Past Medical History:   Diagnosis Date    Anxiety     BPH (benign prostatic hyperplasia)     CAD (coronary artery disease) 10/25/2013    s/p angioplsty 2    Chronic back pain oct 2008    in [place] [address]    DDD (degenerative disc disease), lumbosacral 1/7/2013    Depression     Eosinophilic granuloma (HCC)     sp biopsy 10 years by Dr Alise Gillis [facility])     Hyperlipidemia     Hypertension     Irritable bowel syndrome     Myofascial pain syndrome 1/7/2013    Obstructive sleep apnea (adult) (pediatric) 10/9/2014    Type II or unspecified type diabetes mellitus without mention of complication, not stated as uncontrolled      Family History   Problem Relation Age of Onset    High Blood Pressure Mother     Diabetes Father     Heart Disease Father     High Blood Pressure Father      Social History     Socioeconomic History    Marital status: Single     Spouse name: Not on file    Number of m)   Wt 223 lb (101.2 kg)   BMI 32.93 kg/m²   Patient is awake, alert and oriented x3. Speech is normal.  Pupils are equal round reacting to light. Extraocular movements intact. Face symmetrical. Tongue midline. Motor exam shows normal symmetrical strength. Deep tendon reflexes normal. Plantar reflexes downgoing. Sensory exam normal. Coordination normal. Gait normal. No carotid bruit. No neck stiffness. Data :  LABS:  General Labs:    CBC:   Lab Results   Component Value Date    WBC 6.9 06/07/2021    RBC 4.94 06/07/2021    HGB 15.1 06/07/2021    HCT 43.7 06/07/2021    MCV 88.5 06/07/2021    MCH 30.7 06/07/2021    MCHC 34.6 06/07/2021    RDW 13.9 06/07/2021     06/07/2021    MPV 9.8 06/07/2021     BMP:    Lab Results   Component Value Date     06/15/2021    K 4.3 06/15/2021    K 3.6 09/23/2020     06/15/2021    CO2 27 06/15/2021    BUN 18 06/15/2021    LABALBU 4.0 06/15/2021    CREATININE 1.0 06/15/2021    CALCIUM 8.8 06/15/2021    GFRAA >60 06/15/2021    GFRAA >60 04/12/2013    LABGLOM >60 06/15/2021    GLUCOSE 111 06/15/2021     RADIOLOGY REVIEW:  I have reviewed radiology report(s) of: MRI brain    Impression :  Partial simple seizures  Platelet counts are now normal.  MRI brain normal  The right hand rest tremor has now come back after he started taking the Depakote again. There is no cogwheel rigidity and no other evidence of Parkinson's disease. There is no rash on his upper quadrant of the abdomen. Plan :  Refill Depakote  mg 3 tablets daily  Return in 6 months          Please note a portion of  this chart was generated using dragon dictation software. Although every effort was made to ensure the accuracy of this automated transcription, some errors in transcription may have occurred.      Pursuant to the emergency declaration under the [facility] waiver authority and the [employer] Appropriations Act, this Virtual  Visit was conducted, with patient's consent, to reduce the patient's risk of exposure to COVID-19 and provide continuity of care for an established patient.

## 2022-05-03 ENCOUNTER — TELEPHONE (OUTPATIENT)
Dept: PULMONOLOGY | Age: 72
End: 2022-05-03

## 2022-05-03 NOTE — TELEPHONE ENCOUNTER
Spoke with patient and explained that is was probably [facility] he spoke with. Patient said he got everything but his head gear. Told patient I would reach out to [facility] to see why it is delayed. Left message for [facility] to check on order.

## 2022-05-03 NOTE — TELEPHONE ENCOUNTER
Patient is having trouble getting his supplies, [facility] told him they are waiting on an order from us to refill his supplies. [phone].

## 2022-06-20 ENCOUNTER — OFFICE VISIT (OUTPATIENT)
Dept: NEUROLOGY | Age: 72
End: 2022-06-20
Payer: MEDICARE

## 2022-06-20 VITALS
HEART RATE: 90 BPM | BODY MASS INDEX: 33.03 KG/M2 | SYSTOLIC BLOOD PRESSURE: 138 MMHG | WEIGHT: 223 LBS | HEIGHT: 69 IN | DIASTOLIC BLOOD PRESSURE: 82 MMHG

## 2022-06-20 DIAGNOSIS — G40.109 PARTIAL SYMPTOMATIC EPILEPSY WITH SIMPLE PARTIAL SEIZURES, NOT INTRACTABLE, WITHOUT STATUS EPILEPTICUS (HCC): ICD-10-CM

## 2022-06-20 LAB
ALT SERPL-CCNC: 12 U/L (ref 10–40)
AST SERPL-CCNC: 11 U/L (ref 15–37)
BASOPHILS ABSOLUTE: 0 K/UL (ref 0–0.2)
BASOPHILS RELATIVE PERCENT: 0.6 %
EOSINOPHILS ABSOLUTE: 0.1 K/UL (ref 0–0.6)
EOSINOPHILS RELATIVE PERCENT: 1.7 %
HCT VFR BLD CALC: 41.1 % (ref 40.5–52.5)
HEMOGLOBIN: 14.3 G/DL (ref 13.5–17.5)
LYMPHOCYTES ABSOLUTE: 2.4 K/UL (ref 1–5.1)
LYMPHOCYTES RELATIVE PERCENT: 37.8 %
MCH RBC QN AUTO: 30.9 PG (ref 26–34)
MCHC RBC AUTO-ENTMCNC: 34.7 G/DL (ref 31–36)
MCV RBC AUTO: 88.9 FL (ref 80–100)
MONOCYTES ABSOLUTE: 0.5 K/UL (ref 0–1.3)
MONOCYTES RELATIVE PERCENT: 7.9 %
NEUTROPHILS ABSOLUTE: 3.3 K/UL (ref 1.7–7.7)
NEUTROPHILS RELATIVE PERCENT: 52 %
PDW BLD-RTO: 13.9 % (ref 12.4–15.4)
PLATELET # BLD: 162 K/UL (ref 135–450)
PMV BLD AUTO: 8.5 FL (ref 5–10.5)
RBC # BLD: 4.62 M/UL (ref 4.2–5.9)
WBC # BLD: 6.3 K/UL (ref 4–11)

## 2022-06-20 PROCEDURE — 1123F ACP DISCUSS/DSCN MKR DOCD: CPT | Performed by: PSYCHIATRY & NEUROLOGY

## 2022-06-20 PROCEDURE — 99213 OFFICE O/P EST LOW 20 MIN: CPT | Performed by: PSYCHIATRY & NEUROLOGY

## 2022-06-20 RX ORDER — DIVALPROEX SODIUM 500 MG/1
TABLET, EXTENDED RELEASE ORAL
Qty: 270 TABLET | Refills: 1 | Status: SHIPPED | OUTPATIENT
Start: 2022-06-20

## 2022-06-20 NOTE — PROGRESS NOTES
Alta Huynh   Neurology followup    Subjective:   CC/HP  History was obtained from the patient. Patient is partial complex seizures. He is doing well without any further seizures. He denies any other neurological symptoms. No side effects to medication  Patient stated that the tremors in his hands have improved.     REVIEW OF SYSTEMS    Constitutional:  []   Chills   []  Fatigue   []  Fevers   []  Malaise   []  Weight loss     [x] Denies all of the above    Respiratory:   []  Cough    []  Shortness of breath         [x] Denies all of the above     Cardiovascular:   []  Chest pain    []  Exertional chest pressure/discomfort           [] Palpitations    []  Syncope     [x] Denies all of the above        Past Medical History:   Diagnosis Date    Anxiety     BPH (benign prostatic hyperplasia)     CAD (coronary artery disease) 10/25/2013    s/p angioplsty 2    Chronic back pain oct 2008    in [place] [address]    DDD (degenerative disc disease), lumbosacral 1/7/2013    Depression     Eosinophilic granuloma (HCC)     sp biopsy 10 years by Dr [facility])     Hyperlipidemia     Hypertension     Irritable bowel syndrome     Myofascial pain syndrome 1/7/2013    Obstructive sleep apnea (adult) (pediatric) 10/9/2014    Type II or unspecified type diabetes mellitus without mention of complication, not stated as uncontrolled      Family History   Problem Relation Age of Onset    High Blood Pressure Mother     Diabetes Father     Heart Disease Father     High Blood Pressure Father      Social History     Socioeconomic History    Marital status: Single     Spouse name: Not on file    Number of children: Not on file    Years of education: Not on file    Highest education level: Not on file   Occupational History    Not on file   Tobacco Use    Smoking status: Current Some Day Smoker     Packs/day: 0.50     Years: 10.00     Pack years: 5.00     Types: Cigarettes     Last attempt to quit: 5/26/2017 Years since quittin.0    Smokeless tobacco: Never Used   Vaping Use    Vaping Use: Never used   Substance and Sexual Activity    Alcohol use: No    Drug use: No    Sexual activity: Yes     Partners: Female   Other Topics Concern    Not on file   Social History Narrative    Not on file     Social Determinants of Health     Financial Resource Strain:     Difficulty of Paying Living Expenses: Not on file   Food Insecurity:     Worried About Running Out of Food in the Last Year: Not on file    Noah of Food in the Last Year: Not on file   Transportation Needs:     Lack of Transportation (Medical): Not on file    Lack of Transportation (Non-Medical): Not on file   Physical Activity:     Days of Exercise per Week: Not on file    Minutes of Exercise per Session: Not on file   Stress:     Feeling of Stress : Not on file   Social Connections:     Frequency of Communication with Friends and Family: Not on file    Frequency of Social Gatherings with Friends and Family: Not on file    Attends Restoration Services: Not on file    Active Member of [address] [place] [address] or Organizations: Not on file    Attends Club or Organization Meetings: Not on file    Marital Status: Not on file   Intimate Partner Violence:     Fear of Current or Ex-Partner: Not on file    Emotionally Abused: Not on file    Physically Abused: Not on file    Sexually Abused: Not on file   Housing Stability:     Unable to Pay for Housing in the Last Year: Not on file    Number of [place] in the Last Year: Not on file    Unstable Housing in the Last Year: Not on file        Objective:  Exam:  /82   Pulse 90   Ht 5' 9\" (1.753 m)   Wt 223 lb (101.2 kg)   BMI 32.93 kg/m²   Patient is awake, alert and oriented x3. Speech is normal.  Pupils are equal round reacting to light. Extraocular movements intact. Face symmetrical. Tongue midline. Motor exam shows normal symmetrical strength.  Deep tendon reflexes normal. Plantar reflexes downgoing. Sensory exam normal. Coordination normal. Gait normal. No carotid bruit. No neck stiffness. Data :  LABS:  General Labs:    CBC:   Lab Results   Component Value Date    WBC 6.9 06/07/2021    RBC 4.94 06/07/2021    HGB 15.1 06/07/2021    HCT 43.7 06/07/2021    MCV 88.5 06/07/2021    MCH 30.7 06/07/2021    MCHC 34.6 06/07/2021    RDW 13.9 06/07/2021     06/07/2021    MPV 9.8 06/07/2021     BMP:    Lab Results   Component Value Date     06/15/2021    K 4.3 06/15/2021    K 3.6 09/23/2020     06/15/2021    CO2 27 06/15/2021    BUN 18 06/15/2021    LABALBU 4.0 06/15/2021    CREATININE 1.0 06/15/2021    CALCIUM 8.8 06/15/2021    GFRAA >60 06/15/2021    GFRAA >60 04/12/2013    LABGLOM >60 06/15/2021    GLUCOSE 111 06/15/2021     RADIOLOGY REVIEW:  I have reviewed radiology report(s) of: MRI brain    Impression :  Partial simple seizures  Platelet counts are now normal.  MRI brain normal  The tremors in his hands seem to be much improved and almost resolved. Plan :  Refill Depakote  mg 3 tablets daily  I will get repeat CBC and liver enzyme testing. Return in 6 months          Please note a portion of  this chart was generated using dragon dictation software. Although every effort was made to ensure the accuracy of this automated transcription, some errors in transcription may have occurred.

## 2022-11-09 DIAGNOSIS — G40.109 PARTIAL SYMPTOMATIC EPILEPSY WITH SIMPLE PARTIAL SEIZURES, NOT INTRACTABLE, WITHOUT STATUS EPILEPTICUS (HCC): ICD-10-CM

## 2022-11-09 RX ORDER — DIVALPROEX SODIUM 500 MG/1
TABLET, EXTENDED RELEASE ORAL
Qty: 270 TABLET | Refills: 1 | OUTPATIENT
Start: 2022-11-09

## 2022-11-10 ENCOUNTER — TELEPHONE (OUTPATIENT)
Dept: PULMONOLOGY | Age: 72
End: 2022-11-10

## 2022-12-12 ENCOUNTER — OFFICE VISIT (OUTPATIENT)
Dept: NEUROLOGY | Age: 72
End: 2022-12-12
Payer: MEDICARE

## 2022-12-12 VITALS
SYSTOLIC BLOOD PRESSURE: 161 MMHG | BODY MASS INDEX: 32.14 KG/M2 | WEIGHT: 217 LBS | HEIGHT: 69 IN | DIASTOLIC BLOOD PRESSURE: 67 MMHG | HEART RATE: 94 BPM

## 2022-12-12 DIAGNOSIS — R25.1 TREMOR: ICD-10-CM

## 2022-12-12 DIAGNOSIS — G40.109 PARTIAL SYMPTOMATIC EPILEPSY WITH SIMPLE PARTIAL SEIZURES, NOT INTRACTABLE, WITHOUT STATUS EPILEPTICUS (HCC): Primary | ICD-10-CM

## 2022-12-12 PROCEDURE — 3074F SYST BP LT 130 MM HG: CPT | Performed by: PSYCHIATRY & NEUROLOGY

## 2022-12-12 PROCEDURE — 1123F ACP DISCUSS/DSCN MKR DOCD: CPT | Performed by: PSYCHIATRY & NEUROLOGY

## 2022-12-12 PROCEDURE — 3078F DIAST BP <80 MM HG: CPT | Performed by: PSYCHIATRY & NEUROLOGY

## 2022-12-12 PROCEDURE — 99214 OFFICE O/P EST MOD 30 MIN: CPT | Performed by: PSYCHIATRY & NEUROLOGY

## 2022-12-12 RX ORDER — DIVALPROEX SODIUM 500 MG/1
TABLET, EXTENDED RELEASE ORAL
Qty: 270 TABLET | Refills: 1 | Status: SHIPPED | OUTPATIENT
Start: 2022-12-12

## 2022-12-12 NOTE — PROGRESS NOTES
[facility]   Neurology followup    Subjective:   CC/HP  History was obtained from the patient. Patient is partial complex seizures. He is doing well without any further seizures. He denies any other neurological symptoms. No side effects to medication  Patient stated that the tremors in his hands have improved. Patient's last seizure was approximately around 2013  New symptom-patient states in the last week he has noticed some shaking/tremors in the right leg. He is not sure if it is from anxiety or if there is something else.     REVIEW OF SYSTEMS    Constitutional:  []   Chills   []  Fatigue   []  Fevers   []  Malaise   []  Weight loss     [x] Denies all of the above    Respiratory:   []  Cough    []  Shortness of breath         [x] Denies all of the above     Cardiovascular:   []  Chest pain    []  Exertional chest pressure/discomfort           [] Palpitations    []  Syncope     [x] Denies all of the above        Past Medical History:   Diagnosis Date    Anxiety     BPH (benign prostatic hyperplasia)     CAD (coronary artery disease) 10/25/2013    s/p angioplsty 2    Chronic back pain oct 2008    in [place] [id number]    DDD (degenerative disc disease), lumbosacral 1/7/2013    Depression     Eosinophilic granuloma ([facility] [address]     sp biopsy 10 years by Dr Aleksandr Hanley [facility])     Hyperlipidemia     Hypertension     Irritable bowel syndrome     Myofascial pain syndrome 1/7/2013    Obstructive sleep apnea (adult) (pediatric) 10/9/2014    Type II or unspecified type diabetes mellitus without mention of complication, not stated as uncontrolled      Family History   Problem Relation Age of Onset    High Blood Pressure Mother     Diabetes Father     Heart Disease Father     High Blood Pressure Father      Social History     Socioeconomic History    Marital status: Single     Spouse name: None    Number of children: None    Years of education: None    Highest education level: None   Tobacco Use    Smoking status: Some Days Packs/day: 0.50     Years: 10.00     Pack years: 5.00     Types: Cigarettes     Last attempt to quit: 2017     Years since quittin.5    Smokeless tobacco: Never   Vaping Use    Vaping Use: Never used   Substance and Sexual Activity    Alcohol use: No    Drug use: No    Sexual activity: Yes     Partners: Female        Objective:  Exam:  BP (!) 142/118   Pulse (!) 137   Ht 5' 9\" (1.753 m)   Wt 217 lb (98.4 kg)   BMI 32.05 kg/m²   Patient is awake, alert and oriented x3. Speech is normal.  Pupils are equal round reacting to light. Extraocular movements intact. Face symmetrical. Tongue midline. Motor exam shows normal symmetrical strength. Deep tendon reflexes normal. Plantar reflexes downgoing. Sensory exam normal. Coordination normal. Gait normal. No carotid bruit. No neck stiffness. Data :  LABS:  General Labs:    CBC:   Lab Results   Component Value Date/Time    WBC 6.3 2022 11:10 AM    RBC 4.62 2022 11:10 AM    HGB 14.3 2022 11:10 AM    HCT 41.1 2022 11:10 AM    MCV 88.9 2022 11:10 AM    MCH 30.9 2022 11:10 AM    MCHC 34.7 2022 11:10 AM    RDW 13.9 2022 11:10 AM     2022 11:10 AM    MPV 8.5 2022 11:10 AM     BMP:    Lab Results   Component Value Date/Time     06/15/2021 08:55 AM    K 4.3 06/15/2021 08:55 AM    K 3.6 2020 07:34 PM     06/15/2021 08:55 AM    CO2 27 06/15/2021 08:55 AM    BUN 18 06/15/2021 08:55 AM    LABALBU 4.0 06/15/2021 08:55 AM    CREATININE 1.0 06/15/2021 08:55 AM    CALCIUM 8.8 06/15/2021 08:55 AM    GFRAA >60 06/15/2021 08:55 AM    GFRAA >60 2013 10:45 AM    LABGLOM >60 06/15/2021 08:55 AM    GLUCOSE 111 06/15/2021 08:55 AM     RADIOLOGY REVIEW:  I have reviewed radiology report(s) of: MRI brain    Impression :  Partial simple seizures  Platelet counts are now normal.  MRI brain normal  New symptom of episodic tremor in the right foot and leg in the last week.   He is neuro exam looks unchanged. There is no clinical evidence of Parkinson's disease or similar illness. We will continue to monitor this. Plan :  Refill Depakote  mg 3 tablets daily  Lab testing back in June 2022 was normal.  I will repeat lab testing during the next visit in 6 months. Return in 6 months          Please note a portion of  this chart was generated using dragon dictation software. Although every effort was made to ensure the accuracy of this automated transcription, some errors in transcription may have occurred.

## 2023-01-06 ENCOUNTER — HOSPITAL ENCOUNTER (EMERGENCY)
Age: 73
Discharge: HOME | End: 2023-01-06
Payer: MEDICARE

## 2023-01-06 VITALS
SYSTOLIC BLOOD PRESSURE: 175 MMHG | OXYGEN SATURATION: 95 % | HEART RATE: 101 BPM | RESPIRATION RATE: 18 BRPM | TEMPERATURE: 98.3 F | DIASTOLIC BLOOD PRESSURE: 101 MMHG

## 2023-01-06 DIAGNOSIS — T63.441A: Primary | ICD-10-CM

## 2023-01-06 PROCEDURE — 99283 EMERGENCY DEPT VISIT LOW MDM: CPT

## 2023-01-06 RX ORDER — METHYLPREDNISOLONE 4 MG/1
TABLET ORAL
Qty: 1 KIT | Refills: 0 | Status: SHIPPED | OUTPATIENT
Start: 2023-01-06

## 2023-01-06 RX ORDER — HYDROXYZINE PAMOATE 25 MG/1
25-50 CAPSULE ORAL 3 TIMES DAILY PRN
Qty: 30 CAPSULE | Refills: 0 | Status: SHIPPED | OUTPATIENT
Start: 2023-01-06 | End: 2023-01-20

## 2023-01-06 ASSESSMENT — ENCOUNTER SYMPTOMS
DIARRHEA: 0
NAUSEA: 0
CHEST TIGHTNESS: 0
ABDOMINAL PAIN: 0
VOMITING: 0
SHORTNESS OF BREATH: 0

## 2023-01-06 ASSESSMENT — LIFESTYLE VARIABLES
HOW OFTEN DO YOU HAVE A DRINK CONTAINING ALCOHOL: NEVER
HOW MANY STANDARD DRINKS CONTAINING ALCOHOL DO YOU HAVE ON A TYPICAL DAY: PATIENT DOES NOT DRINK

## 2023-01-06 NOTE — ED PROVIDER NOTES
[address] [facility]        Pt Name: Lynda Azevedo  MRN: [id number]  [place] 1950  Date of evaluation: 1/6/2023  Provider: Ashley Nielson PA-C  PCP: Candido Bennett MD  Note Started: 2:52 PM EST 1/6/23      SAMMY. I have evaluated this patient. My supervising physician was available for consultation. CHIEF COMPLAINT       Chief Complaint   Patient presents with    Insect Bite     Pt states he was stung in the L hand by an insect when cleaning out his bushes. He states this happened on Wednesday. HISTORY OF PRESENT ILLNESS: 1 or more Elements     History from : Patient    Limitations to history : None    Lynda Azevedo is a 71 y.o. male who presents to the emergency department today stating he was stung on the left hand by a bee on Wednesday. He states there is still mild swelling and pain in the area. He wanted to be checked to make sure the stinger was not still in. He denies redness or warmth of the hand. He denies chest pain, shortness of breath or throat swelling. Nursing Notes were all reviewed and agreed with or any disagreements were addressed in the HPI. REVIEW OF SYSTEMS :      Review of Systems   Constitutional:  Negative for chills and fever. Respiratory:  Negative for chest tightness and shortness of breath. Cardiovascular:  Negative for chest pain. Gastrointestinal:  Negative for abdominal pain, diarrhea, nausea and vomiting. Genitourinary:  Negative for dysuria. Skin:  Positive for wound. All other systems reviewed and are negative. Positives and Pertinent negatives as per HPI.      SURGICAL HISTORY     Past Surgical History:   Procedure Laterality Date    CORONARY ANGIOPLASTY WITH STENT PLACEMENT  10/2013    ELBOW BURSA SURGERY Left 7/24/2014    EXCISION OF OLECRANON BURSA, LEFT ELBOW    LUNG BIOPSY         CURRENTMEDICATIONS       Discharge Medication List as of 1/6/2023  2:48 PM        CONTINUE these medications which have NOT CHANGED    Details   divalproex (DEPAKOTE ER) 500 MG extended release tablet TAKE 3 TABLETS BY MOUTH EVERY DAY, Disp-270 tablet, R-1Normal      triamcinolone (KENALOG) 0.1 % cream Historical Med      CPAP Machine MISC Historical Med      atorvastatin (LIPITOR) 80 MG tablet TAKE 1 TABLET BY MOUTH EVERY DAY, Disp-30 tablet, R-1Pt needs labs for future refills. Normal      METFORMIN HCL PO Take 1,000 mg by mouth 2 times daily Historical Med      REPATHA SURECLICK 401 MG/ML SOAJ INJECT 1 PEN SC EVERY 2 WEEKS, R-11, DAWHistorical Med      VASCEPA 1 g CAPS capsule TAKE 2 CAPSULES BY MOUTH TWICE A DAY WITH FOOD, R-2, DAWHistorical Med      potassium chloride (KLOR-CON) 10 MEQ extended release tablet TAKE 1 TABLET BY MOUTH EVERY DAY, R-0Historical Med      aspirin EC 81 MG EC tablet Take 1 tablet by mouth daily, Disp-90 tablet, R-3OTC      hydrochlorothiazide (HYDRODIURIL) 25 MG tablet TAKE 1 TABLET BY MOUTH EVERY DAY, R-2Historical Med      metoprolol tartrate (LOPRESSOR) 25 MG tablet TAKE 1 TABLET BY MOUTH 2 TIMES DAILY, Disp-180 tablet, R-1Normal      omeprazole (PRILOSEC) 20 MG delayed release capsule Take 20 mg by mouth dailyHistorical Med      lisinopril (PRINIVIL;ZESTRIL) 20 MG tablet TAKE 1 TABLET BY MOUTH DAILY, Disp-90 tablet, R-3Normal      Blood Pressure Monitoring (BLOOD PRESSURE CUFF) MISC DAILY Starting 6/2/2017, Until Discontinued, Disp-1 each, R-0, Normal      FLUoxetine (PROZAC) 20 MG capsule Take 1 capsule by mouth daily, Disp-30 capsule, R-2      tamsulosin (FLOMAX) 0.4 MG capsule TAKE ONE CAPSULE BY MOUTH EVERY DAY IN THE EVENING, Disp-30 capsule, R-2      diphenoxylate-atropine (LOMOTIL) 2.5-0.025 MG per tablet 1 tid, Disp-90 tablet, R-0             ALLERGIES     Penicillins, Benadryl [diphenhydramine hcl], Erythromycin, and Iodine    FAMILYHISTORY       Family History   Problem Relation Age of Onset    High Blood Pressure Mother     Diabetes Father     Heart Disease Father     High Blood Pressure Father         SOCIAL HISTORY       Social History     Tobacco Use    Smoking status: Some Days     Packs/day: 0.50     Years: 10.00     Pack years: 5.00     Types: Cigarettes     Last attempt to quit: 2017     Years since quittin.6    Smokeless tobacco: Never   Vaping Use    Vaping Use: Never used   Substance Use Topics    Alcohol use: No    Drug use: No       SCREENINGS        Rose Coma Scale  Eye Opening: Spontaneous  Best Verbal Response: Oriented  Best Motor Response: Obeys commands  [place] Coma Scale Score: 15                CIWA Assessment  BP: (!) 175/101  Heart Rate: (!) 101           PHYSICAL EXAM  1 or more Elements     ED Triage Vitals [23 1421]   BP Temp Temp Source Heart Rate Resp SpO2 Height Weight   (!) 175/101 98.3 °F (36.8 °C) Oral (!) 101 18 95 % -- --       Physical Exam  Vitals and nursing note reviewed. Constitutional:       Appearance: He is well-developed. He is not diaphoretic. HENT:      Head: Normocephalic and atraumatic. Eyes:      General:         Right eye: No discharge. Left eye: No discharge. Pulmonary:      Effort: Pulmonary effort is normal. No respiratory distress. Musculoskeletal:         General: Normal range of motion. Left hand: Swelling present. Cervical back: Normal range of motion and neck supple. Comments: On examination of patient's left hand there is an area that looks like a subacute bee sting on the volar aspect of his hand between the first and second MCP joints. There is very mild swelling in this area. There is no erythema, induration, warmth or drainage. On close inspection, I do not feel there is a stinger in place. His left hand is neurovascularly intact. Skin:     General: Skin is warm and dry. Coloration: Skin is not pale. Neurological:      Mental Status: He is alert and oriented to person, place, and time.    Psychiatric:         Behavior: Behavior normal. DIAGNOSTIC RESULTS   LABS:    Labs Reviewed - No data to display    When ordered only abnormal lab results are displayed. All other labs were within normal range or not returned as of this dictation. EKG: When ordered, EKG's are interpreted by the Emergency Department Physician in the absence of a cardiologist.  Please see their note for interpretation of EKG. RADIOLOGY:   Non-plain film images such as CT, Ultrasound and MRI are read by the radiologist. Plain radiographic images are visualized and preliminarily interpreted by the ED Provider with the below findings:        Interpretation per the Radiologist below, if available at the time of this note:    No orders to display     No results found. No results found. PROCEDURES   Unless otherwise noted below, none     Procedures    CRITICAL CARE TIME (.cctime)       PAST MEDICAL HISTORY      has a past medical history of Anxiety, BPH (benign prostatic hyperplasia), CAD (coronary artery disease) (10/25/2013), Chronic back pain (oct 2008), DDD (degenerative disc disease), lumbosacral (1/7/2013), Depression, Eosinophilic granuloma ([facility] [address] Epilepsy ([facility] [address] Hyperlipidemia, Hypertension, Irritable bowel syndrome, Myofascial pain syndrome (1/7/2013), Obstructive sleep apnea (adult) (pediatric) (10/9/2014), and Type II or unspecified type diabetes mellitus without mention of complication, not stated as uncontrolled. Chronic Conditions affecting Care: None    EMERGENCY DEPARTMENT COURSE and DIFFERENTIAL DIAGNOSIS/MDM:   Vitals:    Vitals:    01/06/23 1421   BP: (!) 175/101   Pulse: (!) 101   Resp: 18   Temp: 98.3 °F (36.8 °C)   TempSrc: Oral   SpO2: 95%       Patient was given the following medications:  Medications - No data to display          Is this patient to be included in the SEP-1 Core Measure due to severe sepsis or septic shock? No   Exclusion criteria - the patient is NOT to be included for SEP-1 Core Measure due to:   Infection is not suspected    CONSULTS: (Who and What was discussed)  None  Discussion with Other Profesionals : None    Social Determinants : None    Records Reviewed : None    CC/HPI Summary, DDx, ED Course, and Reassessment: Patient presented to the emergency department today stating he was stung by a bee on the left hand on Wednesday. He presented today to make sure there is still a stinger not still present. On exam.  I do not see a stinger. There is very mild swelling. There is no erythema, induration, warmth or drainage. His left hand is neurovascularly intact. There is no sign of an allergic reaction. Disposition Considerations (include 1 Tests not done, Shared Decision Making, Pt Expectation of Test or Tx.): Patient will be discharged with a Medrol Dosepak and Vistaril. He is agreeable with discharge and outpatient follow-up with his PCP. He states he will call today to schedule a follow-up visit. Patient is hemodynamically stable. There is no sign of allergic reaction    I estimate there is LOW risk for ANAPHYLAXIS, MAYA-ASHLIE SYNDROME, OR TOXIC EPIDERMAL NECROLYSIS thus I consider the discharge disposition reasonable. Appropriate for outpatient management        I am the Primary Clinician of Record. FINAL IMPRESSION      1.  Sting, bee, accidental or unintentional, initial encounter          DISPOSITION/PLAN     DISPOSITION Decision To Discharge 01/06/2023 02:46:43 PM      PATIENT REFERRED TO:  Carol Michaud, [address] [facility] [address] [place] [address],[address] [facility]  [phone]    Schedule an appointment as soon as possible for a visit       DISCHARGE MEDICATIONS:  Discharge Medication List as of 1/6/2023  2:48 PM        START taking these medications    Details   methylPREDNISolone (MEDROL, NOREEN,) 4 MG tablet By mouth., Disp-1 kit, R-0Normal      hydrOXYzine pamoate (VISTARIL) 25 MG capsule Take 1-2 capsules by mouth 3 times daily as needed for Itching, Disp-30 capsule, R-0Normal             DISCONTINUED MEDICATIONS:  Discharge Medication List as of 1/6/2023  2:48 PM                 (Please note that portions of this note were completed with a voice recognition program.  Efforts were made to edit the dictations but occasionally words are mis-transcribed.)    Kathy Lopez PA-C (electronically signed)           Kathy Lopez PA-C  01/06/23 1826

## 2023-02-14 ENCOUNTER — OFFICE VISIT (OUTPATIENT)
Dept: PULMONOLOGY | Age: 73
End: 2023-02-14
Payer: MEDICARE

## 2023-02-14 VITALS
SYSTOLIC BLOOD PRESSURE: 122 MMHG | WEIGHT: 208.2 LBS | TEMPERATURE: 98.3 F | HEIGHT: 71 IN | DIASTOLIC BLOOD PRESSURE: 78 MMHG | HEART RATE: 78 BPM | OXYGEN SATURATION: 97 % | BODY MASS INDEX: 29.15 KG/M2

## 2023-02-14 DIAGNOSIS — I10 ESSENTIAL HYPERTENSION: Chronic | ICD-10-CM

## 2023-02-14 DIAGNOSIS — E11.40 TYPE 2 DIABETES MELLITUS WITH DIABETIC NEUROPATHY, UNSPECIFIED WHETHER LONG TERM INSULIN USE (HCC): Chronic | ICD-10-CM

## 2023-02-14 DIAGNOSIS — I25.10 CORONARY ARTERY DISEASE DUE TO LIPID RICH PLAQUE: Chronic | ICD-10-CM

## 2023-02-14 DIAGNOSIS — G47.33 OBSTRUCTIVE SLEEP APNEA: Chronic | ICD-10-CM

## 2023-02-14 DIAGNOSIS — I25.83 CORONARY ARTERY DISEASE DUE TO LIPID RICH PLAQUE: Chronic | ICD-10-CM

## 2023-02-14 PROCEDURE — 99214 OFFICE O/P EST MOD 30 MIN: CPT | Performed by: INTERNAL MEDICINE

## 2023-02-14 PROCEDURE — 3078F DIAST BP <80 MM HG: CPT | Performed by: INTERNAL MEDICINE

## 2023-02-14 PROCEDURE — 3074F SYST BP LT 130 MM HG: CPT | Performed by: INTERNAL MEDICINE

## 2023-02-14 PROCEDURE — 1123F ACP DISCUSS/DSCN MKR DOCD: CPT | Performed by: INTERNAL MEDICINE

## 2023-02-14 ASSESSMENT — SLEEP AND FATIGUE QUESTIONNAIRES
ESS TOTAL SCORE: 5
HOW LIKELY ARE YOU TO NOD OFF OR FALL ASLEEP WHILE SITTING AND READING: 1
HOW LIKELY ARE YOU TO NOD OFF OR FALL ASLEEP IN A CAR, WHILE STOPPED FOR A FEW MINUTES IN TRAFFIC: 0
HOW LIKELY ARE YOU TO NOD OFF OR FALL ASLEEP WHILE LYING DOWN TO REST IN THE AFTERNOON WHEN CIRCUMSTANCES PERMIT: 2
HOW LIKELY ARE YOU TO NOD OFF OR FALL ASLEEP WHEN YOU ARE A PASSENGER IN A CAR FOR AN HOUR WITHOUT A BREAK: 0
HOW LIKELY ARE YOU TO NOD OFF OR FALL ASLEEP WHILE SITTING INACTIVE IN A PUBLIC PLACE: 0
HOW LIKELY ARE YOU TO NOD OFF OR FALL ASLEEP WHILE SITTING QUIETLY AFTER LUNCH WITHOUT ALCOHOL: 2
HOW LIKELY ARE YOU TO NOD OFF OR FALL ASLEEP WHILE WATCHING TV: 0
HOW LIKELY ARE YOU TO NOD OFF OR FALL ASLEEP WHILE SITTING AND TALKING TO SOMEONE: 0

## 2023-02-14 NOTE — PROGRESS NOTES
Quincy Seymour CNP  Mary Carmen Gar CNP [place]  [address]  [address]  [place]  [address]  P- [phone]   [place]  [address]  [address]  [place] [address]  F- [phone]     [facility]  [facility] SLEEP MEDICINE  [address] [facility] [address] [phone]      Assessment/Plan:      1. Obstructive sleep apnea  Assessment & Plan:  Chronic-not Stable: Reviewed and analyzed results of physiologic download from patient's machine and reviewed with patient.  Supplies and parts as needed for his machine.  These are medically necessary.  Limit caffeine use after 3pm. Based on the analyzed data will have the patient work with his [employer] company on trying a new mask style to sleep and control the mask leak and then reassess how his sleep pattern is going.  Discussed try to limit the napping during the daytime to see if it helps consolidate his sleep at nighttime.  2. Coronary artery disease due to lipid rich plaque  Assessment & Plan:  Chronic- Stable.  Discussed the importance of treating sleep apnea as part of the management of this disorder.  Cont any meds per PCP and other physicians.   3. Essential hypertension  Assessment & Plan:  Chronic- Stable.  Discussed the importance of treating sleep apnea as part of the management of this disorder.  Cont any meds per PCP and other physicians.   4. Type 2 diabetes mellitus with diabetic neuropathy, unspecified whether long term insulin use (HCC)  Assessment & Plan:  Chronic- Stable.  Discussed the importance of treating sleep apnea as part of the management of this disorder.  Cont any meds per PCP and other physicians.     Reviewed, analyzed, and documented physiologic data from patient's PAP machine.    Encouraged to quit tobacco in all forms, discussed different techniques to quit.    This information was analyzed to assess complexity and medical decision making  in regards to further testing and management. Diagnoses of Obstructive sleep apnea, Coronary artery disease due to lipid rich plaque, Essential hypertension, and Type 2 diabetes mellitus with diabetic neuropathy, unspecified whether long term insulin use ([facility] were pertinent to this visit. The chronic medical conditions listed are directly related to the primary diagnosis listed above. The management of the primary diagnosis affects the secondary diagnosis and vice versa. Subjective:   Subjective   Patient ID: Elio Moise is a 71 y.o. male. Chief Complaint   Patient presents with    Sleep Apnea       HPI:  Machine Modem/Download Info:  Compliance (hours/night): 5.25 hrs/night  % of nights >= 4 hrs: 70 %  Download AHI (/hour): 2.6 /HR  Average CPAP Pressure : 16.1 cmH2O     APAP - Settings  Pressure Min: 13 cmH2O  Pressure Max: 20 cmH2O                 Comfort Settings  Flex/EPR (0-3): 3       Patient feels he is doing well with his machine and the pressure feels good but the download shows a very high leak. The patient often wakes up in middle night to urinate and cannot go back to bed and only gets about 4 to 5 hours of sleep and that he is tired in the daytime will nap for 2 hours plus during the day. He has difficulty initiating sleep at times. He has been on the same style mask for several years.     [facility]    [place] Sleepiness Score: 5    Social History     Socioeconomic History    Marital status: Single     Spouse name: Not on file    Number of children: Not on file    Years of education: Not on file    Highest education level: Not on file   Occupational History    Not on file   Tobacco Use    Smoking status: Some Days     Packs/day: 0.50     Years: 10.00     Pack years: 5.00     Types: Cigarettes     Last attempt to quit: 2017     Years since quittin.7    Smokeless tobacco: Never   Vaping Use    Vaping Use: Never used   Substance and Sexual Activity    Alcohol use: No    Drug use: No    Sexual activity: Yes     Partners: Female   Other Topics Concern    Not on file   Social History Narrative    Not on file     Social Determinants of Health     Financial Resource Strain: Not on file   Food Insecurity: Not on file   Transportation Needs: Not on file   Physical Activity: Not on file   Stress: Not on file   Social Connections: Not on file   Intimate Partner Violence: Not on file   Housing Stability: Not on file       Current Outpatient Medications   Medication Instructions    aspirin EC 81 mg, Oral, DAILY    atorvastatin (LIPITOR) 80 MG tablet TAKE 1 TABLET BY MOUTH EVERY DAY    Blood Pressure Monitoring (BLOOD PRESSURE CUFF) MISC 1 Units, Does not apply, DAILY    CPAP Machine MISC Does not apply    diphenoxylate-atropine (LOMOTIL) 2.5-0.025 MG per tablet 1 tid    divalproex (DEPAKOTE ER) 500 MG extended release tablet TAKE 3 TABLETS BY MOUTH EVERY DAY    FLUoxetine (PROZAC) 20 mg, Oral, DAILY    hydrochlorothiazide (HYDRODIURIL) 25 MG tablet TAKE 1 TABLET BY MOUTH EVERY DAY    lisinopril (PRINIVIL;ZESTRIL) 20 MG tablet TAKE 1 TABLET BY MOUTH DAILY    METFORMIN HCL PO 1,000 mg, Oral, 2 TIMES DAILY    methylPREDNISolone (MEDROL, NOREEN,) 4 MG tablet By mouth.    metoprolol tartrate (LOPRESSOR) 25 MG tablet TAKE 1 TABLET BY MOUTH 2 TIMES DAILY    omeprazole (PRILOSEC) 20 mg, Oral, DAILY    potassium chloride (KLOR-CON) 10 MEQ extended release tablet TAKE 1 TABLET BY MOUTH EVERY DAY    REPATHA SURECLICK 866 MG/ML SOAJ INJECT 1 PEN SC EVERY 2 WEEKS    tamsulosin (FLOMAX) 0.4 MG capsule TAKE ONE CAPSULE BY MOUTH EVERY DAY IN THE EVENING    triamcinolone (KENALOG) 0.1 % cream No dose, route, or frequency recorded. VASCEPA 1 g CAPS capsule TAKE 2 CAPSULES BY MOUTH TWICE A DAY WITH FOOD          Vitals:  Weight BMI   Wt Readings from Last 3 Encounters:   02/14/23 208 lb 3.2 oz (94.4 kg)   12/12/22 217 lb (98.4 kg)   06/20/22 223 lb (101.2 kg)    Body mass index is 29.2 kg/m².      BP HR SaO2   BP Readings from Last 3 Encounters:   02/14/23 122/78   01/06/23 (!) 175/101   12/12/22 (!) 161/67    Pulse Readings from Last 3 Encounters:   02/14/23 78   01/06/23 (!) 101   12/12/22 94    SpO2 Readings from Last 3 Encounters:   02/14/23 97%   01/06/23 95%   11/05/21 95%        Electronically signed by Jane Harry MD on 2/14/2023 at 1:24 PM

## 2023-02-14 NOTE — PROGRESS NOTES
[facility]         : 1950    Diagnosis: [x] IFEANYI (G47.33) [] CSA (G47.31) [] Apnea (G47.30)   Length of Need: [x] 18 Months [] 99 Months [] Other:    Machine (JOSIE!): [] Respironics Dream Station      Auto [] ResMed AirSense     Auto [] Other:     []  CPAP () [] Bilevel ()   Mode: [] Auto [] Spontaneous    Mode: [] Auto [] Spontaneous              Comfort Settings:        Humidifier: [] Heated ()        [x] Water chamber replacement ()/ 1 per 6 months        Mask:   [] Nasal () /1 per 3 months [x] Full Face () /1 per 3 months   [] Patient choice -Size and fit mask [x] Patient Choice - Size and fit mask   [] Dispense:  [] Dispense:    [] Headgear () / 1 per 3 months [x] Headgear () / 1 per 3 months   [] Replacement Nasal Cushion ()/2 per month [x] Interface Replacement ()/1 per month   [] Replacement Nasal Pillows ()/2 per month         Tubing: [x] Heated ()/1 per 3 months    [] Standard ()/1 per 3 months [] Other:           Filters: [x] Non-disposable ()/1 per 6 months     [x] Ultra-Fine, Disposable ()/2 per month        Miscellaneous: [] Chin Strap ()/ 1 per 6 months [] O2 bleed-in:       LPM   [] Oximetry on CPAP/Bilevel []  Other:    [x] Modem: ()         Start Order Date: 23    MEDICAL JUSTIFICATION:  I, the undersigned, certify that the above prescribed supplies are medically necessary for this patients wellbeing. In my opinion, the supplies are both reasonable and necessary in reference to accepted standards of medicalpractice in treatment of this patients condition.     Elisha Jackman MD      NPI: [id number]       Order Signed Date: 23    Electronically signed by Elisha Jackman MD on 2023 at 1:24 PM    [facility]  1950  [address]  [phone] (home)   [phone] (mobile)      Insurance Info (confirm with patient if correct):  Payer/Plan Subscr  Sex Relation Sub.  Ins. ID Effective Group Num

## 2023-02-14 NOTE — ASSESSMENT & PLAN NOTE
Chronic-not Stable: Reviewed and analyzed results of physiologic download from patient's machine and reviewed with patient. Supplies and parts as needed for his machine. These are medically necessary. Limit caffeine use after 3pm. Based on the analyzed data will have the patient work with his [employer] company on trying a new mask style to sleep and control the mask leak and then reassess how his sleep pattern is going. Discussed try to limit the napping during the daytime to see if it helps consolidate his sleep at nighttime.

## 2023-02-16 RX ORDER — EVOLOCUMAB 140 MG/ML
INJECTION, SOLUTION SUBCUTANEOUS
Qty: 2 ML | OUTPATIENT
Start: 2023-02-16

## 2023-02-19 ENCOUNTER — APPOINTMENT (OUTPATIENT)
Dept: GENERAL RADIOLOGY | Age: 73
End: 2023-02-19
Payer: MEDICARE

## 2023-02-19 ENCOUNTER — HOSPITAL ENCOUNTER (EMERGENCY)
Age: 73
Discharge: HOME | End: 2023-02-19
Payer: MEDICARE

## 2023-02-19 VITALS
HEART RATE: 74 BPM | OXYGEN SATURATION: 96 % | HEIGHT: 69 IN | DIASTOLIC BLOOD PRESSURE: 70 MMHG | WEIGHT: 212 LBS | TEMPERATURE: 98.8 F | BODY MASS INDEX: 31.4 KG/M2 | RESPIRATION RATE: 18 BRPM | SYSTOLIC BLOOD PRESSURE: 152 MMHG

## 2023-02-19 DIAGNOSIS — R06.2 WHEEZING: ICD-10-CM

## 2023-02-19 DIAGNOSIS — J06.9 URI WITH COUGH AND CONGESTION: Primary | ICD-10-CM

## 2023-02-19 LAB
A/G RATIO: 1.4 (ref 1.1–2.2)
ALBUMIN SERPL-MCNC: 4 G/DL (ref 3.4–5)
ALP BLD-CCNC: 92 U/L (ref 40–129)
ALT SERPL-CCNC: 10 U/L (ref 10–40)
ANION GAP SERPL CALCULATED.3IONS-SCNC: 7 MMOL/L (ref 3–16)
AST SERPL-CCNC: 11 U/L (ref 15–37)
BACTERIA: NORMAL /HPF
BASOPHILS ABSOLUTE: 0.2 K/UL (ref 0–0.2)
BASOPHILS RELATIVE PERCENT: 3.6 %
BILIRUB SERPL-MCNC: <0.2 MG/DL (ref 0–1)
BILIRUBIN URINE: NEGATIVE
BLOOD, URINE: NEGATIVE
BUN BLDV-MCNC: 10 MG/DL (ref 7–20)
CALCIUM SERPL-MCNC: 9 MG/DL (ref 8.3–10.6)
CHLORIDE BLD-SCNC: 102 MMOL/L (ref 99–110)
CLARITY: CLEAR
CO2: 28 MMOL/L (ref 21–32)
COLOR: YELLOW
CREAT SERPL-MCNC: 0.9 MG/DL (ref 0.8–1.3)
EOSINOPHILS ABSOLUTE: 0.1 K/UL (ref 0–0.6)
EOSINOPHILS RELATIVE PERCENT: 2 %
EPITHELIAL CELLS, UA: 0 /HPF (ref 0–5)
GFR SERPL CREATININE-BSD FRML MDRD: >60 ML/MIN/{1.73_M2}
GLUCOSE BLD-MCNC: 187 MG/DL (ref 70–99)
GLUCOSE URINE: 100 MG/DL
HCT VFR BLD CALC: 45.7 % (ref 40.5–52.5)
HEMOGLOBIN: 15.4 G/DL (ref 13.5–17.5)
HYALINE CASTS: 0 /LPF (ref 0–8)
KETONES, URINE: ABNORMAL MG/DL
LEUKOCYTE ESTERASE, URINE: NEGATIVE
LYMPHOCYTES ABSOLUTE: 1.6 K/UL (ref 1–5.1)
LYMPHOCYTES RELATIVE PERCENT: 25.3 %
MCH RBC QN AUTO: 29.8 PG (ref 26–34)
MCHC RBC AUTO-ENTMCNC: 33.8 G/DL (ref 31–36)
MCV RBC AUTO: 88.3 FL (ref 80–100)
MICROSCOPIC EXAMINATION: YES
MONOCYTES ABSOLUTE: 0.5 K/UL (ref 0–1.3)
MONOCYTES RELATIVE PERCENT: 7.8 %
NEUTROPHILS ABSOLUTE: 3.9 K/UL (ref 1.7–7.7)
NEUTROPHILS RELATIVE PERCENT: 61.3 %
NITRITE, URINE: NEGATIVE
PDW BLD-RTO: 13.5 % (ref 12.4–15.4)
PH UA: 6.5 (ref 5–8)
PLATELET # BLD: 207 K/UL (ref 135–450)
PMV BLD AUTO: 9.4 FL (ref 5–10.5)
POTASSIUM SERPL-SCNC: 4.1 MMOL/L (ref 3.5–5.1)
PRO-BNP: 119 PG/ML (ref 0–124)
PROTEIN UA: 30 MG/DL
RAPID INFLUENZA  B AGN: NEGATIVE
RAPID INFLUENZA A AGN: NEGATIVE
RBC # BLD: 5.18 M/UL (ref 4.2–5.9)
RBC UA: 0 /HPF (ref 0–4)
SARS-COV-2, NAAT: NOT DETECTED
SODIUM BLD-SCNC: 137 MMOL/L (ref 136–145)
SPECIFIC GRAVITY UA: 1.01 (ref 1–1.03)
TOTAL PROTEIN: 6.8 G/DL (ref 6.4–8.2)
TROPONIN: <0.01 NG/ML
URINE REFLEX TO CULTURE: ABNORMAL
URINE TYPE: ABNORMAL
UROBILINOGEN, URINE: 1 E.U./DL
WBC # BLD: 6.4 K/UL (ref 4–11)
WBC UA: 0 /HPF (ref 0–5)

## 2023-02-19 PROCEDURE — 93005 ELECTROCARDIOGRAM TRACING: CPT | Performed by: PHYSICIAN ASSISTANT

## 2023-02-19 PROCEDURE — 6360000002 HC RX W HCPCS: Performed by: PHYSICIAN ASSISTANT

## 2023-02-19 PROCEDURE — 83880 ASSAY OF NATRIURETIC PEPTIDE: CPT

## 2023-02-19 PROCEDURE — 94760 N-INVAS EAR/PLS OXIMETRY 1: CPT

## 2023-02-19 PROCEDURE — 80053 COMPREHEN METABOLIC PANEL: CPT

## 2023-02-19 PROCEDURE — 81001 URINALYSIS AUTO W/SCOPE: CPT

## 2023-02-19 PROCEDURE — 71045 X-RAY EXAM CHEST 1 VIEW: CPT

## 2023-02-19 PROCEDURE — 99285 EMERGENCY DEPT VISIT HI MDM: CPT

## 2023-02-19 PROCEDURE — 87635 SARS-COV-2 COVID-19 AMP PRB: CPT

## 2023-02-19 PROCEDURE — 84484 ASSAY OF TROPONIN QUANT: CPT

## 2023-02-19 PROCEDURE — 85025 COMPLETE CBC W/AUTO DIFF WBC: CPT

## 2023-02-19 PROCEDURE — 94640 AIRWAY INHALATION TREATMENT: CPT

## 2023-02-19 PROCEDURE — 87804 INFLUENZA ASSAY W/OPTIC: CPT

## 2023-02-19 PROCEDURE — 96374 THER/PROPH/DIAG INJ IV PUSH: CPT

## 2023-02-19 PROCEDURE — 6370000000 HC RX 637 (ALT 250 FOR IP): Performed by: PHYSICIAN ASSISTANT

## 2023-02-19 RX ORDER — IPRATROPIUM BROMIDE AND ALBUTEROL SULFATE 2.5; .5 MG/3ML; MG/3ML
1 SOLUTION RESPIRATORY (INHALATION) ONCE
Status: COMPLETED | OUTPATIENT
Start: 2023-02-19 | End: 2023-02-19

## 2023-02-19 RX ORDER — PREDNISONE 10 MG/1
60 TABLET ORAL DAILY
Qty: 30 TABLET | Refills: 0 | Status: SHIPPED | OUTPATIENT
Start: 2023-02-19 | End: 2023-02-24

## 2023-02-19 RX ORDER — ALBUTEROL SULFATE 90 UG/1
AEROSOL, METERED RESPIRATORY (INHALATION)
Qty: 18 G | Refills: 0 | Status: SHIPPED | OUTPATIENT
Start: 2023-02-19 | End: 2023-02-19 | Stop reason: SDUPTHER

## 2023-02-19 RX ORDER — LEVOFLOXACIN 750 MG/1
750 TABLET ORAL DAILY
Qty: 7 TABLET | Refills: 0 | Status: SHIPPED | OUTPATIENT
Start: 2023-02-19 | End: 2023-02-19 | Stop reason: SDUPTHER

## 2023-02-19 RX ORDER — PREDNISONE 10 MG/1
60 TABLET ORAL DAILY
Qty: 30 TABLET | Refills: 0 | Status: SHIPPED | OUTPATIENT
Start: 2023-02-19 | End: 2023-02-19 | Stop reason: SDUPTHER

## 2023-02-19 RX ORDER — METHYLPREDNISOLONE SODIUM SUCCINATE 125 MG/2ML
125 INJECTION, POWDER, LYOPHILIZED, FOR SOLUTION INTRAMUSCULAR; INTRAVENOUS ONCE
Status: COMPLETED | OUTPATIENT
Start: 2023-02-19 | End: 2023-02-19

## 2023-02-19 RX ORDER — ALBUTEROL SULFATE 90 UG/1
AEROSOL, METERED RESPIRATORY (INHALATION)
Qty: 18 G | Refills: 0 | Status: SHIPPED | OUTPATIENT
Start: 2023-02-19

## 2023-02-19 RX ORDER — LEVOFLOXACIN 750 MG/1
750 TABLET ORAL DAILY
Qty: 7 TABLET | Refills: 0 | Status: SHIPPED | OUTPATIENT
Start: 2023-02-19 | End: 2023-02-26

## 2023-02-19 RX ADMIN — IPRATROPIUM BROMIDE AND ALBUTEROL SULFATE 1 AMPULE: 2.5; .5 SOLUTION RESPIRATORY (INHALATION) at 18:03

## 2023-02-19 RX ADMIN — METHYLPREDNISOLONE SODIUM SUCCINATE 125 MG: 125 INJECTION, POWDER, FOR SOLUTION INTRAMUSCULAR; INTRAVENOUS at 17:31

## 2023-02-19 RX ADMIN — ALBUTEROL SULFATE 5 MG: 2.5 SOLUTION RESPIRATORY (INHALATION) at 18:03

## 2023-02-19 ASSESSMENT — ENCOUNTER SYMPTOMS
TROUBLE SWALLOWING: 0
EYE DISCHARGE: 0
SINUS PAIN: 0
CHOKING: 0
DIARRHEA: 0
VOICE CHANGE: 0
ABDOMINAL PAIN: 0
SINUS PRESSURE: 1
SHORTNESS OF BREATH: 1
CONSTIPATION: 0
NAUSEA: 0
BACK PAIN: 0
COLOR CHANGE: 0
COUGH: 1
APNEA: 0
VOMITING: 0
CHEST TIGHTNESS: 0
EYE REDNESS: 0
EYE ITCHING: 0
RHINORRHEA: 0
EYE PAIN: 0
SORE THROAT: 0
STRIDOR: 0
WHEEZING: 1

## 2023-02-19 ASSESSMENT — PAIN DESCRIPTION - ORIENTATION: ORIENTATION: UPPER

## 2023-02-19 ASSESSMENT — PAIN SCALES - GENERAL: PAINLEVEL_OUTOF10: 5

## 2023-02-19 ASSESSMENT — PAIN DESCRIPTION - LOCATION: LOCATION: BACK

## 2023-02-19 ASSESSMENT — LIFESTYLE VARIABLES: HOW OFTEN DO YOU HAVE A DRINK CONTAINING ALCOHOL: NEVER

## 2023-02-19 ASSESSMENT — PAIN - FUNCTIONAL ASSESSMENT: PAIN_FUNCTIONAL_ASSESSMENT: 0-10

## 2023-02-19 NOTE — ED NOTES
Ambulatory walking around half of the nurse's station - SpO2 was 95-96%. HR: 103-107. Patient felt a bit of SOB residential through the walk.      Romi Siegel  02/19/23 8770

## 2023-02-20 LAB
EKG ATRIAL RATE: 75 BPM
EKG DIAGNOSIS: NORMAL
EKG P AXIS: 29 DEGREES
EKG P-R INTERVAL: 158 MS
EKG Q-T INTERVAL: 394 MS
EKG QRS DURATION: 104 MS
EKG QTC CALCULATION (BAZETT): 439 MS
EKG R AXIS: -40 DEGREES
EKG T AXIS: 68 DEGREES
EKG VENTRICULAR RATE: 75 BPM

## 2023-02-20 PROCEDURE — 93010 ELECTROCARDIOGRAM REPORT: CPT | Performed by: INTERNAL MEDICINE

## 2023-02-20 NOTE — ED NOTES
Discharge instructions  reviewed with pt. Pt allowed opportunity to ask questions and verbalized understanding.         Efren Hernández RN  02/19/23 8703

## 2023-02-20 NOTE — ED PROVIDER NOTES
Unless otherwise noted I was not involved in the care of this patient. I have reviewed the EKG personally per protocol. EKG: normal sinus rhythm, rate is 75, Left axis deviation, no acute ST-T wave changes.        Yulisa West 8, DO  02/19/23 1900

## 2023-02-20 NOTE — ED PROVIDER NOTES
[address] [facility]        Pt Name: Dianne Shabazz  MRN: [id number]  [place] 1950  Date of evaluation: 2/19/2023  Provider: SHERRIE Corea  PCP: Marty Larson MD  Note Started: 8:34 PM EST 2/19/23      SAMMY. I have evaluated this patient. My supervising physician was available for consultation. CHIEF COMPLAINT       Chief Complaint   Patient presents with    Cough     Pt states he was treated for a sinus infection recently and thought it was gone. Now having the same symptoms and feels weak. HISTORY OF PRESENT ILLNESS: 1 or more Elements     History From: Patient  Limitations to history : None    Dianne Shabazz is a 71 y.o. male with past medical history of diabetes, hypertension, CAD, obstructive sleep apnea, GERD, IBS, diabetes and reported tobacco use who presents ED with complaint of cough and shortness of breath. Patient states he believes he has asthma. Unsure if he was ever diagnosed with COPD. He states he is a long-term smoker. He states for the past 1 to 2 weeks he has had nonproductive cough with associated sinus congestion and postnasal drainage. Patient states he thought he had a sinus infection and tried increasing fluids and felt like symptoms improved but he states over the past week they have gotten worse. He states he feels like he has worsening cough and wheezing and some shortness of breath. He denies any chest pain or chest tightness. Denies any pleuritic pain, hemoptysis, orthopnea, pedal edema or calf tenderness. Denies abdominal pain, nausea/vomiting, urinary symptoms or changes in bowel movements. Denies fever or chills. Denies any sick contacts or recent travel. Nursing Notes were all reviewed and agreed with or any disagreements were addressed in the HPI.     REVIEW OF SYSTEMS :      Review of Systems   Constitutional:  Negative for activity change, appetite change, chills, diaphoresis, fatigue and fever. HENT:  Positive for congestion, postnasal drip and sinus pressure. Negative for ear discharge, ear pain, rhinorrhea, sinus pain, sore throat, trouble swallowing and voice change. Eyes:  Negative for pain, discharge, redness and itching. Respiratory:  Positive for cough, shortness of breath and wheezing. Negative for apnea, choking, chest tightness and stridor. Cardiovascular: Negative. Negative for chest pain, palpitations and leg swelling. Gastrointestinal:  Negative for abdominal pain, constipation, diarrhea, nausea and vomiting. Genitourinary:  Negative for decreased urine volume, difficulty urinating, dysuria, flank pain, frequency, hematuria and urgency. Musculoskeletal:  Negative for arthralgias, back pain, myalgias, neck pain and neck stiffness. Skin:  Negative for color change, pallor, rash and wound. Neurological:  Negative for dizziness, light-headedness and headaches. Positives and Pertinent negatives as per HPI. SURGICAL HISTORY     Past Surgical History:   Procedure Laterality Date    CORONARY ANGIOPLASTY WITH STENT PLACEMENT  10/2013    ELBOW BURSA SURGERY Left 7/24/2014    EXCISION OF OLECRANON BURSA, LEFT ELBOW    LUNG BIOPSY         CURRENTMEDICATIONS       Discharge Medication List as of 2/19/2023  7:01 PM        CONTINUE these medications which have NOT CHANGED    Details   methylPREDNISolone (MEDROL, NOREEN,) 4 MG tablet By mouth., Disp-1 kit, R-0Normal      divalproex (DEPAKOTE ER) 500 MG extended release tablet TAKE 3 TABLETS BY MOUTH EVERY DAY, Disp-270 tablet, R-1Normal      triamcinolone (KENALOG) 0.1 % cream Historical Med      CPAP Machine MISC Historical Med      atorvastatin (LIPITOR) 80 MG tablet TAKE 1 TABLET BY MOUTH EVERY DAY, Disp-30 tablet, R-1Pt needs labs for future refills. Normal      METFORMIN HCL PO Take 1,000 mg by mouth 2 times daily Historical Med      REPATHA SURECLICK 154 MG/ML SOAJ INJECT 1 PEN SC EVERY 2 WEEKS, R-11, DAWHistorical Med      VASCEPA 1 g CAPS capsule TAKE 2 CAPSULES BY MOUTH TWICE A DAY WITH FOOD, R-2, DAWHistorical Med      potassium chloride (KLOR-CON) 10 MEQ extended release tablet TAKE 1 TABLET BY MOUTH EVERY DAY, R-0Historical Med      aspirin EC 81 MG EC tablet Take 1 tablet by mouth daily, Disp-90 tablet, R-3OTC      hydrochlorothiazide (HYDRODIURIL) 25 MG tablet TAKE 1 TABLET BY MOUTH EVERY DAY, R-2Historical Med      metoprolol tartrate (LOPRESSOR) 25 MG tablet TAKE 1 TABLET BY MOUTH 2 TIMES DAILY, Disp-180 tablet, R-1Normal      omeprazole (PRILOSEC) 20 MG delayed release capsule Take 20 mg by mouth dailyHistorical Med      lisinopril (PRINIVIL;ZESTRIL) 20 MG tablet TAKE 1 TABLET BY MOUTH DAILY, Disp-90 tablet, R-3Normal      Blood Pressure Monitoring (BLOOD PRESSURE CUFF) MISC DAILY Starting 2017, Until Discontinued, Disp-1 each, R-0, Normal      FLUoxetine (PROZAC) 20 MG capsule Take 1 capsule by mouth daily, Disp-30 capsule, R-2      tamsulosin (FLOMAX) 0.4 MG capsule TAKE ONE CAPSULE BY MOUTH EVERY DAY IN THE EVENING, Disp-30 capsule, R-2      diphenoxylate-atropine (LOMOTIL) 2.5-0.025 MG per tablet 1 tid, Disp-90 tablet, R-0             ALLERGIES     Penicillins, Benadryl [diphenhydramine hcl], Erythromycin, and Iodine    FAMILYHISTORY       Family History   Problem Relation Age of Onset    High Blood Pressure Mother     Diabetes Father     Heart Disease Father     High Blood Pressure Father         SOCIAL HISTORY       Social History     Tobacco Use    Smoking status: Some Days     Packs/day: 0.50     Years: 10.00     Pack years: 5.00     Types: Cigarettes     Last attempt to quit: 2017     Years since quittin.7    Smokeless tobacco: Never   Vaping Use    Vaping Use: Never used   Substance Use Topics    Alcohol use: No    Drug use: No       SCREENINGS                         [facility] Assessment  BP: (!) 152/70  Heart Rate: 74           PHYSICAL EXAM  1 or more Elements     ED Triage Vitals [02/19/23 1616]   BP Temp Temp Source Heart Rate Resp SpO2 Height Weight   (!) 190/71 98.8 °F (37.1 °C) Oral 81 16 93 % 5' 9\" (1.753 m) 212 lb (96.2 kg)       Physical Exam  Constitutional:       General: He is not in acute distress. Appearance: Normal appearance. He is well-developed. He is not ill-appearing, toxic-appearing or diaphoretic. HENT:      Head: Normocephalic and atraumatic. Right Ear: Tympanic membrane, ear canal and external ear normal. There is no impacted cerumen. Left Ear: Tympanic membrane, ear canal and external ear normal. There is no impacted cerumen. Nose: Congestion present. No rhinorrhea. Mouth/Throat:      Mouth: Mucous membranes are moist.      Pharynx: No oropharyngeal exudate or posterior oropharyngeal erythema. Eyes:      General:         Right eye: No discharge. Left eye: No discharge. Conjunctiva/sclera: Conjunctivae normal.   Cardiovascular:      Rate and Rhythm: Normal rate and regular rhythm. Pulses: Normal pulses. Heart sounds: Normal heart sounds. No murmur heard. No friction rub. No gallop. Comments: 2+ radial pulses bilaterally. No pedal edema. No calf tenderness. No JVD. Pulmonary:      Effort: Pulmonary effort is normal. No respiratory distress. Breath sounds: No stridor. Wheezing and rhonchi present. No rales. Chest:      Chest wall: No tenderness. Abdominal:      General: Abdomen is flat. Bowel sounds are normal. There is no distension. Palpations: Abdomen is soft. There is no mass. Tenderness: There is no abdominal tenderness. There is no right CVA tenderness, left CVA tenderness, guarding or rebound. Hernia: No hernia is present. Musculoskeletal:         General: Normal range of motion. Cervical back: Normal range of motion and neck supple. No rigidity or tenderness. Lymphadenopathy:      Cervical: No cervical adenopathy. Skin:     General: Skin is warm and dry. Coloration: Skin is not pale. Findings: No erythema or rash. Neurological:      Mental Status: He is alert and oriented to person, place, and time. Psychiatric:         Behavior: Behavior normal.           DIAGNOSTIC RESULTS   LABS:    Labs Reviewed   COMPREHENSIVE METABOLIC PANEL - Abnormal; Notable for the following components:       Result Value    Glucose 187 (*)     AST 11 (*)     All other components within normal limits   URINALYSIS WITH REFLEX TO CULTURE - Abnormal; Notable for the following components:    Glucose, Ur 100 (*)     Ketones, Urine TRACE (*)     Protein, UA 30 (*)     All other components within normal limits   RAPID INFLUENZA A/B ANTIGENS   COVID-19, RAPID   CBC WITH AUTO DIFFERENTIAL   TROPONIN   BRAIN NATRIURETIC PEPTIDE   MICROSCOPIC URINALYSIS       When ordered only abnormal lab results are displayed. All other labs were within normal range or not returned as of this dictation. EKG: When ordered, EKG's are interpreted by the Emergency Department Physician in the absence of a cardiologist.  Please see their note for interpretation of EKG. RADIOLOGY:   Non-plain film images such as CT, Ultrasound and MRI are read by the radiologist. Plain radiographic images are visualized and preliminarily interpreted by the ED Provider with the below findings:        Interpretation per the Radiologist below, if available at the time of this note:    XR CHEST PORTABLE   Final Result   No radiographic evidence of acute pulmonary disease. XR CHEST PORTABLE    Result Date: 2/19/2023  EXAMINATION: ONE XRAY VIEW OF THE CHEST 2/19/2023 4:59 pm COMPARISON: Chest x-ray dated 09/23/2020. HISTORY: ORDERING SYSTEM PROVIDED HISTORY: ccough TECHNOLOGIST PROVIDED HISTORY: Reason for exam:->ccough Reason for Exam: cough FINDINGS: HEART/MEDIASTINUM: The cardiomediastinal silhouette is within normal limits. PLEURA/LUNGS: There are no focal consolidations or pleural effusions.  There is no appreciable pneumothorax. Chronic interstitial markings are again noted. BONES/SOFT TISSUE: No acute abnormality. No radiographic evidence of acute pulmonary disease. No results found. PROCEDURES   Unless otherwise noted below, none     Procedures    CRITICAL CARE TIME (.cctime)       PAST MEDICAL HISTORY      has a past medical history of Anxiety, BPH (benign prostatic hyperplasia), CAD (coronary artery disease) (10/25/2013), Chronic back pain (oct 2008), DDD (degenerative disc disease), lumbosacral (1/7/2013), Depression, Eosinophilic granuloma ([facility] [address] Epilepsy ([facility] [address] Hyperlipidemia, Hypertension, Irritable bowel syndrome, Myofascial pain syndrome (1/7/2013), Obstructive sleep apnea (adult) (pediatric) (10/9/2014), and Type II or unspecified type diabetes mellitus without mention of complication, not stated as uncontrolled. EMERGENCY DEPARTMENT COURSE and DIFFERENTIAL DIAGNOSIS/MDM:   Vitals:    Vitals:    02/19/23 1745 02/19/23 1800 02/19/23 1803 02/19/23 1815   BP: (!) 164/80 (!) 166/88  (!) 152/70   Pulse: 72 72  74   Resp: 18 24 16 18   Temp:       TempSrc:       SpO2: 96% 96%  96%   Weight:       Height:           Patient was given the following medications:  Medications   methylPREDNISolone sodium (SOLU-MEDROL) injection 125 mg (125 mg IntraVENous Given 2/19/23 1731)   ipratropium-albuterol (DUONEB) nebulizer solution 1 ampule (1 ampule Inhalation Given 2/19/23 1803)   albuterol (PROVENTIL) nebulizer solution 5 mg (5 mg Nebulization Given 2/19/23 1803)             Is this patient to be included in the SEP-1 Core Measure due to severe sepsis or septic shock? No   Exclusion criteria - the patient is NOT to be included for SEP-1 Core Measure due to:   Infection is not suspected    Chronic Conditions affecting care:    has a past medical history of Anxiety, BPH (benign prostatic hyperplasia), CAD (coronary artery disease) (10/25/2013), Chronic back pain (oct 2008), DDD (degenerative disc disease), lumbosacral (1/7/2013), Depression, Eosinophilic granuloma ([facility] [address] Epilepsy ([facility] [address] Hyperlipidemia, Hypertension, Irritable bowel syndrome, Myofascial pain syndrome (1/7/2013), Obstructive sleep apnea (adult) (pediatric) (10/9/2014), and Type II or unspecified type diabetes mellitus without mention of complication, not stated as uncontrolled. CONSULTS: (Who and What was discussed)  None      Social Determinants Significantly Affecting Health : None    Records Reviewed (External and Source) None    CC/HPI Summary, DDx, ED Course, and Reassessment: Patient is a 31-year-old male who presents to the ED with complaint of upper respiratory symptoms. He states he had nonproductive cough with associated wheezing and sinus congestion for the past couple of days to week. He states symptoms been constant. Became concerned and came to the ED for further evaluation treatment. Please he has a history of asthma but denies any known history of COPD. He is a long-term smoker. Upon examination he is rhonchorous lung sounds with wheezing. Believe he is suffering from what appears to be upper respiratory illness with concern for potential COPD exacerbation/wheezing. He was given Solu-Medrol 125 mg IV, DuoNeb and albuterol treatments here in the ED with significant improvement of symptoms. Some pain expiratory wheezing but feeling much better. He is able to ambulate here in the emergency department without any hypoxia or drop in his oxygen saturation. Heart rate did increase slightly above 100 and he did feel slightly short of breath but he says he feels much better at this time. Urinalysis obtained and showed no acute abnormality. CBC showed white count, hemoglobin and platelets. CMP relatively unremarkable. Troponin was normal.  BNP unremarkable. Flu and COVID were negative. Chest x-ray showed no acute abnormality. EKG interpreted by attending.   He has had ongoing symptoms for the past week and do not believe patient requires delta troponin/EKG at this time. Believe his symptoms are most likely due to upper respiratory illness. Given concern for underlying COPD exacerbation do believe he would benefit from some antibiotics for home. We will discharge home with steroid burst, albuterol inhaler and Levaquin. Follow-up with PCP. Close return precautions. Low sufficient for PE. Do not believe CT of the chest indicated. I am the Primary Clinician of Record. FINAL IMPRESSION      1. URI with cough and congestion    2. Wheezing          DISPOSITION/PLAN     DISPOSITION Decision To Discharge 02/19/2023 06:59:31 PM      PATIENT REFERRED TO:  Mary Beebe MD  [address] [place] [address],[address]  [phone]    Schedule an appointment as soon as possible for a visit   For a Re-check in  3-5    days.     [facility] Emergency Department  [address] [facility]  [address] [address]  [phone]  Go to   As needed, If symptoms worsen      DISCHARGE MEDICATIONS:  Discharge Medication List as of 2/19/2023  7:01 PM          DISCONTINUED MEDICATIONS:  Discharge Medication List as of 2/19/2023  7:01 PM                 (Please note that portions of this note were completed with a voice recognition program.  Efforts were made to edit the dictations but occasionally words are mis-transcribed.)    SHERRIE Howard (electronically signed)       SHERRIE Guerrero  02/19/23 2039

## 2023-03-28 ENCOUNTER — TELEPHONE (OUTPATIENT)
Dept: CASE MANAGEMENT | Age: 73
End: 2023-03-28

## 2023-03-28 NOTE — TELEPHONE ENCOUNTER
Call to patient to review smoking hx-started smoking age 26 and smoked 1ppd for the most part. Was able to quit for a short period, but gained weight and had DM issues. Smokes approx 1/2ppd now. Reviewed appt date and time with him.

## 2023-04-18 ENCOUNTER — TELEPHONE (OUTPATIENT)
Dept: CASE MANAGEMENT | Age: 73
End: 2023-04-18

## 2023-04-18 NOTE — TELEPHONE ENCOUNTER
Baseline lung screen on 4/10/23. LRAD2. Recommended screen in one year. Smoking cessation material mailed. Results letter mailed.  Will follow in the lung screening program.

## 2023-08-14 ENCOUNTER — OFFICE VISIT (OUTPATIENT)
Dept: PULMONOLOGY | Age: 73
End: 2023-08-14
Payer: MEDICARE

## 2023-08-14 VITALS
DIASTOLIC BLOOD PRESSURE: 82 MMHG | OXYGEN SATURATION: 96 % | SYSTOLIC BLOOD PRESSURE: 155 MMHG | HEIGHT: 69 IN | WEIGHT: 197 LBS | BODY MASS INDEX: 29.18 KG/M2 | HEART RATE: 80 BPM

## 2023-08-14 DIAGNOSIS — I10 ESSENTIAL HYPERTENSION: Chronic | ICD-10-CM

## 2023-08-14 DIAGNOSIS — G47.33 OBSTRUCTIVE SLEEP APNEA: Chronic | ICD-10-CM

## 2023-08-14 DIAGNOSIS — E11.40 TYPE 2 DIABETES MELLITUS WITH DIABETIC NEUROPATHY, UNSPECIFIED WHETHER LONG TERM INSULIN USE (HCC): Chronic | ICD-10-CM

## 2023-08-14 DIAGNOSIS — I25.83 CORONARY ARTERY DISEASE DUE TO LIPID RICH PLAQUE: Chronic | ICD-10-CM

## 2023-08-14 DIAGNOSIS — I25.10 CORONARY ARTERY DISEASE DUE TO LIPID RICH PLAQUE: Chronic | ICD-10-CM

## 2023-08-14 DIAGNOSIS — G47.01 INSOMNIA DUE TO MEDICAL CONDITION: ICD-10-CM

## 2023-08-14 PROCEDURE — 99214 OFFICE O/P EST MOD 30 MIN: CPT | Performed by: INTERNAL MEDICINE

## 2023-08-14 PROCEDURE — 1123F ACP DISCUSS/DSCN MKR DOCD: CPT | Performed by: INTERNAL MEDICINE

## 2023-08-14 PROCEDURE — 3077F SYST BP >= 140 MM HG: CPT | Performed by: INTERNAL MEDICINE

## 2023-08-14 PROCEDURE — 3079F DIAST BP 80-89 MM HG: CPT | Performed by: INTERNAL MEDICINE

## 2023-08-14 ASSESSMENT — SLEEP AND FATIGUE QUESTIONNAIRES
HOW LIKELY ARE YOU TO NOD OFF OR FALL ASLEEP WHILE SITTING AND TALKING TO SOMEONE: 0
HOW LIKELY ARE YOU TO NOD OFF OR FALL ASLEEP WHILE SITTING INACTIVE IN A PUBLIC PLACE: 0
HOW LIKELY ARE YOU TO NOD OFF OR FALL ASLEEP WHILE LYING DOWN TO REST IN THE AFTERNOON WHEN CIRCUMSTANCES PERMIT: 2
HOW LIKELY ARE YOU TO NOD OFF OR FALL ASLEEP IN A CAR, WHILE STOPPED FOR A FEW MINUTES IN TRAFFIC: 0
HOW LIKELY ARE YOU TO NOD OFF OR FALL ASLEEP WHILE SITTING AND READING: 1
HOW LIKELY ARE YOU TO NOD OFF OR FALL ASLEEP WHILE WATCHING TV: 0
ESS TOTAL SCORE: 3
HOW LIKELY ARE YOU TO NOD OFF OR FALL ASLEEP WHILE SITTING QUIETLY AFTER LUNCH WITHOUT ALCOHOL: 0
HOW LIKELY ARE YOU TO NOD OFF OR FALL ASLEEP WHEN YOU ARE A PASSENGER IN A CAR FOR AN HOUR WITHOUT A BREAK: 0

## 2023-08-14 NOTE — PROGRESS NOTES
Lucia Gar CNP [facility] [address] [facility] [address], [address] [facility] [phone]   [address]  [place], [address] [place] [address] [phone] [place] [address]  Dept: [phone]  Dept Fax: [phone]  Loc: [phone]      Assessment/Plan:      1. Obstructive sleep apnea  Assessment & Plan:  Chronic-Stable: Reviewed and analyzed results of physiologic download from patient's machine and reviewed with patient. Supplies and parts as needed for his machine. These are medically necessary. Limit caffeine use after 3pm. Based on the analyzed data will continue with current settings   2. Coronary artery disease due to lipid rich plaque  Assessment & Plan:  Chronic- Stable. Discussed the importance of treating sleep apnea as part of the management of this disorder. Cont any meds per PCP and other physicians. 3. Essential hypertension  Assessment & Plan:  Chronic- Stable. Discussed the importance of treating sleep apnea as part of the management of this disorder. Cont any meds per PCP and other physicians. 4. Type 2 diabetes mellitus with diabetic neuropathy, unspecified whether long term insulin use ([address])  Assessment & Plan:  Chronic- Stable. Discussed the importance of treating sleep apnea as part of the management of this disorder. Cont any meds per PCP and other physicians. 5. Insomnia due to medical condition  Assessment & Plan:  Chronic-not stable: Could consider prescription for gabapentin to help with sleep and pain but the patient is already on Depakote for seizures so we will need to check with Dr. Toni Stubbs first.       Reviewed, analyzed, and documented physiologic data from patient's PAP machine. Encouraged to quit tobacco in all forms, discussed different techniques to quit.     This information was

## 2023-08-14 NOTE — ASSESSMENT & PLAN NOTE
Chronic- Stable. Discussed the importance of treating sleep apnea as part of the management of this disorder. Cont any meds per PCP and other physicians.
Chronic-Stable: Reviewed and analyzed results of physiologic download from patient's machine and reviewed with patient. Supplies and parts as needed for his machine. These are medically necessary.   Limit caffeine use after 3pm. Based on the analyzed data will continue with current settings
Chronic-not stable: Could consider prescription for gabapentin to help with sleep and pain but the patient is already on Depakote for seizures so we will need to check with Dr. Ileana Rivera first.
none

## 2023-08-23 ENCOUNTER — TELEPHONE (OUTPATIENT)
Dept: PULMONOLOGY | Age: 73
End: 2023-08-23

## 2023-08-23 DIAGNOSIS — G47.01 INSOMNIA DUE TO MEDICAL CONDITION: Primary | ICD-10-CM

## 2023-08-23 NOTE — TELEPHONE ENCOUNTER
5. Insomnia due to medical condition  Assessment & Plan:  Chronic-not stable: Could consider prescription for gabapentin to help with sleep and pain but the patient is already on Depakote for seizures so we will need to check with Dr. Corey Kirkland first.

## 2023-08-23 NOTE — TELEPHONE ENCOUNTER
Pt called in stating that dr Amira Galvan told him at his appointment that he would speak with his neurologist to see if pt could take a medication from [address] in conjugation with his neurology medication. Pt not sure of name of medication. Pt stated that he is not taking neurology medication currently. Pt has not heard any updates and is looking for one.      [phone]

## 2023-08-28 ENCOUNTER — TELEPHONE (OUTPATIENT)
Dept: NEUROLOGY | Age: 73
End: 2023-08-28

## 2023-08-28 RX ORDER — GABAPENTIN 100 MG/1
CAPSULE ORAL
Qty: 120 CAPSULE | Refills: 2 | Status: SHIPPED | OUTPATIENT
Start: 2023-08-28 | End: 2023-11-28

## 2023-08-28 NOTE — TELEPHONE ENCOUNTER
See message from Dr Vik Hernandez office dated 8/23/23. Pt states he weaned himself off depakote approximately one month ago. He states he has not had any seizures and no uncontrolled shaking in hands.

## 2023-08-29 NOTE — TELEPHONE ENCOUNTER
Per Dr Natalia Steward, [facility] to proceed with gabapentin as recommended by Dr Ivette James. Note: Pt d/c'd depakote AMA.

## 2023-10-12 ENCOUNTER — APPOINTMENT (OUTPATIENT)
Dept: CT IMAGING | Age: 73
DRG: 069 | End: 2023-10-12
Payer: MEDICARE

## 2023-10-12 ENCOUNTER — APPOINTMENT (OUTPATIENT)
Dept: GENERAL RADIOLOGY | Age: 73
DRG: 069 | End: 2023-10-12
Payer: MEDICARE

## 2023-10-12 ENCOUNTER — HOSPITAL ENCOUNTER (INPATIENT)
Age: 73
LOS: 3 days | Discharge: HOME HEALTH | DRG: 069 | End: 2023-10-15
Attending: EMERGENCY MEDICINE | Admitting: INTERNAL MEDICINE
Payer: MEDICARE

## 2023-10-12 DIAGNOSIS — R42 DIZZINESS: Primary | ICD-10-CM

## 2023-10-12 DIAGNOSIS — R29.898 LEFT ARM WEAKNESS: ICD-10-CM

## 2023-10-12 DIAGNOSIS — I21.4 NSTEMI (NON-ST ELEVATED MYOCARDIAL INFARCTION) (HCC): ICD-10-CM

## 2023-10-12 PROBLEM — I63.9 ACUTE CEREBROVASCULAR ACCIDENT (CVA) (HCC): Status: ACTIVE | Noted: 2023-10-12

## 2023-10-12 LAB
ALBUMIN SERPL-MCNC: 4.1 G/DL (ref 3.4–5)
ALBUMIN/GLOB SERPL: 1.5 {RATIO} (ref 1.1–2.2)
ALP SERPL-CCNC: 102 U/L (ref 40–129)
ALT SERPL-CCNC: 12 U/L (ref 10–40)
ANION GAP SERPL CALCULATED.3IONS-SCNC: 11 MMOL/L (ref 3–16)
AST SERPL-CCNC: 15 U/L (ref 15–37)
BASOPHILS # BLD: 0.1 K/UL (ref 0–0.2)
BASOPHILS NFR BLD: 1.1 %
BILIRUB SERPL-MCNC: 0.3 MG/DL (ref 0–1)
BUN SERPL-MCNC: 20 MG/DL (ref 7–20)
CALCIUM SERPL-MCNC: 9.4 MG/DL (ref 8.3–10.6)
CHLORIDE SERPL-SCNC: 102 MMOL/L (ref 99–110)
CO2 SERPL-SCNC: 27 MMOL/L (ref 21–32)
CREAT SERPL-MCNC: 1.1 MG/DL (ref 0.8–1.3)
DEPRECATED RDW RBC AUTO: 13.4 % (ref 12.4–15.4)
EOSINOPHIL # BLD: 0.1 K/UL (ref 0–0.6)
EOSINOPHIL NFR BLD: 1.3 %
GFR SERPLBLD CREATININE-BSD FMLA CKD-EPI: >60 ML/MIN/{1.73_M2}
GLUCOSE BLD-MCNC: 196 MG/DL (ref 70–99)
GLUCOSE BLD-MCNC: 229 MG/DL (ref 70–99)
GLUCOSE SERPL-MCNC: 234 MG/DL (ref 70–99)
HCT VFR BLD AUTO: 46.6 % (ref 40.5–52.5)
HGB BLD-MCNC: 15.8 G/DL (ref 13.5–17.5)
INR PPP: 0.91 (ref 0.84–1.16)
LIPASE SERPL-CCNC: 34 U/L (ref 13–60)
LYMPHOCYTES # BLD: 1.9 K/UL (ref 1–5.1)
LYMPHOCYTES NFR BLD: 28.3 %
MCH RBC QN AUTO: 29.6 PG (ref 26–34)
MCHC RBC AUTO-ENTMCNC: 33.9 G/DL (ref 31–36)
MCV RBC AUTO: 87.4 FL (ref 80–100)
MONOCYTES # BLD: 0.5 K/UL (ref 0–1.3)
MONOCYTES NFR BLD: 6.8 %
NEUTROPHILS # BLD: 4.2 K/UL (ref 1.7–7.7)
NEUTROPHILS NFR BLD: 62.5 %
PERFORMED ON: ABNORMAL
PERFORMED ON: ABNORMAL
PLATELET # BLD AUTO: 172 K/UL (ref 135–450)
PMV BLD AUTO: 9.1 FL (ref 5–10.5)
POTASSIUM SERPL-SCNC: 4.4 MMOL/L (ref 3.5–5.1)
PROT SERPL-MCNC: 6.8 G/DL (ref 6.4–8.2)
PROTHROMBIN TIME: 12.3 SEC (ref 11.5–14.8)
RBC # BLD AUTO: 5.33 M/UL (ref 4.2–5.9)
SODIUM SERPL-SCNC: 140 MMOL/L (ref 136–145)
TROPONIN, HIGH SENSITIVITY: 101 NG/L (ref 0–22)
TROPONIN, HIGH SENSITIVITY: 108 NG/L (ref 0–22)
WBC # BLD AUTO: 6.7 K/UL (ref 4–11)

## 2023-10-12 PROCEDURE — 6360000004 HC RX CONTRAST MEDICATION: Performed by: PHYSICIAN ASSISTANT

## 2023-10-12 PROCEDURE — 71045 X-RAY EXAM CHEST 1 VIEW: CPT

## 2023-10-12 PROCEDURE — 2580000003 HC RX 258: Performed by: INTERNAL MEDICINE

## 2023-10-12 PROCEDURE — 84484 ASSAY OF TROPONIN QUANT: CPT

## 2023-10-12 PROCEDURE — 2700000000 HC OXYGEN THERAPY PER DAY

## 2023-10-12 PROCEDURE — 6360000002 HC RX W HCPCS: Performed by: INTERNAL MEDICINE

## 2023-10-12 PROCEDURE — 70450 CT HEAD/BRAIN W/O DYE: CPT

## 2023-10-12 PROCEDURE — 94761 N-INVAS EAR/PLS OXIMETRY MLT: CPT

## 2023-10-12 PROCEDURE — 6370000000 HC RX 637 (ALT 250 FOR IP): Performed by: PHYSICIAN ASSISTANT

## 2023-10-12 PROCEDURE — 2060000000 HC ICU INTERMEDIATE R&B

## 2023-10-12 PROCEDURE — 85610 PROTHROMBIN TIME: CPT

## 2023-10-12 PROCEDURE — 94640 AIRWAY INHALATION TREATMENT: CPT

## 2023-10-12 PROCEDURE — 80053 COMPREHEN METABOLIC PANEL: CPT

## 2023-10-12 PROCEDURE — 93005 ELECTROCARDIOGRAM TRACING: CPT | Performed by: EMERGENCY MEDICINE

## 2023-10-12 PROCEDURE — 6370000000 HC RX 637 (ALT 250 FOR IP): Performed by: INTERNAL MEDICINE

## 2023-10-12 PROCEDURE — 85025 COMPLETE CBC W/AUTO DIFF WBC: CPT

## 2023-10-12 PROCEDURE — 70498 CT ANGIOGRAPHY NECK: CPT

## 2023-10-12 PROCEDURE — 83690 ASSAY OF LIPASE: CPT

## 2023-10-12 PROCEDURE — 36415 COLL VENOUS BLD VENIPUNCTURE: CPT

## 2023-10-12 PROCEDURE — 99285 EMERGENCY DEPT VISIT HI MDM: CPT

## 2023-10-12 RX ORDER — SODIUM CHLORIDE 0.9 % (FLUSH) 0.9 %
5-40 SYRINGE (ML) INJECTION EVERY 12 HOURS SCHEDULED
Status: DISCONTINUED | OUTPATIENT
Start: 2023-10-12 | End: 2023-10-15 | Stop reason: HOSPADM

## 2023-10-12 RX ORDER — SODIUM CHLORIDE 9 MG/ML
INJECTION, SOLUTION INTRAVENOUS PRN
Status: DISCONTINUED | OUTPATIENT
Start: 2023-10-12 | End: 2023-10-15 | Stop reason: HOSPADM

## 2023-10-12 RX ORDER — MAGNESIUM HYDROXIDE/ALUMINUM HYDROXICE/SIMETHICONE 120; 1200; 1200 MG/30ML; MG/30ML; MG/30ML
5 SUSPENSION ORAL EVERY 6 HOURS PRN
COMMUNITY

## 2023-10-12 RX ORDER — ASPIRIN 325 MG
325 TABLET ORAL ONCE
Status: COMPLETED | OUTPATIENT
Start: 2023-10-12 | End: 2023-10-12

## 2023-10-12 RX ORDER — INSULIN LISPRO 100 [IU]/ML
0-4 INJECTION, SOLUTION INTRAVENOUS; SUBCUTANEOUS NIGHTLY
Status: DISCONTINUED | OUTPATIENT
Start: 2023-10-12 | End: 2023-10-15 | Stop reason: HOSPADM

## 2023-10-12 RX ORDER — ASPIRIN 81 MG/1
81 TABLET ORAL DAILY
Status: DISCONTINUED | OUTPATIENT
Start: 2023-10-12 | End: 2023-10-15 | Stop reason: HOSPADM

## 2023-10-12 RX ORDER — ONDANSETRON 2 MG/ML
4 INJECTION INTRAMUSCULAR; INTRAVENOUS EVERY 6 HOURS PRN
Status: DISCONTINUED | OUTPATIENT
Start: 2023-10-12 | End: 2023-10-15 | Stop reason: HOSPADM

## 2023-10-12 RX ORDER — INSULIN LISPRO 100 [IU]/ML
0-8 INJECTION, SOLUTION INTRAVENOUS; SUBCUTANEOUS
Status: DISCONTINUED | OUTPATIENT
Start: 2023-10-13 | End: 2023-10-15 | Stop reason: HOSPADM

## 2023-10-12 RX ORDER — ENOXAPARIN SODIUM 100 MG/ML
40 INJECTION SUBCUTANEOUS DAILY
Status: DISCONTINUED | OUTPATIENT
Start: 2023-10-12 | End: 2023-10-15 | Stop reason: HOSPADM

## 2023-10-12 RX ORDER — FLUOXETINE HYDROCHLORIDE 20 MG/1
20 CAPSULE ORAL DAILY
Status: DISCONTINUED | OUTPATIENT
Start: 2023-10-12 | End: 2023-10-12 | Stop reason: ALTCHOICE

## 2023-10-12 RX ORDER — HYDROCHLOROTHIAZIDE 25 MG/1
25 TABLET ORAL DAILY
Status: DISCONTINUED | OUTPATIENT
Start: 2023-10-12 | End: 2023-10-12 | Stop reason: ALTCHOICE

## 2023-10-12 RX ORDER — NICOTINE 21 MG/24HR
1 PATCH, TRANSDERMAL 24 HOURS TRANSDERMAL DAILY
Status: DISCONTINUED | OUTPATIENT
Start: 2023-10-12 | End: 2023-10-15 | Stop reason: HOSPADM

## 2023-10-12 RX ORDER — ONDANSETRON 4 MG/1
4 TABLET, ORALLY DISINTEGRATING ORAL EVERY 8 HOURS PRN
Status: DISCONTINUED | OUTPATIENT
Start: 2023-10-12 | End: 2023-10-15 | Stop reason: HOSPADM

## 2023-10-12 RX ORDER — SODIUM CHLORIDE 0.9 % (FLUSH) 0.9 %
5-40 SYRINGE (ML) INJECTION PRN
Status: DISCONTINUED | OUTPATIENT
Start: 2023-10-12 | End: 2023-10-15 | Stop reason: HOSPADM

## 2023-10-12 RX ORDER — GABAPENTIN 100 MG/1
100 CAPSULE ORAL 2 TIMES DAILY
Status: DISCONTINUED | OUTPATIENT
Start: 2023-10-12 | End: 2023-10-15 | Stop reason: HOSPADM

## 2023-10-12 RX ORDER — DEXTROSE MONOHYDRATE 100 MG/ML
INJECTION, SOLUTION INTRAVENOUS CONTINUOUS PRN
Status: DISCONTINUED | OUTPATIENT
Start: 2023-10-12 | End: 2023-10-15 | Stop reason: HOSPADM

## 2023-10-12 RX ORDER — ALBUTEROL SULFATE 90 UG/1
1 AEROSOL, METERED RESPIRATORY (INHALATION)
Status: DISCONTINUED | OUTPATIENT
Start: 2023-10-13 | End: 2023-10-15 | Stop reason: HOSPADM

## 2023-10-12 RX ORDER — ATORVASTATIN CALCIUM 80 MG/1
80 TABLET, FILM COATED ORAL DAILY
Status: DISCONTINUED | OUTPATIENT
Start: 2023-10-12 | End: 2023-10-12 | Stop reason: ALTCHOICE

## 2023-10-12 RX ORDER — ALBUTEROL SULFATE 90 UG/1
1 AEROSOL, METERED RESPIRATORY (INHALATION)
Status: DISCONTINUED | OUTPATIENT
Start: 2023-10-12 | End: 2023-10-12

## 2023-10-12 RX ORDER — TAMSULOSIN HYDROCHLORIDE 0.4 MG/1
0.4 CAPSULE ORAL DAILY
Status: DISCONTINUED | OUTPATIENT
Start: 2023-10-12 | End: 2023-10-15 | Stop reason: HOSPADM

## 2023-10-12 RX ORDER — DIVALPROEX SODIUM 500 MG/1
1500 TABLET, EXTENDED RELEASE ORAL DAILY
Status: DISCONTINUED | OUTPATIENT
Start: 2023-10-13 | End: 2023-10-15 | Stop reason: HOSPADM

## 2023-10-12 RX ORDER — POLYETHYLENE GLYCOL 3350 17 G/17G
17 POWDER, FOR SOLUTION ORAL DAILY PRN
Status: DISCONTINUED | OUTPATIENT
Start: 2023-10-12 | End: 2023-10-15 | Stop reason: HOSPADM

## 2023-10-12 RX ORDER — GLUCAGON 1 MG/ML
1 KIT INJECTION PRN
Status: DISCONTINUED | OUTPATIENT
Start: 2023-10-12 | End: 2023-10-15 | Stop reason: HOSPADM

## 2023-10-12 RX ORDER — ALBUTEROL SULFATE 90 UG/1
2 AEROSOL, METERED RESPIRATORY (INHALATION) EVERY 4 HOURS PRN
Status: DISCONTINUED | OUTPATIENT
Start: 2023-10-12 | End: 2023-10-15 | Stop reason: HOSPADM

## 2023-10-12 RX ORDER — LISINOPRIL 20 MG/1
20 TABLET ORAL DAILY
Status: DISCONTINUED | OUTPATIENT
Start: 2023-10-12 | End: 2023-10-12 | Stop reason: ALTCHOICE

## 2023-10-12 RX ADMIN — ENOXAPARIN SODIUM 40 MG: 100 INJECTION SUBCUTANEOUS at 21:16

## 2023-10-12 RX ADMIN — GABAPENTIN 100 MG: 100 CAPSULE ORAL at 21:16

## 2023-10-12 RX ADMIN — Medication 1 PUFF: at 20:41

## 2023-10-12 RX ADMIN — Medication 10 ML: at 21:17

## 2023-10-12 RX ADMIN — TAMSULOSIN HYDROCHLORIDE 0.4 MG: 0.4 CAPSULE ORAL at 21:16

## 2023-10-12 RX ADMIN — ASPIRIN 81 MG: 81 TABLET, COATED ORAL at 21:15

## 2023-10-12 RX ADMIN — ASPIRIN 325 MG: 325 TABLET ORAL at 18:16

## 2023-10-12 RX ADMIN — PREDNISONE 50 MG: 20 TABLET ORAL at 15:44

## 2023-10-12 RX ADMIN — IOPAMIDOL 75 ML: 755 INJECTION, SOLUTION INTRAVENOUS at 16:21

## 2023-10-12 ASSESSMENT — ENCOUNTER SYMPTOMS
SHORTNESS OF BREATH: 0
SORE THROAT: 0
DIARRHEA: 0
RHINORRHEA: 0
VOMITING: 0
NAUSEA: 0
CONSTIPATION: 0
EYE PAIN: 0
BACK PAIN: 0
ABDOMINAL PAIN: 1
COUGH: 0

## 2023-10-12 ASSESSMENT — PAIN SCALES - GENERAL: PAINLEVEL_OUTOF10: 0

## 2023-10-12 NOTE — ED NOTES
ED TO INPATIENT SBAR HANDOFF    Patient Name: Adán West   :  1950  68 y.o. MRN:  [id number]  Preferred Name  Afsaneh Chinchilla  ED Room #:  ED-0019/19  Family/Caregiver Present no   Restraints no   Sitter no   Sepsis Risk Score Sepsis Risk Score: 0.85    Situation  Code Status: Prior No additional code details. Allergies: Penicillins, Benadryl [diphenhydramine hcl], Erythromycin, and Iodine  Weight: No data found. Arrived from: home  Chief Complaint:   Chief Complaint   Patient presents with    Dizziness     Pt came to the hospital due to having dizziness and heaviness in his left arm for the past couple of days, currently c/o of pain in the RUQ that goes around to his back. Hospital Problem/Diagnosis:  Principal Problem:    Acute cerebrovascular accident (CVA) [facility])  Resolved Problems:    * No resolved hospital problems. *    Imaging:   CTA HEAD NECK W CONTRAST   Final Result   Unremarkable CTA of the head and neck. Diffuse interstitial opacities within both upper lobes could represent   infectious/inflammatory process. CT HEAD WO CONTRAST   Final Result   1. No acute intracranial findings. 2. Near complete opacification of the left maxillary sinus. 3. Volume loss and chronic microvascular ischemic changes. XR CHEST PORTABLE   Final Result   No radiographic evidence of acute pulmonary disease.          US GALLBLADDER RUQ    (Results Pending)     Abnormal labs:   Abnormal Labs Reviewed   COMPREHENSIVE METABOLIC PANEL W/ REFLEX TO MG FOR LOW K - Abnormal; Notable for the following components:       Result Value    Glucose 234 (*)     All other components within normal limits   TROPONIN - Abnormal; Notable for the following components:    Troponin, High Sensitivity 101 (*)     All other components within normal limits   TROPONIN - Abnormal; Notable for the following components:    Troponin, High Sensitivity 108 (*)     All other components within normal limits   POCT GLUCOSE -

## 2023-10-12 NOTE — H&P
HOSPITALISTS HISTORY AND PHYSICAL    10/12/2023 6:39 PM    Patient Information:  Juan Valentino is a 68 y.o. male [id number]  PCP:  Aime aHrris MD (Tel: [phone] )    Chief complaint:    Chief Complaint   Patient presents with    Dizziness     Pt came to the hospital due to having dizziness and heaviness in his left arm for the past couple of days, currently c/o of pain in the RUQ that goes around to his back. History of Present Illness:  Juan Valentino is a 68 y.o. male for the last 2 days is complained of dizziness and heaviness in the left arm and left leg with increased weakness and numbness no slurred speech or facial numbness denies any chest pain or increased shortness of breath. Patient smokes roughly half pack per day no alcohol use has a history of stroke and NSTEMI in 2013 currently he is not taking his aspirin. Denies any  drug use  Complaining about right upper quadrant abdominal pain that is sharp in nature no nausea vomiting    REVIEW OF SYSTEMS:   Constitutional: Negative for fever,chills or night sweats  ENT: Negative for rhinorrhea, epistaxis, hoarseness, sore throat. Respiratory: Negative for shortness of breath,wheezing  Cardiovascular: Negative for chest pain, palpitations     Genitourinary: Negative for polyuria, dysuria   Hematologic/Lymphatic: Negative for bleeding tendency, easy bruising  Musculoskeletal: Negative for myalgias and arthralgias  Skin: Negative for itching,rash  Psychiatric: Negative for depression,anxiety, agitation. Endocrine: Negative for polydipsia,polyuria,heat /cold intolerance.     Past Medical History:   has a past medical history of Anxiety, BPH (benign prostatic hyperplasia), CAD (coronary artery disease), Chronic back pain, DDD (degenerative disc disease), lumbosacral, Depression, Eosinophilic granuloma ([address]), Epilepsy ([address]), Hyperlipidemia, Hypertension,

## 2023-10-12 NOTE — ACP (ADVANCE CARE PLANNING)
Advanced Care Planning Note. Purpose of Encounter: Advanced care planning in light of hospitalization  Parties In Attendance: Patient,    Decisional Capacity: Yes  Subjective: Patient  understand that this conversation is to address long term care goal  Objective: To hospital with acute CVA history of tobacco abuse and prior coronary artery disease  Goals of Care Determination: Patient would pursue CPR and Intubation if required.   Unsure about long term ventilation/tracheostomy, would want family to make the decision at the time if required  Code Status: full code  Time spent on Advanced care Plannin minutes  Advanced Care Planning Documents: documented patient's wishes, would like [facility] to make medical decisions if unable to make decisions    Adilene Cardenas MD  10/12/2023 6:45 PM

## 2023-10-13 ENCOUNTER — APPOINTMENT (OUTPATIENT)
Dept: MRI IMAGING | Age: 73
DRG: 069 | End: 2023-10-13
Payer: MEDICARE

## 2023-10-13 ENCOUNTER — APPOINTMENT (OUTPATIENT)
Dept: ULTRASOUND IMAGING | Age: 73
DRG: 069 | End: 2023-10-13
Payer: MEDICARE

## 2023-10-13 PROBLEM — R42 DIZZINESS: Status: ACTIVE | Noted: 2023-10-13

## 2023-10-13 LAB
CHOLEST SERPL-MCNC: 174 MG/DL (ref 0–199)
DEPRECATED RDW RBC AUTO: 12.8 % (ref 12.4–15.4)
EKG ATRIAL RATE: 90 BPM
EKG DIAGNOSIS: NORMAL
EKG P AXIS: 59 DEGREES
EKG P-R INTERVAL: 152 MS
EKG Q-T INTERVAL: 368 MS
EKG QRS DURATION: 110 MS
EKG QTC CALCULATION (BAZETT): 450 MS
EKG R AXIS: -58 DEGREES
EKG T AXIS: 76 DEGREES
EKG VENTRICULAR RATE: 90 BPM
EST. AVERAGE GLUCOSE BLD GHB EST-MCNC: 165.7 MG/DL
GLUCOSE BLD-MCNC: 126 MG/DL (ref 70–99)
GLUCOSE BLD-MCNC: 178 MG/DL (ref 70–99)
GLUCOSE BLD-MCNC: 180 MG/DL (ref 70–99)
GLUCOSE BLD-MCNC: 194 MG/DL (ref 70–99)
HBA1C MFR BLD: 7.4 %
HCT VFR BLD AUTO: 46.4 % (ref 40.5–52.5)
HDLC SERPL-MCNC: 36 MG/DL (ref 40–60)
HGB BLD-MCNC: 15.8 G/DL (ref 13.5–17.5)
LDL CHOLESTEROL CALCULATED: 111 MG/DL
MCH RBC QN AUTO: 29.7 PG (ref 26–34)
MCHC RBC AUTO-ENTMCNC: 34.1 G/DL (ref 31–36)
MCV RBC AUTO: 86.9 FL (ref 80–100)
PERFORMED ON: ABNORMAL
PLATELET # BLD AUTO: 180 K/UL (ref 135–450)
PMV BLD AUTO: 9 FL (ref 5–10.5)
RBC # BLD AUTO: 5.34 M/UL (ref 4.2–5.9)
TRIGL SERPL-MCNC: 137 MG/DL (ref 0–150)
TROPONIN, HIGH SENSITIVITY: 77 NG/L (ref 0–22)
VLDLC SERPL CALC-MCNC: 27 MG/DL
WBC # BLD AUTO: 7.7 K/UL (ref 4–11)

## 2023-10-13 PROCEDURE — 94640 AIRWAY INHALATION TREATMENT: CPT

## 2023-10-13 PROCEDURE — 2060000000 HC ICU INTERMEDIATE R&B

## 2023-10-13 PROCEDURE — 6370000000 HC RX 637 (ALT 250 FOR IP): Performed by: INTERNAL MEDICINE

## 2023-10-13 PROCEDURE — 70551 MRI BRAIN STEM W/O DYE: CPT

## 2023-10-13 PROCEDURE — 85027 COMPLETE CBC AUTOMATED: CPT

## 2023-10-13 PROCEDURE — 97161 PT EVAL LOW COMPLEX 20 MIN: CPT

## 2023-10-13 PROCEDURE — 2700000000 HC OXYGEN THERAPY PER DAY

## 2023-10-13 PROCEDURE — 36415 COLL VENOUS BLD VENIPUNCTURE: CPT

## 2023-10-13 PROCEDURE — APPSS60 APP SPLIT SHARED TIME 46-60 MINUTES

## 2023-10-13 PROCEDURE — 76705 ECHO EXAM OF ABDOMEN: CPT

## 2023-10-13 PROCEDURE — 92526 ORAL FUNCTION THERAPY: CPT

## 2023-10-13 PROCEDURE — 6360000002 HC RX W HCPCS: Performed by: INTERNAL MEDICINE

## 2023-10-13 PROCEDURE — 99222 1ST HOSP IP/OBS MODERATE 55: CPT | Performed by: INTERNAL MEDICINE

## 2023-10-13 PROCEDURE — 84484 ASSAY OF TROPONIN QUANT: CPT

## 2023-10-13 PROCEDURE — 93306 TTE W/DOPPLER COMPLETE: CPT

## 2023-10-13 PROCEDURE — 92523 SPEECH SOUND LANG COMPREHEN: CPT

## 2023-10-13 PROCEDURE — 83036 HEMOGLOBIN GLYCOSYLATED A1C: CPT

## 2023-10-13 PROCEDURE — 94761 N-INVAS EAR/PLS OXIMETRY MLT: CPT

## 2023-10-13 PROCEDURE — 93010 ELECTROCARDIOGRAM REPORT: CPT | Performed by: INTERNAL MEDICINE

## 2023-10-13 PROCEDURE — 99222 1ST HOSP IP/OBS MODERATE 55: CPT | Performed by: PSYCHIATRY & NEUROLOGY

## 2023-10-13 PROCEDURE — 97165 OT EVAL LOW COMPLEX 30 MIN: CPT

## 2023-10-13 PROCEDURE — 2580000003 HC RX 258: Performed by: INTERNAL MEDICINE

## 2023-10-13 PROCEDURE — 92610 EVALUATE SWALLOWING FUNCTION: CPT

## 2023-10-13 PROCEDURE — APPNB30 APP NON BILLABLE TIME 0-30 MINS

## 2023-10-13 PROCEDURE — 80061 LIPID PANEL: CPT

## 2023-10-13 RX ORDER — ATORVASTATIN CALCIUM 80 MG/1
80 TABLET, FILM COATED ORAL DAILY
Status: DISCONTINUED | OUTPATIENT
Start: 2023-10-13 | End: 2023-10-15 | Stop reason: HOSPADM

## 2023-10-13 RX ORDER — REGADENOSON 0.08 MG/ML
0.4 INJECTION, SOLUTION INTRAVENOUS
Status: DISCONTINUED | OUTPATIENT
Start: 2023-10-13 | End: 2023-10-15 | Stop reason: HOSPADM

## 2023-10-13 RX ORDER — LISINOPRIL 20 MG/1
20 TABLET ORAL DAILY
Status: DISCONTINUED | OUTPATIENT
Start: 2023-10-13 | End: 2023-10-15 | Stop reason: HOSPADM

## 2023-10-13 RX ADMIN — Medication 1 PUFF: at 08:58

## 2023-10-13 RX ADMIN — GABAPENTIN 100 MG: 100 CAPSULE ORAL at 21:42

## 2023-10-13 RX ADMIN — Medication 1 PUFF: at 21:27

## 2023-10-13 RX ADMIN — DIVALPROEX SODIUM 1500 MG: 500 TABLET, EXTENDED RELEASE ORAL at 08:23

## 2023-10-13 RX ADMIN — Medication 10 ML: at 08:24

## 2023-10-13 RX ADMIN — GABAPENTIN 100 MG: 100 CAPSULE ORAL at 08:23

## 2023-10-13 RX ADMIN — Medication 10 ML: at 21:42

## 2023-10-13 RX ADMIN — ASPIRIN 81 MG: 81 TABLET, COATED ORAL at 08:23

## 2023-10-13 RX ADMIN — LISINOPRIL 20 MG: 20 TABLET ORAL at 08:25

## 2023-10-13 RX ADMIN — ATORVASTATIN CALCIUM 80 MG: 80 TABLET, FILM COATED ORAL at 08:23

## 2023-10-13 RX ADMIN — TAMSULOSIN HYDROCHLORIDE 0.4 MG: 0.4 CAPSULE ORAL at 08:23

## 2023-10-13 RX ADMIN — ENOXAPARIN SODIUM 40 MG: 100 INJECTION SUBCUTANEOUS at 08:24

## 2023-10-13 NOTE — CARE COORDINATION
Discharge Planning:     (CM) reviewed the patient's chart to assess needs. Patient her for acute CVA. Possible need for PT/OT evaluations. CM will follow for discharge planning needs.      Electronically signed by Yazmin Veliz on 10/13/2023 at 8:24 AM

## 2023-10-13 NOTE — PLAN OF CARE
Problem: Discharge Planning  Goal: Discharge to home or other facility with appropriate resources  10/13/2023 1358 by Valdemar Ramsey RN  Outcome: Progressing     Problem: Safety - Adult  Goal: Free from fall injury  10/13/2023 1358 by Valdemar Ramsey RN  Outcome: Progressing     Problem: Chronic Conditions and Co-morbidities  Goal: Patient's chronic conditions and co-morbidity symptoms are monitored and maintained or improved  Outcome: Progressing

## 2023-10-13 NOTE — CARE COORDINATION
Case Management Assessment  Initial Evaluation    Date/Time of Evaluation: 10/13/2023 3:59 PM  Assessment Completed by: Madhu Botello    If patient is discharged prior to next notation, then this note serves as note for discharge by case management. Patient Name: Phani Rogers                   YOB: 1950  Diagnosis: Dizziness [R42]  Left arm weakness [R29.898]  NSTEMI (non-ST elevated myocardial infarction) [facility]) [I21.4]  Acute cerebrovascular accident (CVA) [facility]) [I63.9]                   Date / Time: 10/12/2023  3:08 PM    Patient Admission Status: Inpatient   Readmission Risk (Low < 19, Mod (19-27), High > 27): Readmission Risk Score: 8.6    Current PCP: Jamila Hopper MD  PCP verified by CM? Yes    Chart Reviewed: Yes      History Provided by: Patient  Patient Orientation: Alert and Oriented, Person, Place    Patient Cognition: Alert    Hospitalization in the last 30 days (Readmission):  No    If yes, Readmission Assessment in  Navigator will be completed. Advance Directives:      Code Status: Full Code   Patient's Primary Decision Maker is: Legal Next of Kin    Primary Decision Maker: Estephania Wagnerchristopherjeni - Brother/Sister - [phone]    Secondary Decision Maker: Michela Sandy - Niece/Nephew - [phone]    Secondary Decision Maker: Qasim Goodson - [phone]    Discharge Planning:    Patient lives with: (P) Alone Type of Home: (P) [address]  Primary Care Giver: Self  Patient Support Systems include: None   Current Financial resources: (P) Medicare, Medicaid  Current community resources: (P) Transportation  Current services prior to admission: (P) None            Current DME:              Type of Home Care services:  (P) None    ADLS  Prior functional level:  Independent in ADLs/IADLs  Current functional level: (P) Assistance with the following:, Mobility    PT AM-PAC: 17 /24  OT AM-PAC: 19 /24    Family can provide assistance at DC: (P) No  Would you like Case

## 2023-10-13 NOTE — CONSULTS
[address] [place] [address]  [phone]        Reason for Consultation/Chief Complaint: \"Dizziness. \"  Pt came to the hospital due to having dizziness and heaviness in his left arm for the past couple of days, currently c/o of pain in the RUQ that goes around to his back. History of Present Illness:  Lloyd Pickens is a 68 y.o. patient who presented to the hospital with complaints of dizziness for the last 2 days and heaviness in the left arm and left leg with increased weakness and numbness, no slurred speech or facial numbness and denies any chest pain or shortness of breath. Patient smoked about a half a pack per day. He has a history of stroke and NSTEMI in 2013. He last saw me in the office in 2020. I have been asked to evaluate patient for elevated troponin. He denies chest discomfort. He states that he had dizziness and some heaviness in his left arm. He also notes right upper quadrant discomfort secondary to a fall a few months ago. Past Medical History:   has a past medical history of Anxiety, BPH (benign prostatic hyperplasia), CAD (coronary artery disease), Chronic back pain, DDD (degenerative disc disease), lumbosacral, Depression, Eosinophilic granuloma ([address]), Epilepsy ([address]), Hyperlipidemia, Hypertension, Irritable bowel syndrome, Myofascial pain syndrome, Obstructive sleep apnea (adult) (pediatric), and Type II or unspecified type diabetes mellitus without mention of complication, not stated as uncontrolled. Surgical History:   has a past surgical history that includes Lung biopsy; Coronary angioplasty with stent (10/2013); and Elbow bursa surgery (Left, 7/24/2014). Social History:   reports that he has been smoking cigarettes. He started smoking about 54 years ago. He has a 54.00 pack-year smoking history. He has never used smokeless tobacco. He reports that he does not drink alcohol and does not use drugs.      Family History:  family history includes Diabetes in his
note  Continue Depakote the same dose  Follow level  Seizure precautions  PT and OT  Blood pressure control  Diabetic control  Aspirin and statin for stroke prevention  Echo was ordered  Can be discharged from neurology once medically stable and follow-up with his PCP  No further recommendation-----------------------------------      Electronically signed by Jemma Gage MD on 10/13/23 at 1:05 PM EDT         This dictation was generated by voice recognition computer software.  Although all attempts are made to edit the dictation for accuracy, there may be errors in the  transcription that are not intended

## 2023-10-14 LAB
GLUCOSE BLD-MCNC: 113 MG/DL (ref 70–99)
GLUCOSE BLD-MCNC: 126 MG/DL (ref 70–99)
GLUCOSE BLD-MCNC: 133 MG/DL (ref 70–99)
GLUCOSE BLD-MCNC: 228 MG/DL (ref 70–99)
PERFORMED ON: ABNORMAL

## 2023-10-14 PROCEDURE — 6360000002 HC RX W HCPCS: Performed by: INTERNAL MEDICINE

## 2023-10-14 PROCEDURE — 78452 HT MUSCLE IMAGE SPECT MULT: CPT | Performed by: INTERNAL MEDICINE

## 2023-10-14 PROCEDURE — 3430000000 HC RX DIAGNOSTIC RADIOPHARMACEUTICAL: Performed by: INTERNAL MEDICINE

## 2023-10-14 PROCEDURE — 94640 AIRWAY INHALATION TREATMENT: CPT

## 2023-10-14 PROCEDURE — 99232 SBSQ HOSP IP/OBS MODERATE 35: CPT | Performed by: NURSE PRACTITIONER

## 2023-10-14 PROCEDURE — A9502 TC99M TETROFOSMIN: HCPCS | Performed by: INTERNAL MEDICINE

## 2023-10-14 PROCEDURE — 94761 N-INVAS EAR/PLS OXIMETRY MLT: CPT

## 2023-10-14 PROCEDURE — 6370000000 HC RX 637 (ALT 250 FOR IP): Performed by: INTERNAL MEDICINE

## 2023-10-14 PROCEDURE — 2580000003 HC RX 258: Performed by: INTERNAL MEDICINE

## 2023-10-14 PROCEDURE — 94660 CPAP INITIATION&MGMT: CPT

## 2023-10-14 PROCEDURE — 2060000000 HC ICU INTERMEDIATE R&B

## 2023-10-14 PROCEDURE — 2700000000 HC OXYGEN THERAPY PER DAY

## 2023-10-14 PROCEDURE — 93017 CV STRESS TEST TRACING ONLY: CPT | Performed by: INTERNAL MEDICINE

## 2023-10-14 RX ORDER — REGADENOSON 0.08 MG/ML
0.4 INJECTION, SOLUTION INTRAVENOUS
Status: COMPLETED | OUTPATIENT
Start: 2023-10-14 | End: 2023-10-14

## 2023-10-14 RX ADMIN — Medication 1 PUFF: at 21:28

## 2023-10-14 RX ADMIN — ATORVASTATIN CALCIUM 80 MG: 80 TABLET, FILM COATED ORAL at 08:19

## 2023-10-14 RX ADMIN — Medication 1 PUFF: at 12:42

## 2023-10-14 RX ADMIN — TETROFOSMIN 30 MILLICURIE: 1.38 INJECTION, POWDER, LYOPHILIZED, FOR SOLUTION INTRAVENOUS at 10:00

## 2023-10-14 RX ADMIN — Medication 10 ML: at 19:14

## 2023-10-14 RX ADMIN — TETROFOSMIN 10 MILLICURIE: 1.38 INJECTION, POWDER, LYOPHILIZED, FOR SOLUTION INTRAVENOUS at 08:57

## 2023-10-14 RX ADMIN — ASPIRIN 81 MG: 81 TABLET, COATED ORAL at 08:19

## 2023-10-14 RX ADMIN — Medication 1 SPRAY: at 16:44

## 2023-10-14 RX ADMIN — REGADENOSON 0.4 MG: 0.08 INJECTION, SOLUTION INTRAVENOUS at 10:01

## 2023-10-14 RX ADMIN — LISINOPRIL 20 MG: 20 TABLET ORAL at 08:19

## 2023-10-14 RX ADMIN — ENOXAPARIN SODIUM 40 MG: 100 INJECTION SUBCUTANEOUS at 08:21

## 2023-10-14 RX ADMIN — GABAPENTIN 100 MG: 100 CAPSULE ORAL at 19:13

## 2023-10-14 RX ADMIN — TAMSULOSIN HYDROCHLORIDE 0.4 MG: 0.4 CAPSULE ORAL at 08:19

## 2023-10-14 RX ADMIN — INSULIN LISPRO 2 UNITS: 100 INJECTION, SOLUTION INTRAVENOUS; SUBCUTANEOUS at 12:39

## 2023-10-14 RX ADMIN — DIVALPROEX SODIUM 1500 MG: 500 TABLET, EXTENDED RELEASE ORAL at 08:19

## 2023-10-14 RX ADMIN — Medication 10 ML: at 08:21

## 2023-10-14 RX ADMIN — GABAPENTIN 100 MG: 100 CAPSULE ORAL at 08:19

## 2023-10-14 ASSESSMENT — PAIN SCALES - GENERAL: PAINLEVEL_OUTOF10: 0

## 2023-10-14 NOTE — CARE COORDINATION
Discharge Planning:     [facility] admissions staff, Dre Haley ([phone]), who reported that she is still awaiting a decision from Director of Nursing (DON) on whether they can accept patient. Dre Haley agreed to follow-up with DON and update CM on referral decision.       Roderick SWARTZ, GARTH, [facility] -   [phone]    Electronically signed by MAXIME Hendrickson on 10/14/2023 at 9:22 AM

## 2023-10-14 NOTE — PLAN OF CARE
Problem: Discharge Planning  Goal: Discharge to home or other facility with appropriate resources  10/14/2023 1328 by Shirlene Tomlin RN  Outcome: Progressing  Flowsheets (Taken 10/14/2023 0745)  Discharge to home or other facility with appropriate resources: Identify barriers to discharge with patient and caregiver  10/14/2023 0410 by Estephania Priest RN  Outcome: Progressing     Problem: Safety - Adult  Goal: Free from fall injury  10/14/2023 1328 by Shirlene Tomlin RN  Outcome: Progressing  Flowsheets (Taken 10/14/2023 1328)  Free From Fall Injury: Instruct family/caregiver on patient safety  10/14/2023 0410 by Estephania Priest RN  Outcome: Progressing     Problem: Chronic Conditions and Co-morbidities  Goal: Patient's chronic conditions and co-morbidity symptoms are monitored and maintained or improved  Recent Flowsheet Documentation  Taken 10/14/2023 0745 by Shirlene Tomlin [facility] - Patient's Chronic Conditions and Co-Morbidity Symptoms are Monitored and Maintained or Improved: Monitor and assess patient's chronic conditions and comorbid symptoms for stability, deterioration, or improvement  10/14/2023 0410 by Estephania Priest RN  Outcome: Progressing

## 2023-10-15 VITALS
OXYGEN SATURATION: 95 % | TEMPERATURE: 98.7 F | HEART RATE: 79 BPM | HEIGHT: 69 IN | BODY MASS INDEX: 28.64 KG/M2 | SYSTOLIC BLOOD PRESSURE: 167 MMHG | DIASTOLIC BLOOD PRESSURE: 76 MMHG | WEIGHT: 193.4 LBS | RESPIRATION RATE: 18 BRPM

## 2023-10-15 PROCEDURE — 2580000003 HC RX 258: Performed by: INTERNAL MEDICINE

## 2023-10-15 PROCEDURE — 6370000000 HC RX 637 (ALT 250 FOR IP): Performed by: INTERNAL MEDICINE

## 2023-10-15 PROCEDURE — 6360000002 HC RX W HCPCS: Performed by: INTERNAL MEDICINE

## 2023-10-15 RX ORDER — ATORVASTATIN CALCIUM 40 MG/1
40 TABLET, FILM COATED ORAL DAILY
Qty: 30 TABLET | Refills: 3 | Status: SHIPPED | OUTPATIENT
Start: 2023-10-15

## 2023-10-15 RX ORDER — LISINOPRIL 20 MG/1
20 TABLET ORAL DAILY
Qty: 30 TABLET | Refills: 3 | Status: SHIPPED | OUTPATIENT
Start: 2023-10-16

## 2023-10-15 RX ORDER — NICOTINE 21 MG/24HR
1 PATCH, TRANSDERMAL 24 HOURS TRANSDERMAL DAILY
Qty: 30 PATCH | Refills: 3 | Status: SHIPPED | OUTPATIENT
Start: 2023-10-16

## 2023-10-15 RX ADMIN — ENOXAPARIN SODIUM 40 MG: 100 INJECTION SUBCUTANEOUS at 08:06

## 2023-10-15 RX ADMIN — GABAPENTIN 100 MG: 100 CAPSULE ORAL at 08:06

## 2023-10-15 RX ADMIN — ATORVASTATIN CALCIUM 80 MG: 80 TABLET, FILM COATED ORAL at 08:06

## 2023-10-15 RX ADMIN — Medication 10 ML: at 09:12

## 2023-10-15 RX ADMIN — TAMSULOSIN HYDROCHLORIDE 0.4 MG: 0.4 CAPSULE ORAL at 08:00

## 2023-10-15 RX ADMIN — ASPIRIN 81 MG: 81 TABLET, COATED ORAL at 08:06

## 2023-10-15 RX ADMIN — DIVALPROEX SODIUM 1500 MG: 500 TABLET, EXTENDED RELEASE ORAL at 08:06

## 2023-10-15 RX ADMIN — LISINOPRIL 20 MG: 20 TABLET ORAL at 08:07

## 2023-10-15 NOTE — PLAN OF CARE
Problem: Discharge Planning  Goal: Discharge to home or other facility with appropriate resources  10/15/2023 0049 by Diamond Ramos RN  Outcome: Progressing  Flowsheets (Taken 10/14/2023 1530)  Discharge to home or other facility with appropriate resources:   Identify barriers to discharge with patient and caregiver   Arrange for needed discharge resources and transportation as appropriate   Identify discharge learning needs (meds, wound care, etc)   Refer to discharge planning if patient needs post-hospital services based on physician order or complex needs related to functional status, cognitive ability or social support system  10/14/2023 1328 by Esther Zapata RN  Outcome: Progressing  Flowsheets (Taken 10/14/2023 0745)  Discharge to home or other facility with appropriate resources: Identify barriers to discharge with patient and caregiver     Problem: Safety - Adult  Goal: Free from fall injury  10/15/2023 0049 by Diamond Ramos RN  Outcome: Progressing  10/14/2023 1328 by Esther Zapata RN  Outcome: Progressing  Flowsheets (Taken 10/14/2023 1328)  Free From Fall Injury: Instruct family/caregiver on patient safety     Problem: Chronic Conditions and Co-morbidities  Goal: Patient's chronic conditions and co-morbidity symptoms are monitored and maintained or improved  Outcome: Progressing  Flowsheets (Taken 10/14/2023 1530)  Care Plan - Patient's Chronic Conditions and Co-Morbidity Symptoms are Monitored and Maintained or Improved:   Monitor and assess patient's chronic conditions and comorbid symptoms for stability, deterioration, or improvement   Collaborate with multidisciplinary team to address chronic and comorbid conditions and prevent exacerbation or deterioration   Update acute care plan with appropriate goals if chronic or comorbid symptoms are exacerbated and prevent overall improvement and discharge

## 2023-10-15 NOTE — CARE COORDINATION
Discharge Planning:     (STEFANI) called and [facility] reported that she spoke with her Director of Nursing (DON) yesterday who reported that she doesn't remember what she \"wrote on her paper\" on whether or not they could accept so she will have to review the patient tomorrow morning and inform on whether they can accept or not.       Nohemy SWARTZ, GIA-S, [facility] Work -   [phone]    Electronically signed by MAXIME Mcrae on 10/15/2023 at 1:39 PM

## 2023-10-15 NOTE — CARE COORDINATION
10/15/23 1324   IMM Letter   IMM Letter given to Patient/Family/Significant other/Guardian/POA/by: Given to Patient by . IMM Letter date given: 10/15/23   IMM Letter time given: 1306     Discharge Planning:     (CM) informed patient that [facility] has not responded for whether they can accept and informed that additional referrals can be made to other facilities. Patient denied, but agreed to [facility] making a [address] [facility]) referral. Patient denied any [facility] preference and agreed to [facility] referring to a [facility] who accepts his health insurance. Patient confirmed that he would like the [employer] that was ordered. CM RN, Beatris Ennis, delivered walker to patient. Completed paperwork to be placed on [employer] staff, Boby Burden, desk. CM called [facility]) staff, Ronald Lynn ([phone]), who accepted patient's referral and verbalized understanding of patient's discharge today. [facility] confirmed that she will pull all needed [facility]. All CM discharge needs met at this time. Medical staff to inform CM team if additional discharge needs arise.          Aileen SWARTZ, GARTH, [facility] -   [phone]    Electronically signed by MAXIME Miller on 10/15/2023 at 1:36 PM

## 2023-10-15 NOTE — DISCHARGE SUMMARY
tablet Take 1 tablet by mouth daily  Qty: 90 tablet, Refills: 3      omeprazole (PRILOSEC) 20 MG delayed release capsule Take 1 capsule by mouth daily      Blood Pressure Monitoring (BLOOD PRESSURE CUFF) MISC 1 Units by Does not apply route daily  Qty: 1 each, Refills: 0    Associated Diagnoses: TIA involving right internal carotid artery; Coronary artery disease involving native heart without angina pectoris, unspecified vessel or lesion type; Status post insertion of drug-eluting stent into right coronary artery for coronary artery disease; Status post insertion of drug-eluting stent into left anterior descending (LAD) artery; Mixed hyperlipidemia; Essential hypertension; Other specified cardiac arrhythmias      tamsulosin (FLOMAX) 0.4 MG capsule TAKE ONE CAPSULE BY MOUTH EVERY DAY IN THE EVENING  Qty: 30 capsule, Refills: 2           Current Discharge Medication List        STOP taking these medications       hydrochlorothiazide (HYDRODIURIL) 25 MG tablet Comments:   Reason for Stopping:         metoprolol tartrate (LOPRESSOR) 25 MG tablet Comments:   Reason for Stopping:               Labs:  For convenience and continuity at follow-up the following most recent labs are provided:    Lab Results   Component Value Date/Time    WBC 7.7 10/13/2023 05:07 AM    HGB 15.8 10/13/2023 05:07 AM    HCT 46.4 10/13/2023 05:07 AM    MCV 86.9 10/13/2023 05:07 AM     10/13/2023 05:07 AM     10/12/2023 03:30 PM    K 4.4 10/12/2023 03:30 PM     10/12/2023 03:30 PM    CO2 27 10/12/2023 03:30 PM    BUN 20 10/12/2023 03:30 PM    CREATININE 1.1 10/12/2023 03:30 PM    CALCIUM 9.4 10/12/2023 03:30 PM    ALKPHOS 102 10/12/2023 03:30 PM    ALT 12 10/12/2023 03:30 PM    AST 15 10/12/2023 03:30 PM    BILITOT 0.3 10/12/2023 03:30 PM    BILIDIR 0.10 10/25/2013 08:19 AM    LABALBU 4.1 10/12/2023 03:30 PM    LDLCALC 111 10/13/2023 05:07 AM    TRIG 130 06/26/2020 08:30 AM     Lab Results   Component Value Date    INR 0.91

## 2023-11-29 ENCOUNTER — TELEPHONE (OUTPATIENT)
Dept: PULMONOLOGY | Age: 73
End: 2023-11-29

## 2023-11-29 ENCOUNTER — OFFICE VISIT (OUTPATIENT)
Dept: PULMONOLOGY | Age: 73
End: 2023-11-29
Payer: MEDICARE

## 2023-11-29 VITALS
SYSTOLIC BLOOD PRESSURE: 118 MMHG | OXYGEN SATURATION: 97 % | HEART RATE: 61 BPM | DIASTOLIC BLOOD PRESSURE: 60 MMHG | WEIGHT: 200.4 LBS | HEIGHT: 69 IN | BODY MASS INDEX: 29.68 KG/M2

## 2023-11-29 DIAGNOSIS — I10 ESSENTIAL HYPERTENSION: ICD-10-CM

## 2023-11-29 DIAGNOSIS — G47.33 OSA (OBSTRUCTIVE SLEEP APNEA): Primary | ICD-10-CM

## 2023-11-29 DIAGNOSIS — I25.83 CORONARY ARTERY DISEASE DUE TO LIPID RICH PLAQUE: ICD-10-CM

## 2023-11-29 DIAGNOSIS — I25.10 CORONARY ARTERY DISEASE DUE TO LIPID RICH PLAQUE: ICD-10-CM

## 2023-11-29 DIAGNOSIS — E11.40 TYPE 2 DIABETES MELLITUS WITH DIABETIC NEUROPATHY, UNSPECIFIED WHETHER LONG TERM INSULIN USE (HCC): ICD-10-CM

## 2023-11-29 DIAGNOSIS — G47.01 INSOMNIA DUE TO MEDICAL CONDITION: ICD-10-CM

## 2023-11-29 PROCEDURE — 99214 OFFICE O/P EST MOD 30 MIN: CPT | Performed by: NURSE PRACTITIONER

## 2023-11-29 PROCEDURE — G8417 CALC BMI ABV UP PARAM F/U: HCPCS | Performed by: NURSE PRACTITIONER

## 2023-11-29 PROCEDURE — 3051F HG A1C>EQUAL 7.0%<8.0%: CPT | Performed by: NURSE PRACTITIONER

## 2023-11-29 PROCEDURE — 3078F DIAST BP <80 MM HG: CPT | Performed by: NURSE PRACTITIONER

## 2023-11-29 PROCEDURE — G8484 FLU IMMUNIZE NO ADMIN: HCPCS | Performed by: NURSE PRACTITIONER

## 2023-11-29 PROCEDURE — 2022F DILAT RTA XM EVC RTNOPTHY: CPT | Performed by: NURSE PRACTITIONER

## 2023-11-29 PROCEDURE — 3017F COLORECTAL CA SCREEN DOC REV: CPT | Performed by: NURSE PRACTITIONER

## 2023-11-29 PROCEDURE — G8427 DOCREV CUR MEDS BY ELIG CLIN: HCPCS | Performed by: NURSE PRACTITIONER

## 2023-11-29 PROCEDURE — 1123F ACP DISCUSS/DSCN MKR DOCD: CPT | Performed by: NURSE PRACTITIONER

## 2023-11-29 PROCEDURE — 4004F PT TOBACCO SCREEN RCVD TLK: CPT | Performed by: NURSE PRACTITIONER

## 2023-11-29 PROCEDURE — 3074F SYST BP LT 130 MM HG: CPT | Performed by: NURSE PRACTITIONER

## 2023-11-29 RX ORDER — GABAPENTIN 100 MG/1
CAPSULE ORAL
Qty: 90 CAPSULE | Refills: 2 | Status: SHIPPED | OUTPATIENT
Start: 2023-11-29 | End: 2024-02-29

## 2023-11-29 ASSESSMENT — SLEEP AND FATIGUE QUESTIONNAIRES
HOW LIKELY ARE YOU TO NOD OFF OR FALL ASLEEP WHILE SITTING QUIETLY AFTER LUNCH WITHOUT ALCOHOL: 1
HOW LIKELY ARE YOU TO NOD OFF OR FALL ASLEEP IN A CAR, WHILE STOPPED FOR A FEW MINUTES IN TRAFFIC: 0
HOW LIKELY ARE YOU TO NOD OFF OR FALL ASLEEP WHILE WATCHING TV: 0
HOW LIKELY ARE YOU TO NOD OFF OR FALL ASLEEP WHILE SITTING AND READING: 2
HOW LIKELY ARE YOU TO NOD OFF OR FALL ASLEEP WHILE SITTING INACTIVE IN A PUBLIC PLACE: 1
HOW LIKELY ARE YOU TO NOD OFF OR FALL ASLEEP WHILE LYING DOWN TO REST IN THE AFTERNOON WHEN CIRCUMSTANCES PERMIT: 3
HOW LIKELY ARE YOU TO NOD OFF OR FALL ASLEEP WHEN YOU ARE A PASSENGER IN A CAR FOR AN HOUR WITHOUT A BREAK: 0
ESS TOTAL SCORE: 7
HOW LIKELY ARE YOU TO NOD OFF OR FALL ASLEEP WHILE SITTING AND TALKING TO SOMEONE: 0

## 2023-11-29 NOTE — ASSESSMENT & PLAN NOTE
Chronic- Stable. Discussed the importance of treating sleep apnea as part of the management of this disorder. Cont any meds per PCP and other physicians. He will continue 2-3 cap nightly to help with sleep/back pain. Monitor dizziness next day and advised him not to take more than 2 caps nightly if dizziness persists or having issues with balance next day. Instructed to stop taking gabapentin if worsening of dizziness/balance issues next day and contact the office. Advised him to stick with gabapetin for now, not to take both back and forth. He will return in 2 mo for follow up. Discussed side effects/adverse effects of medication and he was advised to go to ED if experience adverse effects of medication. Call the pharmacy or the office with questions regarding side effects. Medication contract form reviewed and signed by the patient today.

## 2023-11-29 NOTE — ASSESSMENT & PLAN NOTE
Chronic - Stable: Reviewed and analyzed results of physiologic download from patient's machine and reviewed with patients. Supplies and parts as needed for the machine. These are medically necessary. Limit caffeine use after 3 PM. Based on the analyzed data, we will continue with current settings. Continues to do well with his machine. He is compliant and getting good symptom control. Stable on the current settings. He will return in 2 mo for follow up for PAP therapy/medication management. He was informed that compliance with PAP therapy to treat his sleep apnea is a part of medication contract. He verbalized understanding. Encouraged him to continue to use his machine each night. Encouraged the patient to contact the office with any questions or concerns.

## 2023-11-29 NOTE — TELEPHONE ENCOUNTER
Pt called and asked when his order for his medication will be sent. Pt does not have transportation so he may need it delivered depending on when it will be sent. Medication was Gabapentin.      [phone]

## 2023-12-26 DIAGNOSIS — G47.01 INSOMNIA DUE TO MEDICAL CONDITION: ICD-10-CM

## 2023-12-26 RX ORDER — GABAPENTIN 100 MG/1
CAPSULE ORAL
Qty: 270 CAPSULE | Refills: 0 | Status: SHIPPED | OUTPATIENT
Start: 2023-12-26 | End: 2024-03-27

## 2023-12-26 NOTE — TELEPHONE ENCOUNTER
PDMP reviewed with no concerns for misuse or diversion. Last Rx written and filled on 11/29/23 for 30 days supply with no refills. Will call in 90 day supply per insurance request. F/u scheduled for 1/17/24.

## 2023-12-26 NOTE — TELEPHONE ENCOUNTER
Medication:   Requested Prescriptions     Pending Prescriptions Disp Refills    gabapentin (NEURONTIN) 100 MG capsule 270 capsule 1     Si-3 cap PO, QHS. Max of 3 cap PO QHS     Last Filled:  23 last fill was 1mos.  Supply- insurance requesting 90 day    Last appt: 2023   Next appt: 2024    Last OARRS:        No data to display

## 2024-01-10 ENCOUNTER — OFFICE VISIT (OUTPATIENT)
Dept: NEUROLOGY | Age: 74
End: 2024-01-10

## 2024-01-10 VITALS
HEIGHT: 69 IN | DIASTOLIC BLOOD PRESSURE: 86 MMHG | HEART RATE: 87 BPM | BODY MASS INDEX: 28.88 KG/M2 | SYSTOLIC BLOOD PRESSURE: 109 MMHG | WEIGHT: 195 LBS

## 2024-01-10 DIAGNOSIS — I10 HTN (HYPERTENSION), BENIGN: ICD-10-CM

## 2024-01-10 DIAGNOSIS — G40.109 PARTIAL SYMPTOMATIC EPILEPSY WITH SIMPLE PARTIAL SEIZURES, NOT INTRACTABLE, WITHOUT STATUS EPILEPTICUS (HCC): Primary | ICD-10-CM

## 2024-01-10 DIAGNOSIS — G45.1 TIA INVOLVING LEFT INTERNAL CAROTID ARTERY: ICD-10-CM

## 2024-01-10 DIAGNOSIS — G47.33 OSA (OBSTRUCTIVE SLEEP APNEA): ICD-10-CM

## 2024-01-10 DIAGNOSIS — R25.1 TREMOR: ICD-10-CM

## 2024-01-10 NOTE — PROGRESS NOTES
he patient came today for follow up regarding: History of seizure and chronic tremors.      This is my first encounter with the patient.  The patient was seen by Dr. Card.  I was able to review the patient's record, imaging, notes from different physicians and recent events.    Patient has history of seizure.  Description right sided shaking followed by complex partial seizure with confusion and alteration of OCTAVIO for few minutes.  Last seizure was in 2013.  He is on VPA and he stopped taking such medication last month. He was concerned about possible SE. He was dx with TIA in 10-23. MRI brain at that time showed no stroke. Hx of IFEANYI and insomnia. No new sx today. No severe tremors. No LOC or OCTAVIO. Other ROS was negative.       Exam:   Constitutional:   Vitals:    01/10/24 1125   BP: 109/86   Pulse: 87   Weight: 88.5 kg (195 lb)   Height: 1.753 m (5' 9.02\")       General appearance:  Normal development and appear in no acute distress.   Mental Status:   Oriented to person, place, problem, and time.    Memory: Good immediate recall.  Intact remote memory  Normal attention span and concentration.  Language: intact naming, repeating and fluency   Good fund of Knowledge. Aware of current events and vocabulary   Cranial Nerves:   II: Pupils: equal, round, reactive to light  III,IV,VI: Extra Ocular Movements are intact. No nystagmus  V: Facial sensation is intact   VII: Facial strength and movements: intact and symmetric  XII: Tongue movements are normal  Musculoskeletal: 5/5 in all 4 extremities.   Tone: Normal tone.   Coordination: no tremors or drift  DTR: symmetric 2 in UL and LL  Sensation: normal to all modalities in both arms and legs.  Gait/Posture: steady gait     ROS : A 10-14 system review of constitutional, cardiovascular, respiratory, GI, eyes, , ENT, musculoskeletal, endocrine, hematological, skin, SHEENT, genitourinary, psychiatric and neurologic systems was obtained and updated today which is unremarkable

## 2024-02-26 ENCOUNTER — OFFICE VISIT (OUTPATIENT)
Dept: PULMONOLOGY | Age: 74
End: 2024-02-26
Payer: COMMERCIAL

## 2024-02-26 VITALS
HEIGHT: 69 IN | SYSTOLIC BLOOD PRESSURE: 130 MMHG | BODY MASS INDEX: 29.27 KG/M2 | HEART RATE: 80 BPM | OXYGEN SATURATION: 97 % | DIASTOLIC BLOOD PRESSURE: 88 MMHG | WEIGHT: 197.6 LBS

## 2024-02-26 DIAGNOSIS — E11.40 TYPE 2 DIABETES MELLITUS WITH DIABETIC NEUROPATHY, UNSPECIFIED WHETHER LONG TERM INSULIN USE (HCC): Chronic | ICD-10-CM

## 2024-02-26 DIAGNOSIS — I25.10 CORONARY ARTERY DISEASE DUE TO LIPID RICH PLAQUE: Chronic | ICD-10-CM

## 2024-02-26 DIAGNOSIS — I10 ESSENTIAL HYPERTENSION: Chronic | ICD-10-CM

## 2024-02-26 DIAGNOSIS — G47.01 INSOMNIA DUE TO MEDICAL CONDITION: Chronic | ICD-10-CM

## 2024-02-26 DIAGNOSIS — I25.83 CORONARY ARTERY DISEASE DUE TO LIPID RICH PLAQUE: Chronic | ICD-10-CM

## 2024-02-26 DIAGNOSIS — G47.33 OSA (OBSTRUCTIVE SLEEP APNEA): Primary | Chronic | ICD-10-CM

## 2024-02-26 PROCEDURE — 99214 OFFICE O/P EST MOD 30 MIN: CPT | Performed by: INTERNAL MEDICINE

## 2024-02-26 PROCEDURE — 3075F SYST BP GE 130 - 139MM HG: CPT | Performed by: INTERNAL MEDICINE

## 2024-02-26 PROCEDURE — 3079F DIAST BP 80-89 MM HG: CPT | Performed by: INTERNAL MEDICINE

## 2024-02-26 PROCEDURE — 1123F ACP DISCUSS/DSCN MKR DOCD: CPT | Performed by: INTERNAL MEDICINE

## 2024-02-26 ASSESSMENT — SLEEP AND FATIGUE QUESTIONNAIRES
HOW LIKELY ARE YOU TO NOD OFF OR FALL ASLEEP IN A CAR, WHILE STOPPED FOR A FEW MINUTES IN TRAFFIC: 0
HOW LIKELY ARE YOU TO NOD OFF OR FALL ASLEEP WHILE SITTING QUIETLY AFTER LUNCH WITHOUT ALCOHOL: 1
HOW LIKELY ARE YOU TO NOD OFF OR FALL ASLEEP WHILE SITTING INACTIVE IN A PUBLIC PLACE: 0
HOW LIKELY ARE YOU TO NOD OFF OR FALL ASLEEP WHILE SITTING AND TALKING TO SOMEONE: 0
HOW LIKELY ARE YOU TO NOD OFF OR FALL ASLEEP WHILE LYING DOWN TO REST IN THE AFTERNOON WHEN CIRCUMSTANCES PERMIT: 2
HOW LIKELY ARE YOU TO NOD OFF OR FALL ASLEEP WHILE WATCHING TV: 0
HOW LIKELY ARE YOU TO NOD OFF OR FALL ASLEEP WHILE SITTING AND READING: 1
ESS TOTAL SCORE: 4
HOW LIKELY ARE YOU TO NOD OFF OR FALL ASLEEP WHEN YOU ARE A PASSENGER IN A CAR FOR AN HOUR WITHOUT A BREAK: 0

## 2024-02-26 NOTE — ASSESSMENT & PLAN NOTE
Chronic-stable:  Feels his sleep is doing okay without gabapentin for now.  Will continue without any meds for now.  PDMP report was reviewed to day.

## 2024-02-26 NOTE — ASSESSMENT & PLAN NOTE
Chronic-Stable: Reviewed and analyzed results of physiologic download from patient's machine and reviewed with patient.  Supplies and parts as needed for his machine.  These are medically necessary.  Limit caffeine use after 3pm. Based on the analyzed data will continue with current settings

## 2024-02-26 NOTE — PROGRESS NOTES
Quincy Seymour CNP  Mary Carmen Gra CNP [place]  [address]  [address]  [place]  [address]  P- [phone]   [facility]  [address]  [address]  [place] [address]  F- [phone]     [facility] PHYSICIANS [place] [facility] SLEEP [facility]  [address]  [address]  [facility] [address]  Dept: [phone]  Dept Fax: [phone]  Loc: [phone]      Assessment/Plan:      1. IFEANYI (obstructive sleep apnea)  Assessment & Plan:  Chronic-Stable: Reviewed and analyzed results of physiologic download from patient's machine and reviewed with patient.  Supplies and parts as needed for his machine.  These are medically necessary.  Limit caffeine use after 3pm. Based on the analyzed data will continue with current settings   2. Coronary artery disease due to lipid rich plaque  Assessment & Plan:  Chronic- Stable.  Discussed the importance of treating sleep apnea as part of the management of this disorder.  Cont any meds per PCP and other physicians.   3. Essential hypertension  Assessment & Plan:  Chronic- Stable.  Discussed the importance of treating sleep apnea as part of the management of this disorder.  Cont any meds per PCP and other physicians.   4. Type 2 diabetes mellitus with diabetic neuropathy, unspecified whether long term insulin use (HCC)  Assessment & Plan:  Chronic- Stable.  Discussed the importance of treating sleep apnea as part of the management of this disorder.  Cont any meds per PCP and other physicians.   5. Insomnia due to medical condition  Assessment & Plan:  Chronic-stable:  Feels his sleep is doing okay without gabapentin for now.  Will continue without any meds for now.  PDMP report was reviewed to day.         Reviewed, analyzed, and documented physiologic data from patient's PAP machine.    This information was analyzed to assess complexity and medical decision making in regards to further testing and

## 2024-02-26 NOTE — ASSESSMENT & PLAN NOTE
Chronic- Stable.  Discussed the importance of treating sleep apnea as part of the management of this disorder.  Cont any meds per PCP and other physicians.

## 2024-02-27 ENCOUNTER — TELEPHONE (OUTPATIENT)
Dept: PULMONOLOGY | Age: 74
End: 2024-02-27

## 2024-02-27 NOTE — TELEPHONE ENCOUNTER
LVM x3 notifying patient he left cpap machine in hallway bench outside office. Placed machine at front check in with tag accompanying for .

## 2024-03-18 ENCOUNTER — TELEPHONE (OUTPATIENT)
Dept: CASE MANAGEMENT | Age: 74
End: 2024-03-18

## 2024-05-29 ENCOUNTER — TELEPHONE (OUTPATIENT)
Dept: CASE MANAGEMENT | Age: 74
End: 2024-05-29

## 2024-08-17 ENCOUNTER — APPOINTMENT (OUTPATIENT)
Dept: GENERAL RADIOLOGY | Age: 74
DRG: 194 | End: 2024-08-17
Payer: MEDICARE

## 2024-08-17 ENCOUNTER — APPOINTMENT (OUTPATIENT)
Dept: CT IMAGING | Age: 74
DRG: 194 | End: 2024-08-17
Payer: MEDICARE

## 2024-08-17 ENCOUNTER — HOSPITAL ENCOUNTER (INPATIENT)
Age: 74
LOS: 3 days | Discharge: NURSING, REHAB OR LONG-TERM CARE | DRG: 194 | End: 2024-08-21
Attending: INTERNAL MEDICINE | Admitting: INTERNAL MEDICINE
Payer: MEDICARE

## 2024-08-17 DIAGNOSIS — R26.2 UNABLE TO AMBULATE: Primary | ICD-10-CM

## 2024-08-17 DIAGNOSIS — E87.1 HYPONATREMIA: ICD-10-CM

## 2024-08-17 DIAGNOSIS — M25.552 ACUTE HIP PAIN, LEFT: ICD-10-CM
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
## 2024-08-17 DIAGNOSIS — R74.8 ELEVATED CK: ICD-10-CM

## 2024-08-17 DIAGNOSIS — E83.42 HYPOMAGNESEMIA: ICD-10-CM

## 2024-08-17 DIAGNOSIS — A41.9 SEPSIS, DUE TO UNSPECIFIED ORGANISM, UNSPECIFIED WHETHER ACUTE ORGAN DYSFUNCTION PRESENT (HCC): ICD-10-CM

## 2024-08-17 DIAGNOSIS — S80.01XA CONTUSION OF RIGHT KNEE, INITIAL ENCOUNTER: ICD-10-CM

## 2024-08-17 DIAGNOSIS — S80.02XA CONTUSION OF LEFT KNEE, INITIAL ENCOUNTER: ICD-10-CM

## 2024-08-17 DIAGNOSIS — S29.019A THORACIC MYOFASCIAL STRAIN, INITIAL ENCOUNTER: ICD-10-CM

## 2024-08-17 DIAGNOSIS — R53.1 GENERALIZED WEAKNESS: ICD-10-CM

## 2024-08-17 PROBLEM — R29.6 FREQUENT FALLS: Status: ACTIVE | Noted: 2024-08-17

## 2024-08-17 LAB
ANION GAP SERPL CALCULATED.3IONS-SCNC: 16 MMOL/L (ref 3–16)
BACTERIA URNS QL MICRO: ABNORMAL /HPF
BASOPHILS # BLD: 0 K/UL (ref 0–0.2)
BASOPHILS NFR BLD: 0.3 %
BILIRUB UR QL STRIP.AUTO: ABNORMAL
BUN SERPL-MCNC: 29 MG/DL (ref 7–20)
CALCIUM SERPL-MCNC: 9.8 MG/DL (ref 8.3–10.6)
CHLORIDE SERPL-SCNC: 95 MMOL/L (ref 99–110)
CK SERPL-CCNC: 697 U/L (ref 39–308)
CLARITY UR: ABNORMAL
CO2 SERPL-SCNC: 23 MMOL/L (ref 21–32)
COLOR UR: ABNORMAL
CREAT SERPL-MCNC: 1.3 MG/DL (ref 0.8–1.3)
DEPRECATED RDW RBC AUTO: 13.7 % (ref 12.4–15.4)
EOSINOPHIL # BLD: 0 K/UL (ref 0–0.6)
EOSINOPHIL NFR BLD: 0 %
EPI CELLS #/AREA URNS AUTO: 5 /HPF (ref 0–5)
ETHANOLAMINE SERPL-MCNC: NORMAL MG/DL (ref 0–0.08)
FLUAV RNA RESP QL NAA+PROBE: NOT DETECTED
FLUBV RNA RESP QL NAA+PROBE: NOT DETECTED
GFR SERPLBLD CREATININE-BSD FMLA CKD-EPI: 58 ML/MIN/{1.73_M2}
GLUCOSE BLD-MCNC: 159 MG/DL (ref 70–99)
GLUCOSE SERPL-MCNC: 186 MG/DL (ref 70–99)
GLUCOSE UR STRIP.AUTO-MCNC: 500 MG/DL
HCT VFR BLD AUTO: 43.8 % (ref 40.5–52.5)
HGB BLD-MCNC: 14.5 G/DL (ref 13.5–17.5)
HGB UR QL STRIP.AUTO: NEGATIVE
HYALINE CASTS #/AREA URNS AUTO: 20 /LPF (ref 0–8)
HYALINE CASTS: PRESENT
KETONES UR STRIP.AUTO-MCNC: 15 MG/DL
LEUKOCYTE ESTERASE UR QL STRIP.AUTO: ABNORMAL
LYMPHOCYTES # BLD: 1.1 K/UL (ref 1–5.1)
LYMPHOCYTES NFR BLD: 12.6 %
MAGNESIUM SERPL-MCNC: 1.7 MG/DL (ref 1.8–2.4)
MCH RBC QN AUTO: 28.4 PG (ref 26–34)
MCHC RBC AUTO-ENTMCNC: 33.2 G/DL (ref 31–36)
MCV RBC AUTO: 85.6 FL (ref 80–100)
MONOCYTES # BLD: 1.5 K/UL (ref 0–1.3)
MONOCYTES NFR BLD: 17.2 %
MUCUS: PRESENT
NEUTROPHILS # BLD: 6 K/UL (ref 1.7–7.7)
NEUTROPHILS NFR BLD: 69.9 %
NITRITE UR QL STRIP.AUTO: NEGATIVE
PERFORMED ON: ABNORMAL
PH UR STRIP.AUTO: 5.5 [PH] (ref 5–8)
PHOSPHATE SERPL-MCNC: 3 MG/DL (ref 2.5–4.9)
PLATELET # BLD AUTO: 158 K/UL (ref 135–450)
PMV BLD AUTO: 9.1 FL (ref 5–10.5)
POTASSIUM SERPL-SCNC: 4.1 MMOL/L (ref 3.5–5.1)
PROT UR STRIP.AUTO-MCNC: 100 MG/DL
RBC # BLD AUTO: 5.12 M/UL (ref 4.2–5.9)
RBC CLUMPS #/AREA URNS AUTO: 1 /HPF (ref 0–4)
SARS-COV-2 RNA RESP QL NAA+PROBE: NOT DETECTED
SODIUM SERPL-SCNC: 134 MMOL/L (ref 136–145)
SP GR UR STRIP.AUTO: >=1.03 (ref 1–1.03)
TROPONIN, HIGH SENSITIVITY: 18 NG/L (ref 0–22)
UA COMPLETE W REFLEX CULTURE PNL UR: ABNORMAL
UA DIPSTICK W REFLEX MICRO PNL UR: YES
URN SPEC COLLECT METH UR: ABNORMAL
UROBILINOGEN UR STRIP-ACNC: 1 E.U./DL
WBC # BLD AUTO: 8.6 K/UL (ref 4–11)
WBC #/AREA URNS AUTO: 1 /HPF (ref 0–5)

## 2024-08-17 PROCEDURE — 96366 THER/PROPH/DIAG IV INF ADDON: CPT

## 2024-08-17 PROCEDURE — 6360000002 HC RX W HCPCS: Performed by: INTERNAL MEDICINE

## 2024-08-17 PROCEDURE — G0378 HOSPITAL OBSERVATION PER HR: HCPCS

## 2024-08-17 PROCEDURE — 6360000004 HC RX CONTRAST MEDICATION: Performed by: INTERNAL MEDICINE

## 2024-08-17 PROCEDURE — 81001 URINALYSIS AUTO W/SCOPE: CPT

## 2024-08-17 PROCEDURE — 2580000003 HC RX 258: Performed by: INTERNAL MEDICINE

## 2024-08-17 PROCEDURE — 70496 CT ANGIOGRAPHY HEAD: CPT

## 2024-08-17 PROCEDURE — 87636 SARSCOV2 & INF A&B AMP PRB: CPT

## 2024-08-17 PROCEDURE — 72070 X-RAY EXAM THORAC SPINE 2VWS: CPT

## 2024-08-17 PROCEDURE — 99285 EMERGENCY DEPT VISIT HI MDM: CPT

## 2024-08-17 PROCEDURE — 82550 ASSAY OF CK (CPK): CPT

## 2024-08-17 PROCEDURE — 84484 ASSAY OF TROPONIN QUANT: CPT

## 2024-08-17 PROCEDURE — 84100 ASSAY OF PHOSPHORUS: CPT

## 2024-08-17 PROCEDURE — 80048 BASIC METABOLIC PNL TOTAL CA: CPT

## 2024-08-17 PROCEDURE — 83735 ASSAY OF MAGNESIUM: CPT

## 2024-08-17 PROCEDURE — 6370000000 HC RX 637 (ALT 250 FOR IP): Performed by: INTERNAL MEDICINE

## 2024-08-17 PROCEDURE — 70450 CT HEAD/BRAIN W/O DYE: CPT

## 2024-08-17 PROCEDURE — 96365 THER/PROPH/DIAG IV INF INIT: CPT

## 2024-08-17 PROCEDURE — 82077 ASSAY SPEC XCP UR&BREATH IA: CPT

## 2024-08-17 PROCEDURE — 96361 HYDRATE IV INFUSION ADD-ON: CPT

## 2024-08-17 PROCEDURE — 85025 COMPLETE CBC W/AUTO DIFF WBC: CPT

## 2024-08-17 PROCEDURE — 96375 TX/PRO/DX INJ NEW DRUG ADDON: CPT

## 2024-08-17 PROCEDURE — 96372 THER/PROPH/DIAG INJ SC/IM: CPT

## 2024-08-17 PROCEDURE — 73562 X-RAY EXAM OF KNEE 3: CPT

## 2024-08-17 PROCEDURE — 71045 X-RAY EXAM CHEST 1 VIEW: CPT

## 2024-08-17 RX ORDER — INSULIN LISPRO 100 [IU]/ML
0-8 INJECTION, SOLUTION INTRAVENOUS; SUBCUTANEOUS
Status: DISCONTINUED | OUTPATIENT
Start: 2024-08-17 | End: 2024-08-21 | Stop reason: HOSPADM

## 2024-08-17 RX ORDER — ACETAMINOPHEN 650 MG/1
650 SUPPOSITORY RECTAL EVERY 6 HOURS PRN
Status: DISCONTINUED | OUTPATIENT
Start: 2024-08-17 | End: 2024-08-21 | Stop reason: HOSPADM

## 2024-08-17 RX ORDER — ALBUTEROL SULFATE 90 UG/1
2 AEROSOL, METERED RESPIRATORY (INHALATION) EVERY 6 HOURS PRN
Status: DISCONTINUED | OUTPATIENT
Start: 2024-08-17 | End: 2024-08-21 | Stop reason: HOSPADM

## 2024-08-17 RX ORDER — LISINOPRIL 20 MG/1
20 TABLET ORAL DAILY
Status: DISCONTINUED | OUTPATIENT
Start: 2024-08-17 | End: 2024-08-21 | Stop reason: HOSPADM

## 2024-08-17 RX ORDER — ATORVASTATIN CALCIUM 40 MG/1
40 TABLET, FILM COATED ORAL NIGHTLY
Status: DISCONTINUED | OUTPATIENT
Start: 2024-08-17 | End: 2024-08-21 | Stop reason: HOSPADM

## 2024-08-17 RX ORDER — SODIUM CHLORIDE 0.9 % (FLUSH) 0.9 %
5-40 SYRINGE (ML) INJECTION EVERY 12 HOURS SCHEDULED
Status: DISCONTINUED | OUTPATIENT
Start: 2024-08-17 | End: 2024-08-21 | Stop reason: HOSPADM

## 2024-08-17 RX ORDER — MAGNESIUM SULFATE IN WATER 40 MG/ML
2000 INJECTION, SOLUTION INTRAVENOUS ONCE
Status: COMPLETED | OUTPATIENT
Start: 2024-08-17 | End: 2024-08-17

## 2024-08-17 RX ORDER — FENTANYL CITRATE 50 UG/ML
25 INJECTION, SOLUTION INTRAMUSCULAR; INTRAVENOUS ONCE
Status: COMPLETED | OUTPATIENT
Start: 2024-08-17 | End: 2024-08-17

## 2024-08-17 RX ORDER — MAGNESIUM HYDROXIDE/ALUMINUM HYDROXICE/SIMETHICONE 120; 1200; 1200 MG/30ML; MG/30ML; MG/30ML
5 SUSPENSION ORAL EVERY 6 HOURS PRN
Status: DISCONTINUED | OUTPATIENT
Start: 2024-08-17 | End: 2024-08-21 | Stop reason: HOSPADM

## 2024-08-17 RX ORDER — SODIUM CHLORIDE 9 MG/ML
INJECTION, SOLUTION INTRAVENOUS PRN
Status: DISCONTINUED | OUTPATIENT
Start: 2024-08-17 | End: 2024-08-21 | Stop reason: HOSPADM

## 2024-08-17 RX ORDER — ACETAMINOPHEN 325 MG/1
650 TABLET ORAL EVERY 6 HOURS PRN
Status: DISCONTINUED | OUTPATIENT
Start: 2024-08-17 | End: 2024-08-21 | Stop reason: HOSPADM

## 2024-08-17 RX ORDER — SODIUM CHLORIDE, SODIUM LACTATE, POTASSIUM CHLORIDE, CALCIUM CHLORIDE 600; 310; 30; 20 MG/100ML; MG/100ML; MG/100ML; MG/100ML
INJECTION, SOLUTION INTRAVENOUS CONTINUOUS
Status: DISCONTINUED | OUTPATIENT
Start: 2024-08-17 | End: 2024-08-20

## 2024-08-17 RX ORDER — DEXTROSE MONOHYDRATE 100 MG/ML
INJECTION, SOLUTION INTRAVENOUS CONTINUOUS PRN
Status: DISCONTINUED | OUTPATIENT
Start: 2024-08-17 | End: 2024-08-21 | Stop reason: HOSPADM

## 2024-08-17 RX ORDER — PANTOPRAZOLE SODIUM 40 MG/1
40 TABLET, DELAYED RELEASE ORAL
Status: DISCONTINUED | OUTPATIENT
Start: 2024-08-18 | End: 2024-08-21 | Stop reason: HOSPADM

## 2024-08-17 RX ORDER — INSULIN LISPRO 100 [IU]/ML
0-4 INJECTION, SOLUTION INTRAVENOUS; SUBCUTANEOUS NIGHTLY
Status: DISCONTINUED | OUTPATIENT
Start: 2024-08-17 | End: 2024-08-21 | Stop reason: HOSPADM

## 2024-08-17 RX ORDER — SODIUM CHLORIDE 0.9 % (FLUSH) 0.9 %
5-40 SYRINGE (ML) INJECTION PRN
Status: DISCONTINUED | OUTPATIENT
Start: 2024-08-17 | End: 2024-08-21 | Stop reason: HOSPADM

## 2024-08-17 RX ORDER — POLYETHYLENE GLYCOL 3350 17 G/17G
17 POWDER, FOR SOLUTION ORAL DAILY PRN
Status: DISCONTINUED | OUTPATIENT
Start: 2024-08-17 | End: 2024-08-21 | Stop reason: HOSPADM

## 2024-08-17 RX ORDER — 0.9 % SODIUM CHLORIDE 0.9 %
500 INTRAVENOUS SOLUTION INTRAVENOUS ONCE
Status: COMPLETED | OUTPATIENT
Start: 2024-08-17 | End: 2024-08-17

## 2024-08-17 RX ORDER — ASPIRIN 81 MG/1
81 TABLET ORAL DAILY
Status: DISCONTINUED | OUTPATIENT
Start: 2024-08-18 | End: 2024-08-21 | Stop reason: HOSPADM

## 2024-08-17 RX ORDER — NICOTINE 21 MG/24HR
1 PATCH, TRANSDERMAL 24 HOURS TRANSDERMAL DAILY
Status: DISCONTINUED | OUTPATIENT
Start: 2024-08-17 | End: 2024-08-21 | Stop reason: HOSPADM

## 2024-08-17 RX ORDER — ENOXAPARIN SODIUM 100 MG/ML
40 INJECTION SUBCUTANEOUS DAILY
Status: DISCONTINUED | OUTPATIENT
Start: 2024-08-17 | End: 2024-08-21 | Stop reason: HOSPADM

## 2024-08-17 RX ORDER — TAMSULOSIN HYDROCHLORIDE 0.4 MG/1
0.4 CAPSULE ORAL EVERY EVENING
Status: DISCONTINUED | OUTPATIENT
Start: 2024-08-17 | End: 2024-08-21 | Stop reason: HOSPADM

## 2024-08-17 RX ADMIN — TAMSULOSIN HYDROCHLORIDE 0.4 MG: 0.4 CAPSULE ORAL at 21:03

## 2024-08-17 RX ADMIN — IOPAMIDOL 75 ML: 755 INJECTION, SOLUTION INTRAVENOUS at 13:29

## 2024-08-17 RX ADMIN — MAGNESIUM SULFATE HEPTAHYDRATE 2000 MG: 40 INJECTION, SOLUTION INTRAVENOUS at 14:54

## 2024-08-17 RX ADMIN — SODIUM CHLORIDE, POTASSIUM CHLORIDE, SODIUM LACTATE AND CALCIUM CHLORIDE: 600; 310; 30; 20 INJECTION, SOLUTION INTRAVENOUS at 19:36

## 2024-08-17 RX ADMIN — ACETAMINOPHEN 650 MG: 325 TABLET ORAL at 19:33

## 2024-08-17 RX ADMIN — ATORVASTATIN CALCIUM 40 MG: 40 TABLET, FILM COATED ORAL at 21:03

## 2024-08-17 RX ADMIN — FENTANYL CITRATE 25 MCG: 0.05 INJECTION, SOLUTION INTRAMUSCULAR; INTRAVENOUS at 12:46

## 2024-08-17 RX ADMIN — ENOXAPARIN SODIUM 40 MG: 100 INJECTION SUBCUTANEOUS at 21:03

## 2024-08-17 RX ADMIN — SODIUM CHLORIDE 500 ML: 9 INJECTION, SOLUTION INTRAVENOUS at 12:45

## 2024-08-17 RX ADMIN — LISINOPRIL 20 MG: 20 TABLET ORAL at 21:03

## 2024-08-17 ASSESSMENT — PAIN - FUNCTIONAL ASSESSMENT: PAIN_FUNCTIONAL_ASSESSMENT: 0-10

## 2024-08-17 ASSESSMENT — LIFESTYLE VARIABLES
HOW MANY STANDARD DRINKS CONTAINING ALCOHOL DO YOU HAVE ON A TYPICAL DAY: PATIENT DOES NOT DRINK
HOW OFTEN DO YOU HAVE A DRINK CONTAINING ALCOHOL: NEVER

## 2024-08-17 ASSESSMENT — PAIN DESCRIPTION - LOCATION: LOCATION: GENERALIZED

## 2024-08-17 ASSESSMENT — PAIN SCALES - GENERAL
PAINLEVEL_OUTOF10: 4
PAINLEVEL_OUTOF10: 0

## 2024-08-17 NOTE — ACP (ADVANCE CARE PLANNING)
Advanced Care Planning Note.    Purpose of Encounter: Advanced care planning in light of hospitalization  Parties In Attendance: Patient,    Decisional Capacity: Yes  Subjective: Patient  understand that this conversation is to address long term care goal  Objective: Patient admitted to the hospital with frequent falls and deconditioning  Goals of Care Determination: Patient would pursue CPR and Intubation if required.  Code Status: full code  Time spent on Advanced care Plannin minutes  Advanced Care Planning Documents: documented patient's wishes, would like Sister Shyanne to make medical decisions if unable to make decisions    Marcell Hirsch MD  2024 5:29 PM

## 2024-08-17 NOTE — PROGRESS NOTES
4 Eyes Skin Assessment     NAME:  Robinson Pascual  YOB: 1950  MEDICAL RECORD NUMBER:  [id number]    The patient is being assessed for  Admission    I agree that at least one RN has performed a thorough Head to Toe Skin Assessment on the patient. ALL assessment sites listed below have been assessed.      Areas assessed by both nurses:    Head, Face, Ears, Shoulders, Back, Chest, Arms, Elbows, Hands, Sacrum. Buttock, Coccyx, Ischium, and Legs. Feet and Heels        Does the Patient have a Wound? No noted wound(s)       Jaden Prevention initiated by RN: Yes  Wound Care Orders initiated by RN: No    Pressure Injury (Stage 3,4, Unstageable, DTI, NWPT, and Complex wounds) if present, place Wound referral order by RN under : No    New Ostomies, if present place, Ostomy referral order under : No     Nurse 1 eSignature: Electronically signed by Riya Mathew RN on 8/17/24 at 6:59 PM EDT    **SHARE this note so that the co-signing nurse can place an eSignature**    Nurse 2 eSignature: Electronically signed by Jelly Donis RN on 8/18/24 at 7:32 AM EDT

## 2024-08-17 NOTE — ED PROVIDER NOTES
EMERGENCY MEDICINE PROVIDER NOTE    Patient Identification  Pt Name: Robinson Pascual  MRN: [id number]  Birthdate 1950  Date of evaluation: 8/17/2024  Provider: DOROTEO CAMPBELL DO  PCP: Rashad Her MD    Chief Complaint  Fatigue (Pt brought in per [facility] from home pt slipped last night, fell down, feels weak today. Per EMS house is a hoarding environment. Symptoms onset a week ago, started with diarrhea, diaphoresis, no ability to check temp, pt denies diarrhea x 48 hours.  Reports muscle fatigue, aches, pains.  Lives alone. )      HPI  (History provided by patient)  This is a 73 y.o. male who was brought in by EMS transportation for generalized weakness that started a week ago.  Patient states the weakness is generalized and does not have any focal weakness.  Patient did have some nausea and vomiting.  Patient does complain of generalized muscle aches.  Patient did trip and fall yesterday and has been on the floor since.  His friend has not heard from them so he called EMS.  Patient does live in a hoarding situation.  Patient is also complaining of bilateral knee pain and left hip pain.  Patient denies headache.    I have reviewed the following nursing documentation:  Allergies: Penicillins, Benadryl [diphenhydramine hcl], Erythromycin, and Iodine    Past medical history:   Past Medical History:   Diagnosis Date    Anxiety     BPH (benign prostatic hyperplasia)     CAD (coronary artery disease) 10/25/2013    s/p angioplsty 2    Chronic back pain oct 2008    in [facility]    DDD (degenerative disc disease), lumbosacral 1/7/2013    Depression     Eosinophilic granuloma (HCC)     sp biopsy 10 years by Dr Sifuentes    Epilepsy ([facility])     Hyperlipidemia     Hypertension     Irritable bowel syndrome     Myofascial pain syndrome 1/7/2013    Obstructive sleep apnea (adult) (pediatric) 10/9/2014    Type II or unspecified type diabetes mellitus without mention of complication, not stated as uncontrolled      Past surgical  Myofascial pain syndrome (1/7/2013), Obstructive sleep apnea (adult) (pediatric) (10/9/2014), and Type II or unspecified type diabetes mellitus without mention of complication, not stated as uncontrolled.    MDM and ED Course  Patient is neurologically intact.  X-ray of both knees did not show any acute fracture or dislocation.  CT of the head without contrast did not show any acute findings.  CTA of the head and neck shows moderate stenosis at the A4 segment of the right VIVI.  Patient also has 50% stenosis of the cavernous/supraglenoid segments on the right internal carotid artery.  CT also mention groundglass attenuation and septal thickening of the lung apices.  X-ray of the thoracic spine did not show any acute findings.  X-ray of the chest did not show any acute findings.  Sodium is found to be mildly low at 134 and chloride was mildly low at 9 5.  CK is elevated at 697 but at this level I would not suspect rhabdomyolysis.  Magnesium is 1.7 and replacement was started.  Patient is too weak to sit on the side of bed and did ask for help to sit on the side of the bed so he could urinate.  Patient is unsafe going home.  Report was that the patient lives in a hoarding situation.  Patient does live at home.  I believe the patient does need to be admitted for further evaluation and treatment.  Patient understands the plan.  A call was placed to hospitalist who excepted the admission.      Critical Care Time: 35 Minutes  This excludes separately billable procedures.      Final Impression  1. Unable to ambulate    2. Fall, initial encounter    3. Generalized weakness    4. Hyponatremia    5. Contusion of right knee, initial encounter    6. Contusion of left knee, initial encounter    7. Acute hip pain, left    8. Hypomagnesemia    9. Elevated CK    10. Thoracic myofascial strain, initial encounter        Blood pressure 128/74, pulse 93, temperature 99.7 °F (37.6 °C), temperature source Oral, resp. rate 20, height 1.753

## 2024-08-17 NOTE — H&P
HOSPITALISTS HISTORY AND PHYSICAL    8/17/2024 5:26 PM    Patient Information:  ROBINSON JUSTIN is a 73 y.o. male [id number]  PCP:  Rashad Her MD (Tel: [phone] )    Chief complaint:    Chief Complaint   Patient presents with    Fatigue     Pt brought in per [facility] from home pt slipped last night, fell down, feels weak today. Per EMS house is a hoarding environment. Symptoms onset a week ago, started with diarrhea, diaphoresis, no ability to check temp, pt denies diarrhea x 48 hours.  Reports muscle fatigue, aches, pains.  Lives alone.      History of Present Illness:  Robinson Justin is a 73 y.o. male who has been feeling fatigued for the last couple days feels like he had the flu has a slight cough that is nonproductive some chills and weakness and muscle aches.  Denies any significant diarrhea nausea vomiting.  Patient had multiple falls over the last couple weeks yesterday tripped and fell and was not able to get patient lives alone by himself friend had not heard from him thus called EMS and patient was found on the ground and brought to the ED.  Denies loc chest pain or sob  REVIEW OF SYSTEMS:   ENT: Negative for rhinorrhea, epistaxis, hoarseness, sore throat.  Respiratory: Negative for shortness of breath,wheezing  Cardiovascular: Negative for chest pain, palpitations   Gastrointestinal: Negative for nausea, vomiting, diarrhea  Genitourinary: Negative for polyuria, dysuria   Hematologic/Lymphatic: Negative for bleeding tendency, easy bruising  Neurologic: Negative for confusion,dysarthria.  Skin: Negative for itching,rash  Psychiatric: Negative for depression,anxiety, agitation.  Endocrine: Negative for polydipsia,polyuria,heat /cold intolerance.    Past Medical History:   has a past medical history of Anxiety, BPH (benign prostatic hyperplasia), CAD (coronary artery disease), Chronic back pain, DDD

## 2024-08-17 NOTE — ED NOTES
Pt arrived via EMS from home. Generalized weakness/pain. Recents falls at home over the last week. Diarrhea for a couple of days.    Patient alert and oriented x4.  GCS 15/15.  Skin appropriate for ethnicity, dry and intact.  No signs of acute distress noted at this time. Regular respiratory pattern, normal respiratory depth, unlabored respirations.   Generalized pain.     Patient placed on a continuous pulse oximetry and telemetry monitoring. Bedside Monitor on with Alarms audible and alarms set.  Patient on cycling blood pressure.     Patient oriented to room and ED throughput process.  Patient educated on all orders, including any medications.  Patient educated on chief complaint/symptoms. Patient encouraged to ask questions regarding care, medications or treatment plan.  Patient aware of how to reach staff with questions/concerns.  Safety measures and Fall risk precautions in place, ED bed locked in lowest position, bed side table within reach, bed alarm on, and call light in reach.  Plan of care ongoing.  Patient expresses no other needs at this time.

## 2024-08-18 PROBLEM — R50.9 FEVER AND CHILLS: Status: ACTIVE | Noted: 2024-08-18

## 2024-08-18 LAB
ANION GAP SERPL CALCULATED.3IONS-SCNC: 14 MMOL/L (ref 3–16)
BASOPHILS # BLD: 0 K/UL (ref 0–0.2)
BASOPHILS NFR BLD: 0.3 %
BUN SERPL-MCNC: 32 MG/DL (ref 7–20)
CALCIUM SERPL-MCNC: 9 MG/DL (ref 8.3–10.6)
CHLORIDE SERPL-SCNC: 100 MMOL/L (ref 99–110)
CK SERPL-CCNC: 394 U/L (ref 39–308)
CO2 SERPL-SCNC: 20 MMOL/L (ref 21–32)
CREAT SERPL-MCNC: 1.5 MG/DL (ref 0.8–1.3)
DEPRECATED RDW RBC AUTO: 13.9 % (ref 12.4–15.4)
EOSINOPHIL # BLD: 0 K/UL (ref 0–0.6)
EOSINOPHIL NFR BLD: 0 %
FOLATE SERPL-MCNC: 6.07 NG/ML (ref 4.78–24.2)
GFR SERPLBLD CREATININE-BSD FMLA CKD-EPI: 49 ML/MIN/{1.73_M2}
GLUCOSE BLD-MCNC: 176 MG/DL (ref 70–99)
GLUCOSE BLD-MCNC: 233 MG/DL (ref 70–99)
GLUCOSE BLD-MCNC: 234 MG/DL (ref 70–99)
GLUCOSE BLD-MCNC: 249 MG/DL (ref 70–99)
GLUCOSE SERPL-MCNC: 174 MG/DL (ref 70–99)
HCT VFR BLD AUTO: 40.5 % (ref 40.5–52.5)
HGB BLD-MCNC: 13.6 G/DL (ref 13.5–17.5)
LACTATE BLDV-SCNC: 2.2 MMOL/L (ref 0.4–1.9)
LYMPHOCYTES # BLD: 1.1 K/UL (ref 1–5.1)
LYMPHOCYTES NFR BLD: 13.6 %
MCH RBC QN AUTO: 28.6 PG (ref 26–34)
MCHC RBC AUTO-ENTMCNC: 33.5 G/DL (ref 31–36)
MCV RBC AUTO: 85.5 FL (ref 80–100)
MONOCYTES # BLD: 1.3 K/UL (ref 0–1.3)
MONOCYTES NFR BLD: 16.3 %
NEUTROPHILS # BLD: 5.5 K/UL (ref 1.7–7.7)
NEUTROPHILS NFR BLD: 69.8 %
PERFORMED ON: ABNORMAL
PLATELET # BLD AUTO: 150 K/UL (ref 135–450)
PMV BLD AUTO: 9.1 FL (ref 5–10.5)
POTASSIUM SERPL-SCNC: 4.2 MMOL/L (ref 3.5–5.1)
PROCALCITONIN SERPL IA-MCNC: 0.57 NG/ML (ref 0–0.15)
RBC # BLD AUTO: 4.74 M/UL (ref 4.2–5.9)
REPORT: NORMAL
RESP PATH DNA+RNA PNL NPH NAA+NON-PROBE: NORMAL
SODIUM SERPL-SCNC: 134 MMOL/L (ref 136–145)
TSH SERPL DL<=0.005 MIU/L-ACNC: 0.56 UIU/ML (ref 0.27–4.2)
VIT B12 SERPL-MCNC: 281 PG/ML (ref 211–911)
WBC # BLD AUTO: 7.9 K/UL (ref 4–11)

## 2024-08-18 PROCEDURE — 2580000003 HC RX 258: Performed by: INTERNAL MEDICINE

## 2024-08-18 PROCEDURE — G0378 HOSPITAL OBSERVATION PER HR: HCPCS

## 2024-08-18 PROCEDURE — 82607 VITAMIN B-12: CPT

## 2024-08-18 PROCEDURE — 94640 AIRWAY INHALATION TREATMENT: CPT

## 2024-08-18 PROCEDURE — 97162 PT EVAL MOD COMPLEX 30 MIN: CPT

## 2024-08-18 PROCEDURE — 6360000002 HC RX W HCPCS: Performed by: INTERNAL MEDICINE

## 2024-08-18 PROCEDURE — 36415 COLL VENOUS BLD VENIPUNCTURE: CPT

## 2024-08-18 PROCEDURE — 85025 COMPLETE CBC W/AUTO DIFF WBC: CPT

## 2024-08-18 PROCEDURE — 6370000000 HC RX 637 (ALT 250 FOR IP): Performed by: INTERNAL MEDICINE

## 2024-08-18 PROCEDURE — 94760 N-INVAS EAR/PLS OXIMETRY 1: CPT

## 2024-08-18 PROCEDURE — 83605 ASSAY OF LACTIC ACID: CPT

## 2024-08-18 PROCEDURE — 97530 THERAPEUTIC ACTIVITIES: CPT

## 2024-08-18 PROCEDURE — 84145 PROCALCITONIN (PCT): CPT

## 2024-08-18 PROCEDURE — 96372 THER/PROPH/DIAG INJ SC/IM: CPT

## 2024-08-18 PROCEDURE — 87040 BLOOD CULTURE FOR BACTERIA: CPT

## 2024-08-18 PROCEDURE — 1200000000 HC SEMI PRIVATE

## 2024-08-18 PROCEDURE — 82550 ASSAY OF CK (CPK): CPT

## 2024-08-18 PROCEDURE — 0202U NFCT DS 22 TRGT SARS-COV-2: CPT

## 2024-08-18 PROCEDURE — 82746 ASSAY OF FOLIC ACID SERUM: CPT

## 2024-08-18 PROCEDURE — 84443 ASSAY THYROID STIM HORMONE: CPT

## 2024-08-18 PROCEDURE — 80048 BASIC METABOLIC PNL TOTAL CA: CPT

## 2024-08-18 RX ADMIN — ENOXAPARIN SODIUM 40 MG: 100 INJECTION SUBCUTANEOUS at 07:58

## 2024-08-18 RX ADMIN — ASPIRIN 81 MG: 81 TABLET, COATED ORAL at 07:58

## 2024-08-18 RX ADMIN — ACETAMINOPHEN 650 MG: 325 TABLET ORAL at 01:36

## 2024-08-18 RX ADMIN — SODIUM CHLORIDE, POTASSIUM CHLORIDE, SODIUM LACTATE AND CALCIUM CHLORIDE: 600; 310; 30; 20 INJECTION, SOLUTION INTRAVENOUS at 10:39

## 2024-08-18 RX ADMIN — ATORVASTATIN CALCIUM 40 MG: 40 TABLET, FILM COATED ORAL at 20:22

## 2024-08-18 RX ADMIN — Medication 2 PUFF: at 17:03

## 2024-08-18 RX ADMIN — INSULIN LISPRO 2 UNITS: 100 INJECTION, SOLUTION INTRAVENOUS; SUBCUTANEOUS at 12:34

## 2024-08-18 RX ADMIN — INSULIN LISPRO 2 UNITS: 100 INJECTION, SOLUTION INTRAVENOUS; SUBCUTANEOUS at 16:34

## 2024-08-18 RX ADMIN — ACETAMINOPHEN 650 MG: 325 TABLET ORAL at 07:58

## 2024-08-18 RX ADMIN — PANTOPRAZOLE SODIUM 40 MG: 40 TABLET, DELAYED RELEASE ORAL at 05:34

## 2024-08-18 RX ADMIN — LISINOPRIL 20 MG: 20 TABLET ORAL at 07:58

## 2024-08-18 RX ADMIN — TAMSULOSIN HYDROCHLORIDE 0.4 MG: 0.4 CAPSULE ORAL at 16:34

## 2024-08-18 RX ADMIN — ACETAMINOPHEN 650 MG: 325 TABLET ORAL at 16:34

## 2024-08-18 NOTE — PROGRESS NOTES
Shift assessment complete, alert with intermittent confusion, febrile, tylenol given and hospitalist notified. Administered all night medications and all safety precautions in place.  The care plan and education has been reviewed and mutually agreed upon with the patient.

## 2024-08-18 NOTE — CARE COORDINATION
Case Management Note:    Patient admitted as Observation with an anticipated short hospitalization length of stay. Chart reviewed and it appears that patient has minimal needs for discharge at this time. Medical staff to inform case management team if discharge needs are identified.     PT/OT pendig.     Case management will continue to follow progress and update discharge plan as needed.     Electronically signed by Anna Smyth on 8/18/24 at 2:31 PM EDT

## 2024-08-18 NOTE — PROGRESS NOTES
[facility] - Inpatient Rehabilitation Department   Phone: [phone]    Physical Therapy    [x] Initial Evaluation            [] Daily Treatment Note         [] Discharge Summary      Patient: Robinson Pascual   : 1950   MRN: [id number]   Date of Service:  2024  Admitting Diagnosis: Frequent falls  Current Admission Summary: Robinson Pascual is a 73 y.o. male who has been feeling fatigued for the last couple days feels like he had the flu has a slight cough that is nonproductive some chills and weakness and muscle aches. Denies any significant diarrhea nausea vomiting. Patient had multiple falls over the last couple weeks yesterday tripped and fell and was not able to get patient lives alone by himself friend had not heard from him thus called EMS and patient was found on the ground and brought to the ED.   Past Medical History:  has a past medical history of Anxiety, BPH (benign prostatic hyperplasia), CAD (coronary artery disease), Chronic back pain, DDD (degenerative disc disease), lumbosacral, Depression, Eosinophilic granuloma (HCC), Epilepsy (HCC), Hyperlipidemia, Hypertension, Irritable bowel syndrome, Myofascial pain syndrome, Obstructive sleep apnea (adult) (pediatric), and Type II or unspecified type diabetes mellitus without mention of complication, not stated as uncontrolled.  Past Surgical History:  has a past surgical history that includes Lung biopsy; Coronary angioplasty with stent (10/2013); and Elbow bursa surgery (Left, 2014).  Discharge Recommendations: Robinson Pascual scored a 17/24 on the AM-PAC short mobility form. Current research shows that an AM-PAC score of 17 or less is typically not associated with a discharge to the patient's home setting. Based on the patient's AM-PAC score and their current functional mobility deficits, it is recommended that the patient have 3-5 sessions per week of Physical Therapy at d/c to increase the patient's independence.  Please see  (Complexity): medium complexity  Clinical Presentation: evolving      Subjective  General: Pt semi-fowlers in bed on arrival, agreeable to participate in PT initial evaluation.  Pain: Patient does not rate upon questioning- back pain  Pain Interventions: repositioned  and therapy activities modified     Functional Mobility  Bed Mobility:  Supine to Sit: minimal assistance  Scooting: stand by assistance- toward EOB  Comments: Supine to sit: HOB elevated, increased time required to complete task, use of bed rail, physical assistance required for trunk  Transfers:  Sit to stand transfer: contact guard assistance  Stand to sit transfer: contact guard assistance  Stand step transfer: minimal assistance  Comments: Use of RW for transfers, verbal cues required for hand placement  Ambulation:  Ambulation not tested on this date secondary to pt feeling unwell.  Distance: N/A  Gait Mechanics: N/A  Comments:    Stair Mobility:  Stair mobility not completed on this date.  Comments:  Wheelchair Mobility:  No w/c mobility completed on this date.  Comments:  Balance:  Static Sitting Balance: fair (+): maintains balance at SBA/supervision without use of UE support  Static Standing Balance: fair (-): maintains balance at CGA with use of UE support  Dynamic Standing Balance: poor (+): requires min (A) to maintain balance  Comments:    Other Therapeutic Interventions    Functional Outcomes  AM-PAC Inpatient Mobility Raw Score : 17              Cognition  Overall Cognitive Status: Impaired  Arousal/Alertness: appropriate responses to stimuli  Following Commands: follows one step commands consistently  Attention Span: appears intact  Memory: decreased short term memory  Safety Judgement: decreased awareness of need for assistance, decreased awareness of need for safety  Problem Solving: assistance required to identify errors made, assistance required to correct errors made  Insights: decreased awareness of deficits  Initiation: requires

## 2024-08-18 NOTE — PROGRESS NOTES
[facility]    8/18/2024 1:56 PM        Name: Robinson Pascual .              Admitted: 8/17/2024  Primary Care Provider: Rashad Her MD (Tel: [phone])        Subjective:    Patient lying in bed having fevers overnight up to 103 denies any nausea vomiting chest pain burning with urination cough abdominal pain or diarrhea    Reviewed interval ancillary notes    Current Medications  albuterol sulfate HFA (PROVENTIL;VENTOLIN;PROAIR) 108 (90 Base) MCG/ACT inhaler 2 puff, Q6H PRN  aluminum & magnesium hydroxide-simethicone (MAALOX) 200-200-20 MG/5ML suspension 5 mL, Q6H PRN  aspirin EC tablet 81 mg, Daily  atorvastatin (LIPITOR) tablet 40 mg, Nightly  lisinopril (PRINIVIL;ZESTRIL) tablet 20 mg, Daily  insulin lispro (HUMALOG,ADMELOG) injection vial 0-8 Units, TID WC  insulin lispro (HUMALOG,ADMELOG) injection vial 0-4 Units, Nightly  pantoprazole (PROTONIX) tablet 40 mg, QAM AC  tamsulosin (FLOMAX) capsule 0.4 mg, QPM  sodium chloride flush 0.9 % injection 5-40 mL, 2 times per day  sodium chloride flush 0.9 % injection 5-40 mL, PRN  0.9 % sodium chloride infusion, PRN  enoxaparin (LOVENOX) injection 40 mg, Daily  polyethylene glycol (GLYCOLAX) packet 17 g, Daily PRN  acetaminophen (TYLENOL) tablet 650 mg, Q6H PRN   Or  acetaminophen (TYLENOL) suppository 650 mg, Q6H PRN  nicotine (NICODERM CQ) 14 MG/24HR 1 patch, Daily  lactated ringers IV soln infusion, Continuous  dextrose bolus 10% 125 mL, PRN   Or  dextrose bolus 10% 250 mL, PRN  dextrose 10 % infusion, Continuous PRN        Objective:  /69   Pulse 78   Temp (!) 102.8 °F (39.3 °C)   Resp 19   Ht 1.753 m (5' 9\")   Wt 89.6 kg (197 lb 8 oz)   SpO2 98%   BMI 29.17 kg/m²   No intake or output data in the 24 hours ending 08/18/24 1356   Wt Readings from Last 3 Encounters:   08/17/24 89.6 kg (197 lb 8 oz)   02/26/24 89.6 kg (197 lb 9.6 oz)   01/10/24 88.5 kg  infiltrate.             Recent imaging reviewed    Problem List  Principal Problem:    Frequent falls  Resolved Problems:    * No resolved hospital problems. *       Assessment/Plan:   Frequent falls  Orthostatisc and pt ot pending will fu    Fevers check procal bc and resp panel     Tobacco abuse: Greater than 10 minutes spent during course admission tobacco cessation       Dm2 ssi  DVT prophylaxis lovenox  Code status full code      Marcell Hirsch MD   8/18/2024 1:56 PM

## 2024-08-18 NOTE — PROGRESS NOTES
Patient resting in bed, [place] Fall Risk: High (45 and higher), temp down to 99.3, respiratory panel negative, patient states he's starting to feels better, assisted to position of comfort, call light and belongings in reach, encouraged to call with needs,   Vitals:    08/18/24 1950   BP: 115/71   Pulse: 95   Resp: 18   Temp: 99.3 °F (37.4 °C)   SpO2: 91%

## 2024-08-18 NOTE — RT PROTOCOL NOTE
RT Inhaler-Nebulizer Bronchodilator Protocol Note    There is a bronchodilator order in the chart from a provider indicating to follow the RT Bronchodilator Protocol and there is an “Initiate RT Inhaler-Nebulizer Bronchodilator Protocol” order as well (see protocol at bottom of note).    CXR Findings:  XR CHEST PORTABLE    Result Date: 8/17/2024  Stable emphysema and chronic lung changes. No superimposed acute infiltrate.       The findings from the last RT Protocol Assessment were as follows:   History Pulmonary Disease: Smoker 15 pack years or more  Respiratory Pattern: Regular pattern and RR 12-20 bpm  Breath Sounds: Slightly diminished and/or crackles  Cough: Strong, spontaneous, non-productive  Indication for Bronchodilator Therapy: On home bronchodilators  Bronchodilator Assessment Score: 3    Aerosolized bronchodilator medication orders have been revised according to the RT Inhaler-Nebulizer Bronchodilator Protocol below.    Respiratory Therapist to perform RT Therapy Protocol Assessment initially then follow the protocol.  Repeat RT Therapy Protocol Assessment PRN for score 0-3 or on second treatment, BID, and PRN for scores above 3.    No Indications - adjust the frequency to every 6 hours PRN wheezing or bronchospasm, if no treatments needed after 48 hours then discontinue using Per Protocol order mode.     If indication present, adjust the RT bronchodilator orders based on the Bronchodilator Assessment Score as indicated below.  Use Inhaler orders unless patient has one or more of the following: on home nebulizer, not able to hold breath for 10 seconds, is not alert and oriented, cannot activate and use MDI correctly, or respiratory rate 25 breaths per minute or more, then use the equivalent nebulizer order(s) with same Frequency and PRN reasons based on the score.  If a patient is on this medication at home then do not decrease Frequency below that used at home.    0-3 - enter or revise RT bronchodilator

## 2024-08-19 ENCOUNTER — APPOINTMENT (OUTPATIENT)
Dept: GENERAL RADIOLOGY | Age: 74
DRG: 194 | End: 2024-08-19
Payer: MEDICARE

## 2024-08-19 ENCOUNTER — APPOINTMENT (OUTPATIENT)
Dept: CT IMAGING | Age: 74
DRG: 194 | End: 2024-08-19
Payer: MEDICARE

## 2024-08-19 PROBLEM — E83.42 HYPOMAGNESEMIA: Status: ACTIVE | Noted: 2024-08-19

## 2024-08-19 PROBLEM — S80.01XA CONTUSION OF RIGHT KNEE: Status: ACTIVE | Noted: 2024-08-19

## 2024-08-19 PROBLEM — E66.3 OVERWEIGHT (BMI 25.0-29.9): Status: ACTIVE | Noted: 2024-08-19

## 2024-08-19 PROBLEM — R74.8 ELEVATED CK: Status: ACTIVE | Noted: 2024-08-19

## 2024-08-19 PROBLEM — F17.200 SMOKER: Status: ACTIVE | Noted: 2024-08-19
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
## 2024-08-19 PROBLEM — R26.2 UNABLE TO AMBULATE: Status: ACTIVE | Noted: 2024-08-19

## 2024-08-19 PROBLEM — E87.1 HYPONATREMIA: Status: ACTIVE | Noted: 2024-08-19

## 2024-08-19 PROBLEM — J18.9 PNEUMONIA OF BOTH LOWER LOBES DUE TO INFECTIOUS ORGANISM: Status: ACTIVE | Noted: 2024-08-19

## 2024-08-19 PROBLEM — A41.9 SEPSIS (HCC): Status: ACTIVE | Noted: 2024-08-19

## 2024-08-19 PROBLEM — R53.1 GENERALIZED WEAKNESS: Status: ACTIVE | Noted: 2024-08-19

## 2024-08-19 PROBLEM — S29.019A THORACIC MYOFASCIAL STRAIN: Status: ACTIVE | Noted: 2024-08-19

## 2024-08-19 PROBLEM — S80.02XA CONTUSION OF LEFT KNEE: Status: ACTIVE | Noted: 2024-08-19

## 2024-08-19 PROBLEM — M54.50 LOW BACK PAIN: Status: ACTIVE | Noted: 2024-08-19

## 2024-08-19 PROBLEM — M25.552 ACUTE HIP PAIN, LEFT: Status: ACTIVE | Noted: 2024-08-19

## 2024-08-19 PROBLEM — R79.89 ELEVATED PROCALCITONIN: Status: ACTIVE | Noted: 2024-08-19

## 2024-08-19 LAB
AMPHETAMINES UR QL SCN>1000 NG/ML: NORMAL
ANION GAP SERPL CALCULATED.3IONS-SCNC: 14 MMOL/L (ref 3–16)
BACTERIA URNS QL MICRO: NORMAL /HPF
BARBITURATES UR QL SCN>200 NG/ML: NORMAL
BASOPHILS # BLD: 0 K/UL (ref 0–0.2)
BASOPHILS NFR BLD: 0 %
BENZODIAZ UR QL SCN>200 NG/ML: NORMAL
BILIRUB UR QL STRIP.AUTO: NEGATIVE
BUN SERPL-MCNC: 30 MG/DL (ref 7–20)
CALCIUM SERPL-MCNC: 9 MG/DL (ref 8.3–10.6)
CANNABINOIDS UR QL SCN>50 NG/ML: NORMAL
CHLORIDE SERPL-SCNC: 98 MMOL/L (ref 99–110)
CLARITY UR: CLEAR
CO2 SERPL-SCNC: 20 MMOL/L (ref 21–32)
COCAINE UR QL SCN: NORMAL
COLOR UR: YELLOW
CREAT SERPL-MCNC: 1.3 MG/DL (ref 0.8–1.3)
CRP SERPL-MCNC: 161.6 MG/L (ref 0–5.1)
DEPRECATED RDW RBC AUTO: 13.6 % (ref 12.4–15.4)
DRUG SCREEN COMMENT UR-IMP: NORMAL
EOSINOPHIL # BLD: 0 K/UL (ref 0–0.6)
EOSINOPHIL NFR BLD: 0 %
EPI CELLS #/AREA URNS AUTO: 1 /HPF (ref 0–5)
ERYTHROCYTE [SEDIMENTATION RATE] IN BLOOD BY WESTERGREN METHOD: 65 MM/HR (ref 0–20)
FENTANYL SCREEN, URINE: NORMAL
GFR SERPLBLD CREATININE-BSD FMLA CKD-EPI: 58 ML/MIN/{1.73_M2}
GLUCOSE BLD-MCNC: 158 MG/DL (ref 70–99)
GLUCOSE BLD-MCNC: 175 MG/DL (ref 70–99)
GLUCOSE BLD-MCNC: 220 MG/DL (ref 70–99)
GLUCOSE BLD-MCNC: 237 MG/DL (ref 70–99)
GLUCOSE SERPL-MCNC: 179 MG/DL (ref 70–99)
GLUCOSE UR STRIP.AUTO-MCNC: 250 MG/DL
HCT VFR BLD AUTO: 40.1 % (ref 40.5–52.5)
HGB BLD-MCNC: 13.5 G/DL (ref 13.5–17.5)
HGB UR QL STRIP.AUTO: ABNORMAL
HYALINE CASTS #/AREA URNS AUTO: 0 /LPF (ref 0–8)
KETONES UR STRIP.AUTO-MCNC: NEGATIVE MG/DL
LEUKOCYTE ESTERASE UR QL STRIP.AUTO: NEGATIVE
LYMPHOCYTES # BLD: 1.1 K/UL (ref 1–5.1)
LYMPHOCYTES NFR BLD: 12 %
MCH RBC QN AUTO: 29 PG (ref 26–34)
MCHC RBC AUTO-ENTMCNC: 33.6 G/DL (ref 31–36)
MCV RBC AUTO: 86.1 FL (ref 80–100)
METHADONE UR QL SCN>300 NG/ML: NORMAL
MONOCYTES # BLD: 1.6 K/UL (ref 0–1.3)
MONOCYTES NFR BLD: 18 %
NEUTROPHILS # BLD: 6.2 K/UL (ref 1.7–7.7)
NEUTROPHILS NFR BLD: 65 %
NEUTS BAND NFR BLD MANUAL: 5 % (ref 0–7)
NITRITE UR QL STRIP.AUTO: NEGATIVE
OPIATES UR QL SCN>300 NG/ML: NORMAL
OXYCODONE UR QL SCN: NORMAL
PATH INTERP BLD-IMP: NORMAL
PATH INTERP BLD-IMP: YES
PCP UR QL SCN>25 NG/ML: NORMAL
PERFORMED ON: ABNORMAL
PH UR STRIP.AUTO: 5.5 [PH] (ref 5–8)
PH UR STRIP: 6 [PH]
PLATELET # BLD AUTO: 143 K/UL (ref 135–450)
PLATELET BLD QL SMEAR: ADEQUATE
PMV BLD AUTO: 9.4 FL (ref 5–10.5)
POTASSIUM SERPL-SCNC: 4.3 MMOL/L (ref 3.5–5.1)
PROT UR STRIP.AUTO-MCNC: 100 MG/DL
RBC # BLD AUTO: 4.66 M/UL (ref 4.2–5.9)
RBC CLUMPS #/AREA URNS AUTO: 1 /HPF (ref 0–4)
RBC MORPH BLD: NORMAL
SLIDE REVIEW: ABNORMAL
SMUDGE CELLS BLD QL SMEAR: PRESENT
SODIUM SERPL-SCNC: 132 MMOL/L (ref 136–145)
SP GR UR STRIP.AUTO: 1.02 (ref 1–1.03)
UA COMPLETE W REFLEX CULTURE PNL UR: ABNORMAL
UA DIPSTICK W REFLEX MICRO PNL UR: YES
URN SPEC COLLECT METH UR: ABNORMAL
UROBILINOGEN UR STRIP-ACNC: 2 E.U./DL
WBC # BLD AUTO: 8.8 K/UL (ref 4–11)
WBC #/AREA URNS AUTO: 1 /HPF (ref 0–5)

## 2024-08-19 PROCEDURE — 2580000003 HC RX 258: Performed by: INTERNAL MEDICINE

## 2024-08-19 PROCEDURE — 87449 NOS EACH ORGANISM AG IA: CPT

## 2024-08-19 PROCEDURE — 71046 X-RAY EXAM CHEST 2 VIEWS: CPT

## 2024-08-19 PROCEDURE — 1200000000 HC SEMI PRIVATE

## 2024-08-19 PROCEDURE — 86701 HIV-1ANTIBODY: CPT

## 2024-08-19 PROCEDURE — 87390 HIV-1 AG IA: CPT

## 2024-08-19 PROCEDURE — 2700000000 HC OXYGEN THERAPY PER DAY

## 2024-08-19 PROCEDURE — 97530 THERAPEUTIC ACTIVITIES: CPT

## 2024-08-19 PROCEDURE — 80307 DRUG TEST PRSMV CHEM ANLYZR: CPT

## 2024-08-19 PROCEDURE — 86702 HIV-2 ANTIBODY: CPT

## 2024-08-19 PROCEDURE — 85652 RBC SED RATE AUTOMATED: CPT

## 2024-08-19 PROCEDURE — 6360000002 HC RX W HCPCS: Performed by: INTERNAL MEDICINE

## 2024-08-19 PROCEDURE — 80048 BASIC METABOLIC PNL TOTAL CA: CPT

## 2024-08-19 PROCEDURE — 97165 OT EVAL LOW COMPLEX 30 MIN: CPT

## 2024-08-19 PROCEDURE — 86140 C-REACTIVE PROTEIN: CPT

## 2024-08-19 PROCEDURE — 87641 MR-STAPH DNA AMP PROBE: CPT

## 2024-08-19 PROCEDURE — 81001 URINALYSIS AUTO W/SCOPE: CPT

## 2024-08-19 PROCEDURE — 99223 1ST HOSP IP/OBS HIGH 75: CPT | Performed by: INTERNAL MEDICINE

## 2024-08-19 PROCEDURE — 71250 CT THORAX DX C-: CPT

## 2024-08-19 PROCEDURE — 74176 CT ABD & PELVIS W/O CONTRAST: CPT

## 2024-08-19 PROCEDURE — 6370000000 HC RX 637 (ALT 250 FOR IP): Performed by: INTERNAL MEDICINE

## 2024-08-19 PROCEDURE — 85025 COMPLETE CBC W/AUTO DIFF WBC: CPT

## 2024-08-19 PROCEDURE — 36415 COLL VENOUS BLD VENIPUNCTURE: CPT

## 2024-08-19 PROCEDURE — 94760 N-INVAS EAR/PLS OXIMETRY 1: CPT

## 2024-08-19 RX ORDER — LEVOFLOXACIN 5 MG/ML
750 INJECTION, SOLUTION INTRAVENOUS EVERY 24 HOURS
Status: DISCONTINUED | OUTPATIENT
Start: 2024-08-19 | End: 2024-08-21 | Stop reason: HOSPADM

## 2024-08-19 RX ADMIN — PANTOPRAZOLE SODIUM 40 MG: 40 TABLET, DELAYED RELEASE ORAL at 05:09

## 2024-08-19 RX ADMIN — ASPIRIN 81 MG: 81 TABLET, COATED ORAL at 10:03

## 2024-08-19 RX ADMIN — INSULIN LISPRO 2 UNITS: 100 INJECTION, SOLUTION INTRAVENOUS; SUBCUTANEOUS at 16:23

## 2024-08-19 RX ADMIN — SODIUM CHLORIDE 1500 MG: 9 INJECTION, SOLUTION INTRAVENOUS at 16:22

## 2024-08-19 RX ADMIN — ENOXAPARIN SODIUM 40 MG: 100 INJECTION SUBCUTANEOUS at 10:03

## 2024-08-19 RX ADMIN — INSULIN LISPRO 2 UNITS: 100 INJECTION, SOLUTION INTRAVENOUS; SUBCUTANEOUS at 12:52

## 2024-08-19 RX ADMIN — ACETAMINOPHEN 650 MG: 325 TABLET ORAL at 00:32

## 2024-08-19 RX ADMIN — LEVOFLOXACIN 750 MG: 5 INJECTION, SOLUTION INTRAVENOUS at 12:57

## 2024-08-19 RX ADMIN — TAMSULOSIN HYDROCHLORIDE 0.4 MG: 0.4 CAPSULE ORAL at 20:40

## 2024-08-19 RX ADMIN — ACETAMINOPHEN 650 MG: 325 TABLET ORAL at 20:40

## 2024-08-19 RX ADMIN — SODIUM CHLORIDE, PRESERVATIVE FREE 10 ML: 5 INJECTION INTRAVENOUS at 20:43

## 2024-08-19 RX ADMIN — LISINOPRIL 20 MG: 20 TABLET ORAL at 10:03

## 2024-08-19 RX ADMIN — ATORVASTATIN CALCIUM 40 MG: 40 TABLET, FILM COATED ORAL at 20:40

## 2024-08-19 ASSESSMENT — ENCOUNTER SYMPTOMS
WHEEZING: 0
DIARRHEA: 0
EYE DISCHARGE: 0
COUGH: 0
EYE REDNESS: 0
SINUS PAIN: 0
CONSTIPATION: 0
ABDOMINAL PAIN: 0
SINUS PRESSURE: 0
RHINORRHEA: 0
NAUSEA: 0
BACK PAIN: 0
SHORTNESS OF BREATH: 1
SORE THROAT: 0

## 2024-08-19 NOTE — PROGRESS NOTES
Patient's temp 103 and 02 88% on room air, patient placed on 2L nasal cannula, tylenol given, and NP notified of continued fevers and new oxygen requirement, also notified NP that yesterday's lactic and procalcitonin were elevated and that patient has not received antibiotics this admission, no new orders

## 2024-08-19 NOTE — PROGRESS NOTES
[facility]    8/19/2024 11:52 AM        Name: Robinson Pascual .              Admitted: 8/17/2024  Primary Care Provider: Rashad Her MD (Tel: [phone])        Subjective:    Spiking fevers up to 103 requiring oxygen overnight patient having a cough nonproductive denies any chest pain nausea vomiting  Reviewed interval ancillary notes    Current Medications  levoFLOXacin (LEVAQUIN) 750 MG/150ML infusion 750 mg, Q24H  albuterol sulfate HFA (PROVENTIL;VENTOLIN;PROAIR) 108 (90 Base) MCG/ACT inhaler 2 puff, Q6H PRN  aluminum & magnesium hydroxide-simethicone (MAALOX) 200-200-20 MG/5ML suspension 5 mL, Q6H PRN  aspirin EC tablet 81 mg, Daily  atorvastatin (LIPITOR) tablet 40 mg, Nightly  lisinopril (PRINIVIL;ZESTRIL) tablet 20 mg, Daily  insulin lispro (HUMALOG,ADMELOG) injection vial 0-8 Units, TID WC  insulin lispro (HUMALOG,ADMELOG) injection vial 0-4 Units, Nightly  pantoprazole (PROTONIX) tablet 40 mg, QAM AC  tamsulosin (FLOMAX) capsule 0.4 mg, QPM  sodium chloride flush 0.9 % injection 5-40 mL, 2 times per day  sodium chloride flush 0.9 % injection 5-40 mL, PRN  0.9 % sodium chloride infusion, PRN  enoxaparin (LOVENOX) injection 40 mg, Daily  polyethylene glycol (GLYCOLAX) packet 17 g, Daily PRN  acetaminophen (TYLENOL) tablet 650 mg, Q6H PRN   Or  acetaminophen (TYLENOL) suppository 650 mg, Q6H PRN  nicotine (NICODERM CQ) 14 MG/24HR 1 patch, Daily  lactated ringers IV soln infusion, Continuous  dextrose bolus 10% 125 mL, PRN   Or  dextrose bolus 10% 250 mL, PRN  dextrose 10 % infusion, Continuous PRN        Objective:  BP (!) 165/68   Pulse 90   Temp (!) 100.7 °F (38.2 °C) (Oral)   Resp 16   Ht 1.753 m (5' 9\")   Wt 89.6 kg (197 lb 8 oz)   SpO2 94%   BMI 29.17 kg/m²     Intake/Output Summary (Last 24 hours) at 8/19/2024 1152  Last data filed at 8/19/2024 1047  Gross per 24 hour   Intake 240 ml   Output  1100 ml   Net -860 ml      Wt Readings from Last 3 Encounters:   08/17/24 89.6 kg (197 lb 8 oz)   02/26/24 89.6 kg (197 lb 9.6 oz)   01/10/24 88.5 kg (195 lb)       General appearance:  Appears comfortable. AAOx3  HEENT: atraumatic, Pupils equal, muscous membranes moist, no masses appreciated  Cardiovascular: Regular rate and rhythm no murmurs appreciated  Respiratory: CTAB no wheezing  Gastrointestinal: Abdomen soft, non-tender, BS+  EXT: no edema  Neurology: no gross focal deficts  Psychiatry: Appropriate affect. Not agitated  Skin: Warm, dry, no rashes appreciated    Labs and Tests:  CBC:   Recent Labs     08/17/24  1239 08/18/24  0515 08/19/24  0504   WBC 8.6 7.9 8.8   HGB 14.5 13.6 13.5    150 143     BMP:    Recent Labs     08/17/24  1239 08/18/24  0515 08/19/24  0504   * 134* 132*   K 4.1 4.2 4.3   CL 95* 100 98*   CO2 23 20* 20*   BUN 29* 32* 30*   CREATININE 1.3 1.5* 1.3   GLUCOSE 186* 174* 179*     Hepatic: No results for input(s): \"AST\", \"ALT\", \"BILITOT\", \"ALKPHOS\" in the last 72 hours.    Invalid input(s): \"ALB\"  XR CHEST (2 VW)   Final Result   Diffuse bilateral opacity could reflect pulmonary edema or in the appropriate   clinical setting, atypical infection.         CTA HEAD NECK W CONTRAST   Final Result   Moderate stenosis in the A4 segment of the right VIVI.      50% stenosis in the cavernous/supraclinoid segments of the right internal   carotid artery.      No acute abnormality or flow-limiting stenosis of the major arteries of the   neck.      Ground-glass attenuation and septal thickening at the lung apices, may be   related to mild pulmonary vascular congestion versus infection/inflammation,   stable.      Left maxillary sinusitis.         CT HEAD WO CONTRAST   Final Result   No acute intracranial abnormality.         XR THORACIC SPINE (2 VIEWS)   Final Result   Multilevel degenerative disc disease      No acute thoracic spine abnormality         XR KNEE LEFT (3 VIEWS)   Final

## 2024-08-19 NOTE — PROGRESS NOTES
Page placed to Dr Hirsch to report consistent fevers overnight as high as 103f states he will review and place orders.

## 2024-08-19 NOTE — PROGRESS NOTES
[facility] - Inpatient Rehabilitation Department   Phone: [phone]    Physical Therapy    [] Initial Evaluation            [x] Daily Treatment Note         [] Discharge Summary      Patient: Robinson Pascual   : 1950   MRN: [id number]   Date of Service:  2024  Admitting Diagnosis: Frequent falls  Current Admission Summary: Robinson Pascual is a 73 y.o. male who has been feeling fatigued for the last couple days feels like he had the flu has a slight cough that is nonproductive some chills and weakness and muscle aches. Denies any significant diarrhea nausea vomiting. Patient had multiple falls over the last couple weeks yesterday tripped and fell and was not able to get patient lives alone by himself friend had not heard from him thus called EMS and patient was found on the ground and brought to the ED.   Past Medical History:  has a past medical history of Anxiety, BPH (benign prostatic hyperplasia), CAD (coronary artery disease), Chronic back pain, DDD (degenerative disc disease), lumbosacral, Depression, Eosinophilic granuloma (HCC), Epilepsy (HCC), Hyperlipidemia, Hypertension, Irritable bowel syndrome, Myofascial pain syndrome, Obstructive sleep apnea (adult) (pediatric), and Type II or unspecified type diabetes mellitus without mention of complication, not stated as uncontrolled.  Past Surgical History:  has a past surgical history that includes Lung biopsy; Coronary angioplasty with stent (10/2013); and Elbow bursa surgery (Left, 2014).    Discharge Recommendations: Robinson Pascual scored a  on the AM-PAC short mobility form. Current research shows that an AM-PAC score of 17 or less is typically not associated with a discharge to the patient's home setting. Based on the patient's AM-PAC score and their current functional mobility deficits, it is recommended that the patient have 3-5 sessions per week of Physical Therapy at d/c to increase the patient's independence.  Please see  Interventions    Functional Outcomes  AM-PAC Inpatient Mobility Raw Score : 12              Cognition  Overall Cognitive Status: Impaired  Arousal/Alertness: inconsistent responses to stimuli  Following Commands: inconsistently follows commands  Attention Span: difficulty attending to directions  Safety Judgement: decreased awareness of need for assistance, decreased awareness of need for safety  Problem Solving: assistance required to generate solutions, assistance required to implement solutions, decreased awareness of errors, assistance required to identify errors made, assistance required to correct errors made  Insights: decreased awareness of deficits  Initiation: requires cues for some  Sequencing: requires cues for some  Pt requiring extended time for processing commands throughout session.   Orientation:    oriented to person and disoriented to situation  Command Following:   impaired    Education  Barriers To Learning: cognition  Patient Education: patient educated on goals, PT role and benefits, plan of care, general safety, functional mobility training, proper use of assistive device/equipment, transfer training  Learning Assessment:  patient will require reinforcement due to cognitive deficits    Assessment  Activity Tolerance: Poor; no c/o pain however pt states SOB while seated in recliner upon arrival and slight dizziness upon standing from chair. O2 sat ranging 94-95% with mobility throughout session on RA.   Impairments Requiring Therapeutic Intervention: decreased functional mobility, decreased strength, decreased safety awareness, decreased cognition, decreased endurance, decreased balance  Prognosis: fair  Clinical Assessment: Pt presents today significantly below baseline for functional mobility limiting his ability to safely and independently return to [facility] at this time. Pt normally independent at home with no assistance however requiring Mod-Max A for all mobility this date and demonstrates

## 2024-08-19 NOTE — CONSULTS
Infectious Diseases   Consult Note        Admission Date: 8/17/2024  Hospital Day: Hospital Day: 3   Attending: Marcell Hirsch MD  Date of service: 8/19/24     Reason for admission: Hypomagnesemia [E83.42]  Hyponatremia [E87.1]  Generalized weakness [R53.1]  Elevated CK [R74.8]  Unable to ambulate [R26.2]  Frequent falls [R29.6]  Contusion of right knee, initial encounter [S80.01XA]  Contusion of left knee, initial encounter [S80.02XA]  Fall, initial encounter [W19.XXXA]  Acute hip pain, left [M25.552]  Thoracic myofascial strain, initial encounter [S29.019A]  Fever and chills [R50.9]    Chief complaint/ Reason for consult: Sepsis, generalized weakness and falls    Microbiology:        I have reviewed allavailable micro lab data and cultures    Blood culture (2/2) - collected on 8/17/2024: In process      Antibiotics and immunizations:       Current antibiotics: All antibiotics and their doses were reviewed by me    Recent Abx Admin                     levoFLOXacin (LEVAQUIN) 750 MG/150ML infusion 750 mg (mg) 750 mg New Bag 08/19/24 1257                      Immunization History: All immunization history was reviewed by me today.    Immunization History   Administered Date(s) Administered    TDaP, ADACEL (age 10y-64y), BOOSTRIX (age 10y+), IM, 0.5mL 10/08/2018       Known drug allergies:     All allergies were reviewed and updated    Allergies   Allergen Reactions    Penicillins Hives    Benadryl [Diphenhydramine Hcl] Rash    Erythromycin Rash    Iodine Rash       Social history:     Social History:  All social andepidemiologic history was reviewed and updated by me today as needed.     Tobacco use:   reports that he has been smoking cigarettes. He started smoking about 55 years ago. He has a 61.2 pack-year smoking history. He has never used smokeless tobacco.  Alcohol use:   reports no history of alcohol use.  Currently lives in: [place] [id number]   reports no history of drug use.     COVID VACCINATION  M51.37    DM (diabetes mellitus), secondary, with complications ([facility]) E13.8    Neck pain M54.2    BPH (benign prostatic hyperplasia) N40.0    Acute MI, lateral wall, initial episode of care ([facility]) I21.29    Coronary artery disease due to lipid rich plaque I25.10, I25.83    Myofascial pain syndrome M79.18    Olecranon bursitis M70.20    Hematoma T14.8XXA    Elbow pain M25.529    Status post insertion of drug-eluting stent into right coronary artery for coronary artery disease Z95.5    IFEANYI (obstructive sleep apnea) G47.33    Depression F32.A    Eosinophilic granuloma ([facility]) C96.6    Localized edema R60.0    Status post insertion of drug eluting coronary artery stent Z95.5    Numbness of arm R20.0    TIA involving left internal carotid artery G45.1    Poorly controlled type 2 diabetes mellitus ([facility]) E11.65    Dyslipidemia E78.5    Left sided numbness R20.0    Trigger finger of left thumb M65.312    Epilepsy ([facility]) G40.909    Other malaise  R53.81    Obesity (BMI 30.0-34.9) E66.9    Insomnia due to medical condition G47.01    Acute cerebrovascular accident (CVA) ([facility]) I63.9    Dizziness R42    Tremor R25.1    Frequent falls R29.6    Fever and chills R50.9    Unable to ambulate R26.2    Overweight (BMI 25.0-29.9) E66.3    Smoker F17.200    Elevated procalcitonin R79.89    Low back pain M54.50    Pneumonia of both lower lobes due to infectious organism J18.9    Sepsis ([facility]) A41.9    Acute hip pain, left M25.552    Contusion of left knee S80.02XA    Contusion of right knee S80.01XA    Elevated CK R74.8    Fall W19.XXXA    Generalized weakness R53.1    Hypomagnesemia E83.42    Hyponatremia E87.1    Thoracic myofascial strain S29.019A         Please note that this chart was generated using Dragon dictation software. Although every effort was made to ensure the accuracy of this automated transcription, some errors in transcription may have occurred inadvertently. If you may need any clarification, please do not hesitate to

## 2024-08-19 NOTE — PROGRESS NOTES
Clinical Pharmacy Note: Pharmacy to Dose Vancomycin    Robinson Pascual is a 73 y.o. male started on Vancomycin for Sepsis; consult received from Dr. Villanueva to manage therapy. Also receiving the following antibiotics: Levaquin.    Goal AUC: 400-600 mg/L*hr  Goal Trough Level: 15-20 mcg/mL    Assessment/Plan:  Initiate vancomycin 1500 mg IV every 24 hours. Bayesian modeling predicts an AUC of 492 mg/L*hr and a trough of 12.8 mcg/mL at steady state concentration.  A vancomycin random level has been ordered for 8/20 at 0600  Changes in regimen will be determined based on culture results, renal function, and clinical response.  Pharmacy will continue to monitor and adjust regimen as necessary.    Allergies:  Penicillins, Benadryl [diphenhydramine hcl], Erythromycin, and Iodine     Recent Labs     08/18/24  0515 08/19/24  0504   CREATININE 1.5* 1.3       Recent Labs     08/18/24  0515 08/19/24  0504   WBC 7.9 8.8       Ht Readings from Last 1 Encounters:   08/17/24 1.753 m (5' 9\")        Wt Readings from Last 1 Encounters:   08/17/24 89.6 kg (197 lb 8 oz)         Estimated Creatinine Clearance: 57 mL/min (based on SCr of 1.3 mg/dL).      Thank you for the consult,    Riccardo Saenz [facility]  [id number]

## 2024-08-19 NOTE — CARE COORDINATION
Case Management Assessment  Initial Evaluation    Date/Time of Evaluation: 8/19/2024 3:08 PM  Assessment Completed by: Shayne Anderson Jr, RN    If patient is discharged prior to next notation, then this note serves as note for discharge by case management.    Patient Name: Robinson Pascual                   YOB: 1950  Diagnosis: Hypomagnesemia [E83.42]  Hyponatremia [E87.1]  Generalized weakness [R53.1]  Elevated CK [R74.8]  Unable to ambulate [R26.2]  Frequent falls [R29.6]  Contusion of right knee, initial encounter [S80.01XA]  Contusion of left knee, initial encounter [S80.02XA]  Fall, initial encounter [W19.XXXA]  Acute hip pain, left [M25.552]  Thoracic myofascial strain, initial encounter [S29.019A]  Fever and chills [R50.9]                   Date / Time: 8/17/2024 11:06 AM    Patient Admission Status: Inpatient   Readmission Risk (Low < 19, Mod (19-27), High > 27): Readmission Risk Score: 13.8    Current PCP: Rashad Her MD  PCP verified by CM? (P) Yes    Chart Reviewed: Yes      History Provided by: Patient  Patient Orientation: Alert and Oriented    Patient Cognition: Alert    Hospitalization in the last 30 days (Readmission):  No    If yes, Readmission Assessment in CM Navigator will be completed.    Advance Directives:      Code Status: Full Code   Patient's Primary Decision Maker is: (P) Legal Next of Kin    Primary Decision Maker: Shyanne Lopez - Brother/Sister - [phone]    Secondary Decision Maker: Marcelle Mathew - Niece/Nephew - [phone]    Secondary Decision Maker: Rashid Anton - Other - [phone]    Discharge Planning:    Patient lives with: (P) Alone Type of Home: (P) House  Primary Care Giver: Self  Patient Support Systems include: (P) Family Members   Current Financial resources: (P) Medicare  Current community resources: (P) None  Current services prior to admission: (P) C-pap            Current DME:              Type of Home Care services:  (P) None    ADLS  Prior

## 2024-08-19 NOTE — CARE COORDINATION
IMM Letter       08/19/24 1523   IMM Letter   IMM Letter given to Patient/Family/Significant other/Guardian/POA/by: Patient   IMM Letter date given: 08/19/24   IMM Letter time given: 1515     JACK Tinoco RN    [facility]  Phone: [phone]

## 2024-08-19 NOTE — PROGRESS NOTES
Pt resting awake in bed. PO meds taken easily. Denies pain. Reports he has had a cough overnight. Call light in reach. Pt is axox4 and able to answer questions and follow commands throughout assessment. Has periods of confusion. Reorients well.

## 2024-08-19 NOTE — PROGRESS NOTES
[facility] - Inpatient Rehabilitation Department   Phone: [phone]    Occupational Therapy    [x] Initial Evaluation            [] Daily Treatment Note         [] Discharge Summary      Patient: Robinson Pascual   : 1950   MRN: [id number]   Date of Service:  2024    Admitting Diagnosis:  Frequent falls  Current Admission Summary: 73 y.o. male who has been feeling fatigued for the last couple days feels like he had the flu has a slight cough that is nonproductive some chills and weakness and muscle aches. Denies any significant diarrhea nausea vomiting. Patient had multiple falls over the last couple weeks yesterday tripped and fell and was not able to get patient lives alone by himself friend had not heard from him thus called EMS and patient was found on the ground and brought to the ED.   Past Medical History:  has a past medical history of Anxiety, BPH (benign prostatic hyperplasia), CAD (coronary artery disease), Chronic back pain, DDD (degenerative disc disease), lumbosacral, Depression, Eosinophilic granuloma (HCC), Epilepsy (HCC), Hyperlipidemia, Hypertension, Irritable bowel syndrome, Myofascial pain syndrome, Obstructive sleep apnea (adult) (pediatric), and Type II or unspecified type diabetes mellitus without mention of complication, not stated as uncontrolled.  Past Surgical History:  has a past surgical history that includes Lung biopsy; Coronary angioplasty with stent (10/2013); and Elbow bursa surgery (Left, 2014).    Discharge Recommendations: Robinson Pascual scored a 14/24 on the AM-PAC ADL Inpatient form. Current research shows that an AM-PAC score of 17 or less is typically not associated with a discharge to the patient's home setting. Based on the patient's AM-PAC score and their current ADL deficits, it is recommended that the patient have 3-5 sessions per week of Occupational Therapy at d/c to increase the patient's independence.  Please see assessment section for  evaluation/education    Goals    Short Term Goals:  Time Frame: by discharge  Patient will complete upper body ADL at stand by assistance   Patient will complete lower body ADL at minimal assistance   Patient will complete toileting at contact guard assistance   Patient will complete grooming at supervision   Patient will complete functional transfers at stand by assistance   Patient will complete functional mobility at stand by assistance     Above goals reviewed on 8/19/2024.  All goals are ongoing at this time unless indicated above.       Therapy Session Time     Individual Group Co-treatment   Time In    1140   Time Out    1248   Minutes    68        Timed Code Treatment Minutes:   53  Total Treatment Minutes:  68       Electronically Signed By: COLETTE Mejia MOT, OTR/L, CNS ([id number])

## 2024-08-20 ENCOUNTER — APPOINTMENT (OUTPATIENT)
Age: 74
DRG: 194 | End: 2024-08-20
Attending: INTERNAL MEDICINE
Payer: MEDICARE

## 2024-08-20 PROBLEM — Z71.89 ENCOUNTER FOR MEDICATION COUNSELING: Status: ACTIVE | Noted: 2024-08-20

## 2024-08-20 LAB
ANION GAP SERPL CALCULATED.3IONS-SCNC: 12 MMOL/L (ref 3–16)
BASOPHILS # BLD: 0 K/UL (ref 0–0.2)
BASOPHILS NFR BLD: 0.3 %
BUN SERPL-MCNC: 22 MG/DL (ref 7–20)
CALCIUM SERPL-MCNC: 9 MG/DL (ref 8.3–10.6)
CHLORIDE SERPL-SCNC: 101 MMOL/L (ref 99–110)
CO2 SERPL-SCNC: 22 MMOL/L (ref 21–32)
CREAT SERPL-MCNC: 1 MG/DL (ref 0.8–1.3)
DEPRECATED RDW RBC AUTO: 13.6 % (ref 12.4–15.4)
ECHO AO ROOT DIAM: 3 CM
ECHO AO ROOT INDEX: 1.46 CM/M2
ECHO AV AREA PEAK VELOCITY: 2.6 CM2
ECHO AV AREA VTI: 2.5 CM2
ECHO AV AREA/BSA PEAK VELOCITY: 1.3 CM2/M2
ECHO AV AREA/BSA VTI: 1.2 CM2/M2
ECHO AV MEAN GRADIENT: 5 MMHG
ECHO AV MEAN VELOCITY: 1 M/S
ECHO AV PEAK GRADIENT: 9 MMHG
ECHO AV PEAK VELOCITY: 1.5 M/S
ECHO AV VELOCITY RATIO: 0.8
ECHO AV VTI: 28.4 CM
ECHO BSA: 2.09 M2
ECHO EST RA PRESSURE: 3 MMHG
ECHO IVC PROX: 1.7 CM
ECHO LA AREA 2C: 20.1 CM2
ECHO LA AREA 4C: 16.3 CM2
ECHO LA MAJOR AXIS: 5.2 CM
ECHO LA MINOR AXIS: 6 CM
ECHO LA VOL BP: 50 ML (ref 18–58)
ECHO LA VOL MOD A2C: 54 ML (ref 18–58)
ECHO LA VOL MOD A4C: 40 ML (ref 18–58)
ECHO LA VOL/BSA BIPLANE: 24 ML/M2 (ref 16–34)
ECHO LA VOLUME INDEX MOD A2C: 26 ML/M2 (ref 16–34)
ECHO LA VOLUME INDEX MOD A4C: 20 ML/M2 (ref 16–34)
ECHO LV E' LATERAL VELOCITY: 13 CM/S
ECHO LV E' SEPTAL VELOCITY: 10 CM/S
ECHO LV EDV A2C: 55 ML
ECHO LV EDV A4C: 61 ML
ECHO LV EDV INDEX A4C: 30 ML/M2
ECHO LV EDV NDEX A2C: 27 ML/M2
ECHO LV EJECTION FRACTION A2C: 59 %
ECHO LV EJECTION FRACTION A4C: 60 %
ECHO LV EJECTION FRACTION BIPLANE: 59 % (ref 55–100)
ECHO LV ESV A2C: 23 ML
ECHO LV ESV A4C: 24 ML
ECHO LV ESV INDEX A2C: 11 ML/M2
ECHO LV ESV INDEX A4C: 12 ML/M2
ECHO LV FRACTIONAL SHORTENING: 34 % (ref 28–44)
ECHO LV INTERNAL DIMENSION DIASTOLE INDEX: 2.15 CM/M2
ECHO LV INTERNAL DIMENSION DIASTOLIC: 4.4 CM (ref 4.2–5.9)
ECHO LV INTERNAL DIMENSION SYSTOLIC INDEX: 1.41 CM/M2
ECHO LV INTERNAL DIMENSION SYSTOLIC: 2.9 CM
ECHO LV IVSD: 1.3 CM (ref 0.6–1)
ECHO LV MASS 2D: 215.1 G (ref 88–224)
ECHO LV MASS INDEX 2D: 104.9 G/M2 (ref 49–115)
ECHO LV POSTERIOR WALL DIASTOLIC: 1.3 CM (ref 0.6–1)
ECHO LV RELATIVE WALL THICKNESS RATIO: 0.59
ECHO LVOT AREA: 3.1 CM2
ECHO LVOT AV VTI INDEX: 0.79
ECHO LVOT DIAM: 2 CM
ECHO LVOT MEAN GRADIENT: 3 MMHG
ECHO LVOT PEAK GRADIENT: 6 MMHG
ECHO LVOT PEAK VELOCITY: 1.2 M/S
ECHO LVOT STROKE VOLUME INDEX: 34.5 ML/M2
ECHO LVOT SV: 70.7 ML
ECHO LVOT VTI: 22.5 CM
ECHO MV A VELOCITY: 0.58 M/S
ECHO MV E VELOCITY: 0.88 M/S
ECHO MV E/A RATIO: 1.52
ECHO MV E/E' LATERAL: 6.77
ECHO MV E/E' RATIO (AVERAGED): 7.78
ECHO MV E/E' SEPTAL: 8.8
ECHO PV MAX VELOCITY: 0.8 M/S
ECHO PV PEAK GRADIENT: 2 MMHG
ECHO RA AREA 4C: 14.3 CM2
ECHO RA END SYSTOLIC VOLUME APICAL 4 CHAMBER INDEX BSA: 15 ML/M2
ECHO RA VOLUME: 30 ML
ECHO RIGHT VENTRICULAR SYSTOLIC PRESSURE (RVSP): 53 MMHG
ECHO RV BASAL DIMENSION: 3.7 CM
ECHO RV FREE WALL PEAK S': 13 CM/S
ECHO RV LONGITUDINAL DIMENSION: 7.2 CM
ECHO RV MID DIMENSION: 2.7 CM
ECHO RV TAPSE: 2 CM (ref 1.7–?)
ECHO TV REGURGITANT MAX VELOCITY: 3.54 M/S
ECHO TV REGURGITANT PEAK GRADIENT: 50 MMHG
EOSINOPHIL # BLD: 0 K/UL (ref 0–0.6)
EOSINOPHIL NFR BLD: 0.4 %
GFR SERPLBLD CREATININE-BSD FMLA CKD-EPI: 79 ML/MIN/{1.73_M2}
GLUCOSE BLD-MCNC: 145 MG/DL (ref 70–99)
GLUCOSE BLD-MCNC: 214 MG/DL (ref 70–99)
GLUCOSE BLD-MCNC: 220 MG/DL (ref 70–99)
GLUCOSE BLD-MCNC: 242 MG/DL (ref 70–99)
GLUCOSE SERPL-MCNC: 166 MG/DL (ref 70–99)
HCT VFR BLD AUTO: 38.3 % (ref 40.5–52.5)
HGB BLD-MCNC: 13.1 G/DL (ref 13.5–17.5)
HIV 1+2 AB+HIV1 P24 AG SERPL QL IA: NORMAL
HIV 2 AB SERPL QL IA: NORMAL
HIV1 AB SERPL QL IA: NORMAL
HIV1 P24 AG SERPL QL IA: NORMAL
LEGIONELLA AG UR QL: NORMAL
LYMPHOCYTES # BLD: 1 K/UL (ref 1–5.1)
LYMPHOCYTES NFR BLD: 15.8 %
MCH RBC QN AUTO: 29.4 PG (ref 26–34)
MCHC RBC AUTO-ENTMCNC: 34.2 G/DL (ref 31–36)
MCV RBC AUTO: 86 FL (ref 80–100)
MONOCYTES # BLD: 1.2 K/UL (ref 0–1.3)
MONOCYTES NFR BLD: 17.6 %
MRSA DNA SPEC QL NAA+PROBE: NORMAL
NEUTROPHILS # BLD: 4.3 K/UL (ref 1.7–7.7)
NEUTROPHILS NFR BLD: 65.9 %
PERFORMED ON: ABNORMAL
PLATELET # BLD AUTO: 137 K/UL (ref 135–450)
PMV BLD AUTO: 9.6 FL (ref 5–10.5)
POTASSIUM SERPL-SCNC: 4.1 MMOL/L (ref 3.5–5.1)
RBC # BLD AUTO: 4.46 M/UL (ref 4.2–5.9)
S PNEUM AG UR QL: NORMAL
SODIUM SERPL-SCNC: 135 MMOL/L (ref 136–145)
VANCOMYCIN SERPL-MCNC: 7 UG/ML
WBC # BLD AUTO: 6.6 K/UL (ref 4–11)

## 2024-08-20 PROCEDURE — 6370000000 HC RX 637 (ALT 250 FOR IP): Performed by: INTERNAL MEDICINE

## 2024-08-20 PROCEDURE — 97116 GAIT TRAINING THERAPY: CPT

## 2024-08-20 PROCEDURE — 93306 TTE W/DOPPLER COMPLETE: CPT

## 2024-08-20 PROCEDURE — 2580000003 HC RX 258: Performed by: INTERNAL MEDICINE

## 2024-08-20 PROCEDURE — 85025 COMPLETE CBC W/AUTO DIFF WBC: CPT

## 2024-08-20 PROCEDURE — 80202 ASSAY OF VANCOMYCIN: CPT

## 2024-08-20 PROCEDURE — 97535 SELF CARE MNGMENT TRAINING: CPT

## 2024-08-20 PROCEDURE — 99233 SBSQ HOSP IP/OBS HIGH 50: CPT | Performed by: INTERNAL MEDICINE

## 2024-08-20 PROCEDURE — 1200000000 HC SEMI PRIVATE

## 2024-08-20 PROCEDURE — 36415 COLL VENOUS BLD VENIPUNCTURE: CPT

## 2024-08-20 PROCEDURE — 6360000002 HC RX W HCPCS: Performed by: INTERNAL MEDICINE

## 2024-08-20 PROCEDURE — 80048 BASIC METABOLIC PNL TOTAL CA: CPT

## 2024-08-20 PROCEDURE — 97530 THERAPEUTIC ACTIVITIES: CPT

## 2024-08-20 PROCEDURE — 93306 TTE W/DOPPLER COMPLETE: CPT | Performed by: INTERNAL MEDICINE

## 2024-08-20 RX ADMIN — LISINOPRIL 20 MG: 20 TABLET ORAL at 09:14

## 2024-08-20 RX ADMIN — INSULIN LISPRO 2 UNITS: 100 INJECTION, SOLUTION INTRAVENOUS; SUBCUTANEOUS at 17:06

## 2024-08-20 RX ADMIN — SODIUM CHLORIDE, PRESERVATIVE FREE 10 ML: 5 INJECTION INTRAVENOUS at 21:12

## 2024-08-20 RX ADMIN — INSULIN LISPRO 2 UNITS: 100 INJECTION, SOLUTION INTRAVENOUS; SUBCUTANEOUS at 11:25

## 2024-08-20 RX ADMIN — PANTOPRAZOLE SODIUM 40 MG: 40 TABLET, DELAYED RELEASE ORAL at 05:51

## 2024-08-20 RX ADMIN — TAMSULOSIN HYDROCHLORIDE 0.4 MG: 0.4 CAPSULE ORAL at 21:11

## 2024-08-20 RX ADMIN — ATORVASTATIN CALCIUM 40 MG: 40 TABLET, FILM COATED ORAL at 21:11

## 2024-08-20 RX ADMIN — LEVOFLOXACIN 750 MG: 5 INJECTION, SOLUTION INTRAVENOUS at 13:58

## 2024-08-20 RX ADMIN — ASPIRIN 81 MG: 81 TABLET, COATED ORAL at 09:14

## 2024-08-20 RX ADMIN — ENOXAPARIN SODIUM 40 MG: 100 INJECTION SUBCUTANEOUS at 09:14

## 2024-08-20 ASSESSMENT — ENCOUNTER SYMPTOMS
RHINORRHEA: 0
BACK PAIN: 0
EYE REDNESS: 0
EYE DISCHARGE: 0
CONSTIPATION: 0
SORE THROAT: 0
NAUSEA: 0
SINUS PRESSURE: 0
ABDOMINAL PAIN: 0
SINUS PAIN: 0
DIARRHEA: 0
WHEEZING: 0
COUGH: 1
SHORTNESS OF BREATH: 0

## 2024-08-20 ASSESSMENT — PAIN SCALES - GENERAL: PAINLEVEL_OUTOF10: 0

## 2024-08-20 NOTE — PROGRESS NOTES
Pt resting awake in bed, assisted to set upo for breakfast. Remains disoriented to time and situation. PO meds taken easily with water. Able to follow commands t/o assessment. Denies pain at this time. Call light in easy reach.

## 2024-08-20 NOTE — PROGRESS NOTES
Clinical Pharmacy Note: Pharmacy to Dose Vancomycin    Vancomycin Day: 2  Indication: Sepsis  Current Dose: 1500 mg q24h  Dosing Method: Bayesian Modeling    Random: 7.0    Recent Labs     08/19/24  0504 08/20/24  0611   BUN 30* 22*       Recent Labs     08/19/24  0504 08/20/24  0611   CREATININE 1.3 1.0       Recent Labs     08/19/24  0504 08/20/24  0611   WBC 8.8 6.6         Intake/Output Summary (Last 24 hours) at 8/20/2024 1211  Last data filed at 8/20/2024 0430  Gross per 24 hour   Intake --   Output 1000 ml   Net -1000 ml         Ht Readings from Last 1 Encounters:   08/17/24 1.753 m (5' 9\")        Wt Readings from Last 1 Encounters:   08/17/24 89.6 kg (197 lb 8 oz)         Body mass index is 29.17 kg/m².    Estimated Creatinine Clearance: 74 mL/min (based on SCr of 1 mg/dL).      Assessment/Plan:  Vancomycin level is subtherapeutic.  Increase vancomycin regimen to 1000 mg every 12 hours.   Bayesian Modeling predicts an AUC of 495 mg/L*hr and trough of 14.8 mg/L.  Vancomycin levels will be ordered when clinically indicated  Changes in regimen will be determined based on culture results, renal function, and clinical response.  Pharmacy will continue to monitor and adjust regimen as necessary.    Thank you for the consult,    Magnus Santiago, PharmD  PGY-1 Pharmacy Resident   [facility]  [id number]

## 2024-08-20 NOTE — PROGRESS NOTES
Shift assessment, VSS, alert to self but intermittent confusion, acetaminophen given for elevated tem, purewick in place, denied for any pain, all evening medications given as per mar, denied for any other needs at this time, patient can appropriately call for any needs, will continue to  monitored.  Natasha Reed RN

## 2024-08-20 NOTE — PROGRESS NOTES
Physician Progress Note      PATIENT:               DERECK JUSTIN  CSN #:                  [id number]  :                       1950  ADMIT DATE:       2024 11:06 AM  DISCH DATE:  RESPONDING  PROVIDER #:        Marcell Hirsch MD          QUERY TEXT:    Pt admitted with fever.  Noted documentation of sepsis in ID consult on   24.  If possible, please document in progress notes and discharge   summary:    The medical record reflects the following:  Risk Factors: Age 72yo. Hx- CAD, Epilepsy, HTN, HLD, IFEANYI, DM2  Clinical Indicators: VS- 103.5, 110, 30, 143/65........WBC 8.6, Lactate 2.2,   Procal 0.57, Sed rate 65, .6....CT Chest-  possible atypical/viral   pneumonia....Per ID consult on 24- Assessment- Sepsis   Pneumonia   suspected.  Treatment: BC's, ID Consult, Resp panel, CT Chest, Legionella antigen, Strep   PNA antigen, HIV screen, MRSA DNA nasal probe, Levaquin IV, Vanco IV    Thank You,  Dorinda Hale RN BSN CDS CRCR  [email or web address]  Options provided:  -- Sepsis possibly due to pneumonia confirmed present on admission  -- Sepsis  ruled out  -- Defer to ID consultant documentation regarding Sepsis possibly due to   pneumonia**  -- Other - I will add my own diagnosis  -- Disagree - Not applicable / Not valid  -- Disagree - Clinically unable to determine / Unknown  -- Refer to Clinical Documentation Reviewer    PROVIDER RESPONSE TEXT:    The diagnosis of sepsis was ruled out.    Query created by: Dorinda Hale on 2024 6:24 AM      Electronically signed by:  Marcell Hirsch MD 2024 11:26 AM

## 2024-08-20 NOTE — CARE COORDINATION
Discharge Planning Note:    - PT/OT recommends SNF    - [facility] - unable to accept    Met with the patient. SNF options were reviewed again and the following referral was placed at the request of the patient:    - [facility] - ACCEPTED    - [facility] - STARTED on 08/20/2024    Will continue to follow.    JAMA TinocoN RN    [facility]  Phone: [phone]

## 2024-08-20 NOTE — PROGRESS NOTES
Tolerance: Good  Impairments Requiring Therapeutic Intervention: decreased functional mobility, decreased ADL status, decreased strength, decreased safety awareness, decreased cognition, decreased endurance, decreased balance, decreased IADL  Prognosis: good  Clinical Assessment: The patient is a 73 y.o. male who presents below their baseline level of function due to above deficits, associated with Frequent falls. Typically, pt is IND for mobility and ADL, is assisted for IADL by friend. Pt progressing with therapy this date, completing functional transfers Min A/CGA and functional mobility up to ~80ft. Pt donned LB brief/sneakers with CGA. Due to cognitive deficits,  pt is unsafe to return home alone, and may benefit from long term plan in terms of living situation as he has progressively gotten weaker and more confused living alone despite assistance of friend Sridhar. Continued OT indicated in order to promote return to PLOF    Safety Interventions: patient left in chair, chair alarm in place, call light within reach, gait belt, telesitter in use, and nurse notified    Plan  Frequency: 3-5 x/per week  Current Treatment Recommendations: strengthening, balance training, functional mobility training, transfer training, endurance training, patient/caregiver education, ADL/self-care training, IADL training, safety education, and equipment evaluation/education    Goals    Short Term Goals:  Time Frame: by discharge  Patient will complete upper body ADL at stand by assistance   Patient will complete lower body ADL at minimal assistance - goal met dressing 8/20  Patient will complete toileting at contact guard assistance   Patient will complete grooming at supervision   Patient will complete functional transfers at stand by assistance   Patient will complete functional mobility at stand by assistance     Above goals reviewed on 8/20/2024.  All goals are ongoing at this time unless indicated above.       Therapy Session  Time     Individual Group Co-treatment   Time In    1126   Time Out    1240   Minutes    74        Timed Code Treatment Minutes:  74 min  Total Treatment Minutes:  74 min       Electronically Signed By: LELA Shoemaker/RIMA [id number]

## 2024-08-20 NOTE — PROGRESS NOTES
[facility]    8/20/2024 11:44 AM        Name: Robinson Pascual .              Admitted: 8/17/2024  Primary Care Provider: Rashad Her MD (Tel: [phone])        Subjective:    Shortness of breath improving fevers are improved since starting Levaquin  Current Medications  levoFLOXacin (LEVAQUIN) 750 MG/150ML infusion 750 mg, Q24H  vancomycin (VANCOCIN) 1,500 mg in sodium chloride 0.9 % 250 mL IVPB (Woqx3Whn), Q24H  albuterol sulfate HFA (PROVENTIL;VENTOLIN;PROAIR) 108 (90 Base) MCG/ACT inhaler 2 puff, Q6H PRN  aluminum & magnesium hydroxide-simethicone (MAALOX) 200-200-20 MG/5ML suspension 5 mL, Q6H PRN  aspirin EC tablet 81 mg, Daily  atorvastatin (LIPITOR) tablet 40 mg, Nightly  lisinopril (PRINIVIL;ZESTRIL) tablet 20 mg, Daily  insulin lispro (HUMALOG,ADMELOG) injection vial 0-8 Units, TID WC  insulin lispro (HUMALOG,ADMELOG) injection vial 0-4 Units, Nightly  pantoprazole (PROTONIX) tablet 40 mg, QAM AC  tamsulosin (FLOMAX) capsule 0.4 mg, QPM  sodium chloride flush 0.9 % injection 5-40 mL, 2 times per day  sodium chloride flush 0.9 % injection 5-40 mL, PRN  0.9 % sodium chloride infusion, PRN  enoxaparin (LOVENOX) injection 40 mg, Daily  polyethylene glycol (GLYCOLAX) packet 17 g, Daily PRN  acetaminophen (TYLENOL) tablet 650 mg, Q6H PRN   Or  acetaminophen (TYLENOL) suppository 650 mg, Q6H PRN  nicotine (NICODERM CQ) 14 MG/24HR 1 patch, Daily  lactated ringers IV soln infusion, Continuous  dextrose bolus 10% 125 mL, PRN   Or  dextrose bolus 10% 250 mL, PRN  dextrose 10 % infusion, Continuous PRN        Objective:  BP (!) 144/79   Pulse 80   Temp 98 °F (36.7 °C) (Oral)   Resp 16   Ht 1.753 m (5' 9\")   Wt 89.6 kg (197 lb 8 oz)   SpO2 95%   BMI 29.17 kg/m²     Intake/Output Summary (Last 24 hours) at 8/20/2024 1144  Last data filed at 8/20/2024 0430  Gross per 24 hour   Intake --   Output 1000 ml   Net

## 2024-08-20 NOTE — PROGRESS NOTES
Infectious Diseases   Progress Note      Admission Date: 8/17/2024  Hospital Day: Hospital Day: 4   Attending: Marcell Hirsch MD  Date of service: 8/20/2024     Chief complaint/ Reason for consult:     Sepsis on admission with high-grade fever, heart rate greater than 90, respiratory rate greater than 20, elevated lactic acid level of 2.2  Generalized weakness and fall at home  Bilateral lower lobe atypical pneumonia  High-grade fever-seems to be responding to levofloxacin  Poorly controlled type 2 diabetes mellitus  Elevated procalcitonin of 0.57  Coronary artery disease    Microbiology:        I have reviewed allavailable micro lab data and cultures    Results       Procedure Component Value Units Date/Time    MRSA DNA Probe, Nasal [[id number]] Collected: 08/19/24 1440    Order Status: Completed Specimen: Nares Updated: 08/20/24 1326     MRSA SCREEN RT-PCR --     Negative  MRSA DNA not detected.  Normal Range: Not detected      Narrative:      ORDER#: [id number]                          ORDERED BY: BRANDY BAÑUELOS  SOURCE: Nares                              COLLECTED:  08/19/24 14:40  ANTIBIOTICS AT JOSIE.:                      RECEIVED :  08/19/24 20:34    Legionella antigen, urine [[id number]] Collected: 08/19/24 1300    Order Status: Completed Specimen: Urine, clean catch Updated: 08/20/24 0822     L. pneumophila Serogp 1 Ur Ag --     Presumptive Negative  No Legionella pneumophila serogroup 1 antigens detected.  A negative result does not exclude infection with  Legionella pneumophila serogroup 1 nor does it rule out  other microbial-caused respiratory infections or  disease caused by other serogroups of  Legionella pneumophila.  Normal Range: Presumptive Negative      Narrative:      ORDER#: [id number]                          ORDERED BY: BRANDY BAÑUELOS  SOURCE: Urine Clean Catch                  COLLECTED:  08/19/24 13:00  ANTIBIOTICS AT JOSIE.:                      RECEIVED :  08/19/24 20:34    Strep    Neurological:  Negative for dizziness, seizures and headaches.   Hematological:  Does not bruise/bleed easily.   Psychiatric/Behavioral:  Negative for agitation, hallucinations and suicidal ideas. The patient is not nervous/anxious.    All other systems reviewed and are negative.        Past Medical History: All past medical history reviewed today.    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    Past Surgical History:   Procedure Laterality Date    CORONARY ANGIOPLASTY WITH STENT PLACEMENT  10/2013    ELBOW BURSA SURGERY Left 7/24/2014    EXCISION OF OLECRANON BURSA, LEFT ELBOW    LUNG BIOPSY         Family History: All family history was reviewed today.        Problem Relation Age of Onset    High Blood Pressure Mother     Diabetes Father     Heart Disease Father     High Blood Pressure Father        Objective:       PHYSICAL EXAM:      Vitals:   Vitals:    08/20/24 0745 08/20/24 1108 08/20/24 1220 08/20/24 1221   BP: (!) 144/76 (!) 144/79 132/74 135/75   Pulse: 72 80     Resp: 16 16     Temp: 98.2 °F (36.8 °C) 98 °F (36.7 °C)     TempSrc: Oral Oral     SpO2: 95% 95%     Weight:       Height:           Physical Exam  Vitals and nursing note reviewed.   Constitutional:       Appearance: He is well-developed. He is not diaphoretic.      Comments: The patient was seen

## 2024-08-20 NOTE — PROGRESS NOTES
[facility] - Inpatient Rehabilitation Department   Phone: [phone]    Physical Therapy    [] Initial Evaluation            [x] Daily Treatment Note         [] Discharge Summary      Patient: Robinson Pascual   : 1950   MRN: [id number]   Date of Service:  2024  Admitting Diagnosis: Frequent falls  Current Admission Summary: Robinson Pascual is a 73 y.o. male who has been feeling fatigued for the last couple days feels like he had the flu has a slight cough that is nonproductive some chills and weakness and muscle aches. Denies any significant diarrhea nausea vomiting. Patient had multiple falls over the last couple weeks yesterday tripped and fell and was not able to get patient lives alone by himself friend had not heard from him thus called EMS and patient was found on the ground and brought to the ED.   Past Medical History:  has a past medical history of Anxiety, BPH (benign prostatic hyperplasia), CAD (coronary artery disease), Chronic back pain, DDD (degenerative disc disease), lumbosacral, Depression, Eosinophilic granuloma (HCC), Epilepsy (HCC), Hyperlipidemia, Hypertension, Irritable bowel syndrome, Myofascial pain syndrome, Obstructive sleep apnea (adult) (pediatric), and Type II or unspecified type diabetes mellitus without mention of complication, not stated as uncontrolled.  Past Surgical History:  has a past surgical history that includes Lung biopsy; Coronary angioplasty with stent (10/2013); and Elbow bursa surgery (Left, 2014).    Discharge Recommendations: Robinson Pascual scored a 17/24 on the AM-PAC short mobility form. Current research shows that an AM-PAC score of 17 or less is typically not associated with a discharge to the patient's home setting. Based on the patient's AM-PAC score and their current functional mobility deficits, it is recommended that the patient have 3-5 sessions per week of Physical Therapy at d/c to increase the patient's independence.  Please  requires extended time for processing commands and demonstrating understanding and compliance however slightly improved carryover noted throughout session. Pt would benefit from continued skilled PT services to address deficits and promote independence in order to safely return to PLOF.   Safety Interventions: patient left in chair, chair alarm in place, call light within reach, gait belt, patient at risk for falls, and nurse notified    Plan  Frequency: 3-5 x/per week  Current Treatment Recommendations: strengthening, ROM, balance training, functional mobility training, transfer training, gait training, stair training, endurance training, neuromuscular re-education, patient/caregiver education, home management training, pain management, home exercise program, safety education, and equipment evaluation/education    Goals  Patient Goals: Pt unable to state   Short Term Goals:  Time Frame: By discharge  Patient will complete bed mobility at stand by assistance   Patient will complete transfers at stand by assistance   Patient will ambulate 50 ft with use of rolling walker at stand by assistance  NO GOALS MET THIS DATE    Above goals reviewed on 8/20/2024.  All goals are ongoing at this time unless indicated above.      Therapy Session Time      Individual Group Co-treatment   Time In     1126   Time Out     1240   Minutes     74     Timed Code Treatment Minutes: 74   Total Treatment Minutes: 74       Electronically Signed By: Genaro Delong, SPT    PT providing direct supervision during session and assisting in making skilled judgements throughout session.  Sirena Willams PT, DPT [id number]

## 2024-08-21 VITALS
BODY MASS INDEX: 29.18 KG/M2 | RESPIRATION RATE: 16 BRPM | DIASTOLIC BLOOD PRESSURE: 82 MMHG | TEMPERATURE: 97.9 F | HEART RATE: 86 BPM | HEIGHT: 69 IN | SYSTOLIC BLOOD PRESSURE: 145 MMHG | OXYGEN SATURATION: 92 % | WEIGHT: 197 LBS

## 2024-08-21 PROBLEM — Z71.6 TOBACCO ABUSE COUNSELING: Status: ACTIVE | Noted: 2024-08-21

## 2024-08-21 LAB
ANION GAP SERPL CALCULATED.3IONS-SCNC: 10 MMOL/L (ref 3–16)
BASOPHILS # BLD: 0 K/UL (ref 0–0.2)
BASOPHILS NFR BLD: 0.4 %
BUN SERPL-MCNC: 16 MG/DL (ref 7–20)
CALCIUM SERPL-MCNC: 8.6 MG/DL (ref 8.3–10.6)
CHLORIDE SERPL-SCNC: 98 MMOL/L (ref 99–110)
CO2 SERPL-SCNC: 25 MMOL/L (ref 21–32)
CREAT SERPL-MCNC: 0.8 MG/DL (ref 0.8–1.3)
DEPRECATED RDW RBC AUTO: 14 % (ref 12.4–15.4)
EOSINOPHIL # BLD: 0.1 K/UL (ref 0–0.6)
EOSINOPHIL NFR BLD: 1.3 %
GFR SERPLBLD CREATININE-BSD FMLA CKD-EPI: >90 ML/MIN/{1.73_M2}
GLUCOSE BLD-MCNC: 174 MG/DL (ref 70–99)
GLUCOSE BLD-MCNC: 177 MG/DL (ref 70–99)
GLUCOSE BLD-MCNC: 249 MG/DL (ref 70–99)
GLUCOSE SERPL-MCNC: 189 MG/DL (ref 70–99)
HCT VFR BLD AUTO: 34.1 % (ref 40.5–52.5)
HGB BLD-MCNC: 11.7 G/DL (ref 13.5–17.5)
LYMPHOCYTES # BLD: 1.1 K/UL (ref 1–5.1)
LYMPHOCYTES NFR BLD: 18.9 %
MCH RBC QN AUTO: 29 PG (ref 26–34)
MCHC RBC AUTO-ENTMCNC: 34.3 G/DL (ref 31–36)
MCV RBC AUTO: 84.7 FL (ref 80–100)
MONOCYTES # BLD: 0.9 K/UL (ref 0–1.3)
MONOCYTES NFR BLD: 15 %
NEUTROPHILS # BLD: 3.9 K/UL (ref 1.7–7.7)
NEUTROPHILS NFR BLD: 64.4 %
PERFORMED ON: ABNORMAL
PLATELET # BLD AUTO: 187 K/UL (ref 135–450)
PMV BLD AUTO: 9.5 FL (ref 5–10.5)
POTASSIUM SERPL-SCNC: 4 MMOL/L (ref 3.5–5.1)
RBC # BLD AUTO: 4.03 M/UL (ref 4.2–5.9)
SODIUM SERPL-SCNC: 133 MMOL/L (ref 136–145)
WBC # BLD AUTO: 6.1 K/UL (ref 4–11)

## 2024-08-21 PROCEDURE — 99233 SBSQ HOSP IP/OBS HIGH 50: CPT | Performed by: INTERNAL MEDICINE

## 2024-08-21 PROCEDURE — 80048 BASIC METABOLIC PNL TOTAL CA: CPT

## 2024-08-21 PROCEDURE — 92523 SPEECH SOUND LANG COMPREHEN: CPT

## 2024-08-21 PROCEDURE — 92610 EVALUATE SWALLOWING FUNCTION: CPT

## 2024-08-21 PROCEDURE — 6360000002 HC RX W HCPCS: Performed by: INTERNAL MEDICINE

## 2024-08-21 PROCEDURE — 6370000000 HC RX 637 (ALT 250 FOR IP): Performed by: INTERNAL MEDICINE

## 2024-08-21 PROCEDURE — 85025 COMPLETE CBC W/AUTO DIFF WBC: CPT

## 2024-08-21 PROCEDURE — 2580000003 HC RX 258: Performed by: INTERNAL MEDICINE

## 2024-08-21 PROCEDURE — 36415 COLL VENOUS BLD VENIPUNCTURE: CPT

## 2024-08-21 PROCEDURE — 97535 SELF CARE MNGMENT TRAINING: CPT

## 2024-08-21 PROCEDURE — 97530 THERAPEUTIC ACTIVITIES: CPT

## 2024-08-21 PROCEDURE — 92526 ORAL FUNCTION THERAPY: CPT

## 2024-08-21 PROCEDURE — 93005 ELECTROCARDIOGRAM TRACING: CPT | Performed by: INTERNAL MEDICINE

## 2024-08-21 RX ORDER — LEVOFLOXACIN 750 MG/1
750 TABLET, FILM COATED ORAL DAILY
Qty: 7 TABLET | Refills: 0 | Status: SHIPPED | OUTPATIENT
Start: 2024-08-21 | End: 2024-08-28

## 2024-08-21 RX ORDER — LANOLIN ALCOHOL/MO/W.PET/CERES
500 CREAM (GRAM) TOPICAL DAILY
Status: DISCONTINUED | OUTPATIENT
Start: 2024-08-21 | End: 2024-08-21 | Stop reason: HOSPADM

## 2024-08-21 RX ADMIN — LEVOFLOXACIN 750 MG: 5 INJECTION, SOLUTION INTRAVENOUS at 12:33

## 2024-08-21 RX ADMIN — SODIUM CHLORIDE, PRESERVATIVE FREE 10 ML: 5 INJECTION INTRAVENOUS at 09:02

## 2024-08-21 RX ADMIN — INSULIN LISPRO 2 UNITS: 100 INJECTION, SOLUTION INTRAVENOUS; SUBCUTANEOUS at 12:29

## 2024-08-21 RX ADMIN — LISINOPRIL 20 MG: 20 TABLET ORAL at 08:58

## 2024-08-21 RX ADMIN — PANTOPRAZOLE SODIUM 40 MG: 40 TABLET, DELAYED RELEASE ORAL at 06:35

## 2024-08-21 RX ADMIN — ASPIRIN 81 MG: 81 TABLET, COATED ORAL at 08:58

## 2024-08-21 RX ADMIN — ACETAMINOPHEN 650 MG: 325 TABLET ORAL at 08:58

## 2024-08-21 RX ADMIN — ENOXAPARIN SODIUM 40 MG: 100 INJECTION SUBCUTANEOUS at 08:59

## 2024-08-21 RX ADMIN — CYANOCOBALAMIN TAB 1000 MCG 500 MCG: 1000 TAB at 11:35

## 2024-08-21 RX ADMIN — TAMSULOSIN HYDROCHLORIDE 0.4 MG: 0.4 CAPSULE ORAL at 17:03

## 2024-08-21 ASSESSMENT — ENCOUNTER SYMPTOMS
ABDOMINAL PAIN: 0
SORE THROAT: 0
SINUS PAIN: 0
BACK PAIN: 0
WHEEZING: 0
RHINORRHEA: 0
DIARRHEA: 0
EYE REDNESS: 0
NAUSEA: 0
COUGH: 0
SINUS PRESSURE: 0
SHORTNESS OF BREATH: 0
CONSTIPATION: 0
EYE DISCHARGE: 0

## 2024-08-21 ASSESSMENT — PAIN SCALES - GENERAL
PAINLEVEL_OUTOF10: 6
PAINLEVEL_OUTOF10: 4
PAINLEVEL_OUTOF10: 6
PAINLEVEL_OUTOF10: 0

## 2024-08-21 ASSESSMENT — PAIN DESCRIPTION - LOCATION
LOCATION: GROIN
LOCATION: GROIN

## 2024-08-21 ASSESSMENT — PAIN DESCRIPTION - ORIENTATION
ORIENTATION: RIGHT
ORIENTATION: RIGHT

## 2024-08-21 ASSESSMENT — PAIN DESCRIPTION - PAIN TYPE: TYPE: ACUTE PAIN

## 2024-08-21 ASSESSMENT — PAIN DESCRIPTION - DESCRIPTORS
DESCRIPTORS: ACHING
DESCRIPTORS: ACHING

## 2024-08-21 NOTE — DISCHARGE INSTR - COC
Continuity of Care Form    Patient Name: Robinson Pascual   :  1950  MRN:  [id number]    Admit date:  2024  Discharge date:  24    Code Status Order: Full Code   Advance Directives:   Advance Care Flowsheet Documentation             Admitting Physician:  Marcell Hirsch MD  PCP: Rashad Her MD    Discharging Nurse: AS  Discharging Hospital Unit/Room#: 4TN-4462/4462-01  Discharging Unit Phone Number: [phone]    Emergency Contact:   Extended Emergency Contact Information  Primary Emergency Contact: Shyanne Lopez  Home Phone: [phone]  Relation: Brother/Sister  Secondary Emergency Contact: Marcelle Mathew  Home Phone: [phone]  Relation: Niece/Nephew    Past Surgical History:  Past Surgical History:   Procedure Laterality Date    CORONARY ANGIOPLASTY WITH STENT PLACEMENT  10/2013    ELBOW BURSA SURGERY Left 2014    EXCISION OF OLECRANON BURSA, LEFT ELBOW    LUNG BIOPSY         Immunization History:   Immunization History   Administered Date(s) Administered    TDaP, ADACEL (age 10y-64y), BOOSTRIX (age 10y+), IM, 0.5mL 10/08/2018       Active Problems:  Patient Active Problem List   Diagnosis Code    Diabetes mellitus (HCC) E11.9    Essential hypertension I10    Anxiety F41.9    Irritable bowel syndrome K58.9    GERD (gastroesophageal reflux disease) K21.9    Eczema L30.9    DDD (degenerative disc disease), lumbosacral M51.37    DM (diabetes mellitus), secondary, with complications (HCC) E13.8    Neck pain M54.2    BPH (benign prostatic hyperplasia) N40.0    Acute MI, lateral wall, initial episode of care (HCC) I21.29    Coronary artery disease due to lipid rich plaque I25.10, I25.83    Myofascial pain syndrome M79.18    Olecranon bursitis M70.20    Hematoma T14.8XXA    Elbow pain M25.529    Status post insertion of drug-eluting stent into right coronary artery for coronary artery disease Z95.5    IFEANYI (obstructive sleep apnea) G47.33    Depression F32.A    Eosinophilic granuloma

## 2024-08-21 NOTE — PROGRESS NOTES
Shift assessment complete, VSS on 2L oxygen, pt A&Ox4 in bed, working with therapy. C/o some dizziness. C/o pain to Rt groin 6/10. Scheduled and prn med given per MAR. POC and education reviewed with the patient. Safety measures in place. All needs met at this time, call light in reach, will continue to monitor.

## 2024-08-21 NOTE — PROGRESS NOTES
further patient specific details.    If patient discharges prior to next session this note will serve as a discharge summary.  Please see below for the latest assessment towards goals.       DME Required For Discharge: DME to be determined at next level of care, DME to be determined pending patient progress    Precautions/Restrictions: high fall risk  Weight Bearing Restrictions: no restrictions    Pre-Admission Information   Lives With: alone    Type of Home: house  Home Layout: one level, laundry in basement, Friend \"Sridhar\" helps to manage laundry since washer/dryer broke  Home Access:  2 step to enter without rails   Bathroom Layout: tub/shower unit  Bathroom Equipment:  has shower chair but does not use  Toilet Height: standard height  Home Equipment: rolling walker  Transfer Assistance: Independent without use of device  Ambulation Assistance:Independent without use of device, Reports not using RW as it is too wide for his house, held onto furniture with ambulation  ADL Assistance: independent with all ADL's  IADL Assistance: requires assistance with laundry, requires assistance with shopping, requires assistance for medication management, reports he hasn't cleaned house in \"forever\"  Active :        [] Yes  [x] No - Sridhar transports pt, pt doesn't leave house often  Hand Dominance: [x] Left  [] Right  Current Employment: retired.  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 Pt 2L O2. O2 WNL   General: Pt agreeable to shower level ADL.  Pain:  and nurse notified    Plan  Frequency: 3-5 x/per week  Current Treatment Recommendations: strengthening, balance training, functional mobility training, transfer training, endurance training, patient/caregiver education, ADL/self-care training, IADL training, safety education, and equipment evaluation/education    Goals    Short Term Goals:  Time Frame: by discharge  Patient will complete upper body ADL at stand by assistance   Patient will complete lower body ADL at minimal assistance - goal met dressing 8/20 - UPDATE GOAL - Pt will complete lower body ADL Mod I  Patient will complete toileting at contact guard assistance   Patient will complete grooming at supervision   Patient will complete functional transfers at stand by assistance   Patient will complete functional mobility at stand by assistance     Above goals reviewed on 8/21/2024.  All goals are ongoing at this time unless indicated above.       Therapy Session Time     Individual Group Co-treatment   Time In [facility]     Time Out 1109     Minutes 53          Timed Code Treatment Minutes:  53 min  Total Treatment Minutes:  53 min       Electronically Signed By: LELA Shoemaker/RIMA [id number]

## 2024-08-21 NOTE — PROGRESS NOTES
Infectious Diseases   Progress Note      Admission Date: 8/17/2024  Hospital Day: Hospital Day: 5   Attending: No att. providers found  Date of service: 8/21/2024     Chief complaint/ Reason for consult:     Sepsis on admission with high-grade fever, heart rate greater than 90, respiratory rate greater than 20, elevated lactic acid level of 2.2  Generalized weakness and fall at home  Bilateral lower lobe atypical pneumonia  High-grade fever-seems to be responding to levofloxacin  Poorly controlled type 2 diabetes mellitus  Elevated procalcitonin of 0.57  Coronary artery disease    Microbiology:        I have reviewed allavailable micro lab data and cultures    Results       Procedure Component Value Units Date/Time    MRSA DNA Probe, Nasal [[id number]] Collected: 08/19/24 1440    Order Status: Completed Specimen: Nares Updated: 08/20/24 1326     MRSA SCREEN RT-PCR --     Negative  MRSA DNA not detected.  Normal Range: Not detected      Narrative:      ORDER#: [id number]                          ORDERED BY: BRANDY BAÑUELOS  SOURCE: Nares                              COLLECTED:  08/19/24 14:40  ANTIBIOTICS AT JOSIE.:                      RECEIVED :  08/19/24 20:34    Legionella antigen, urine [[id number]] Collected: 08/19/24 1300    Order Status: Completed Specimen: Urine, clean catch Updated: 08/20/24 0822     L. pneumophila Serogp 1 Ur Ag --     Presumptive Negative  No Legionella pneumophila serogroup 1 antigens detected.  A negative result does not exclude infection with  Legionella pneumophila serogroup 1 nor does it rule out  other microbial-caused respiratory infections or  disease caused by other serogroups of  Legionella pneumophila.  Normal Range: Presumptive Negative      Narrative:      ORDER#: [id number]                          ORDERED BY: BRANDY BAÑUELOS  SOURCE: Urine Clean Catch                  COLLECTED:  08/19/24 13:00  ANTIBIOTICS AT JOSIE.:                      RECEIVED :  08/19/24 20:34    Strep  Other portions of the CT abdomen and pelvis without contrast appear   unremarkable.   8.  No findings to suggest acute appendicitis; no ureter calculus or   hydronephrosis.         XR CHEST (2 VW)   Final Result   Diffuse bilateral opacity could reflect pulmonary edema or in the appropriate   clinical setting, atypical infection.         CTA HEAD NECK W CONTRAST   Final Result   Moderate stenosis in the A4 segment of the right VIVI.      50% stenosis in the cavernous/supraclinoid segments of the right internal   carotid artery.      No acute abnormality or flow-limiting stenosis of the major arteries of the   neck.      Ground-glass attenuation and septal thickening at the lung apices, may be   related to mild pulmonary vascular congestion versus infection/inflammation,   stable.      Left maxillary sinusitis.         CT HEAD WO CONTRAST   Final Result   No acute intracranial abnormality.         XR THORACIC SPINE (2 VIEWS)   Final Result   Multilevel degenerative disc disease      No acute thoracic spine abnormality         XR KNEE LEFT (3 VIEWS)   Final Result   1. No acute fracture or dislocation.   2. Mild medial and patellofemoral compartment degenerative changes.         XR KNEE RIGHT (3 VIEWS)   Final Result   1. No acute fracture or dislocation.   2. Mild medial and patellofemoral compartment degenerative changes.         XR CHEST PORTABLE   Final Result   Stable emphysema and chronic lung changes. No superimposed acute infiltrate.             Medications: All current and past medications were reviewed.                      Problem list:       Patient Active Problem List   Diagnosis Code    Diabetes mellitus (HCC) E11.9    Essential hypertension I10    Anxiety F41.9    Irritable bowel syndrome K58.9    GERD (gastroesophageal reflux disease) K21.9    Eczema L30.9    DDD (degenerative disc disease), lumbosacral M51.37    DM (diabetes mellitus), secondary, with complications (HCC) E13.8    Neck pain M54.2    BPH

## 2024-08-21 NOTE — PROGRESS NOTES
Data- discharge order received, pt.  disposition to [facility] #[phone], ILENE reviewed and signed by physician.    Action- AVS prepared, ILENE completed/ reported faxed by case management/. Discharge instruction summary: Diet- regular, Activity- up as tolerated, immunizations reviewed and updated, medications prescriptions to be filled at receiving facility except for the controlled prescriptions to be sent: Na, Transfer code status: Full Code, LDAs to remain with discharge: NA.   DME used: NA.     Response- Bedside RN to call report to receiving facility. Pt belongings gathered, peripheral IV and cardiac monitoring removed. Disposition to Discharged via ambulance to skilled nursing by EMS transportation, no complications reported.       1. WEIGHT: Admit Weight - Scale: 89.6 kg (197 lb 8 oz) (08/17/24 1111)        Today  Weight - Scale: 89.4 kg (197 lb) (08/20/24 1621)       2. O2 SAT.: SpO2: 92 % (08/21/24 1630)

## 2024-08-21 NOTE — PROGRESS NOTES
Speech Language Pathology  [facility] - Inpatient Rehabilitation Services  [phone]  SLP Clinical Swallow Evaluation and Speech Language Cognitive Assessment       Patient: Robinson Pascual   : 1950   MRN: [id number]      Evaluation Date: 2024      Admitting Dx: Hypomagnesemia [E83.42]  Hyponatremia [E87.1]  Generalized weakness [R53.1]  Elevated CK [R74.8]  Unable to ambulate [R26.2]  Frequent falls [R29.6]  Contusion of right knee, initial encounter [S80.01XA]  Contusion of left knee, initial encounter [S80.02XA]  Fall, initial encounter [W19.XXXA]  Acute hip pain, left [M25.552]  Thoracic myofascial strain, initial encounter [S29.019A]  Fever and chills [R50.9]  Pain: Denies                                  Recommendations      Recommended Diet and Intervention 2024:  Diet Solids Recommendation:  Regular texture diet  Liquid Consistency Recommendation:  Thin liquids  Recommended form of Meds: Meds whole with water or Meds in puree          Compensatory strategies: Upright as possible with all PO intake , Small bites/sips , Swallow 2 times per bite , Eat/feed slowly, Remain upright 30-45 min     Discharge Recommendations:  Recommend ongoing SLP for speech and dysphagia therapy upon discharge from hospital     History/Course of Treatment     H&P: Robinson Pascual is a 73 y.o. male who has been feeling fatigued for the last couple days feels like he had the flu has a slight cough that is nonproductive some chills and weakness and muscle aches.  Denies any significant diarrhea nausea vomiting.  Patient had multiple falls over the last couple weeks yesterday tripped and fell and was not able to get patient lives alone by himself friend had not heard from him thus called EMS and patient was found on the ground and brought to the ED.     Imaging:  Chest X-ray:   IMPRESSION:  Diffuse bilateral opacity could reflect pulmonary edema or in the appropriate  clinical setting, atypical infection.

## 2024-08-21 NOTE — PLAN OF CARE
Problem: Discharge Planning  Goal: Discharge to home or other facility with appropriate resources  8/21/2024 1238 by Jewell Castellanos RN  Outcome: Progressing  8/21/2024 0007 by Allegra Frank RN  Outcome: Progressing     Problem: Safety - Adult  Goal: Free from fall injury  8/21/2024 1238 by Jewell Castellanos RN  Outcome: Progressing  8/21/2024 0007 by Allegra Frank RN  Outcome: Progressing     Problem: Skin/Tissue Integrity  Goal: Absence of new skin breakdown  Description: 1.  Monitor for areas of redness and/or skin breakdown  2.  Assess vascular access sites hourly  3.  Every 4-6 hours minimum:  Change oxygen saturation probe site  4.  Every 4-6 hours:  If on nasal continuous positive airway pressure, respiratory therapy assess nares and determine need for appliance change or resting period.  8/21/2024 1238 by Jewell Castellanos RN  Outcome: Progressing  8/21/2024 0007 by Allegra Frank RN  Outcome: Progressing     Problem: Chronic Conditions and Co-morbidities  Goal: Patient's chronic conditions and co-morbidity symptoms are monitored and maintained or improved  8/21/2024 1238 by Jewell Castellanos RN  Outcome: Progressing  8/21/2024 0007 by Allegra Frank RN  Outcome: Progressing     Problem: Pain  Goal: Verbalizes/displays adequate comfort level or baseline comfort level  8/21/2024 1238 by Jewell Castellanos RN  Outcome: Progressing  8/21/2024 0007 by Allegra Frank RN  Outcome: Progressing

## 2024-08-21 NOTE — PROGRESS NOTES
Physician Progress Note      PATIENT:               DERECK JUSTIN  CSN #:                  [id number]  :                       1950  ADMIT DATE:       2024 11:06 AM  DISCH DATE:  RESPONDING  PROVIDER #:        Marcell Hirsch MD          QUERY TEXT:    Internal Medicine,    Pt admitted with frequent falls and was found to have pneumonia. The cause of   the falls is unclear. If possible, please document in progress notes and   discharge summary the cause of the falls.    Risk Factors: deconditing, PNA  Clinical Indicators:  frequent falls documented as principal admission dx,   deconditioning and pneumonia are documented however neither condition is   specified as the cause of the falls  Treatment: labs, imaging, PT/OT, IV Levaquin and VAnco, medical management and   supportive care    Thank you,  Tawanna Hernandez RN CDS  [phone]  Options provided:  -- Falls  due to  pneumonia  -- Falls  due to  debility/failure to thrive exacerbated by pneumonia  -- Falls  due to, please document cause  -- Other - I will add my own diagnosis  -- Disagree - Not applicable / Not valid  -- Disagree - Clinically unable to determine / Unknown  -- Refer to Clinical Documentation Reviewer    PROVIDER RESPONSE TEXT:    This patient has Falls due to debility/failure to thrive exacerbated by   pneumonia    Query created by: Tawanna Hernandez on 2024 11:14 AM      Electronically signed by:  Marcell Hirsch MD 2024 11:18 AM

## 2024-08-21 NOTE — PROGRESS NOTES
[facility] - Inpatient Rehabilitation Department   Phone: [phone]    Physical Therapy    [] Initial Evaluation            [x] Daily Treatment Note         [] Discharge Summary      Patient: Robinson Pascual   : 1950   MRN: [id number]   Date of Service:  2024  Admitting Diagnosis: Frequent falls  Current Admission Summary: Robinson Pascual is a 73 y.o. male who has been feeling fatigued for the last couple days feels like he had the flu has a slight cough that is nonproductive some chills and weakness and muscle aches. Denies any significant diarrhea nausea vomiting. Patient had multiple falls over the last couple weeks yesterday tripped and fell and was not able to get patient lives alone by himself friend had not heard from him thus called EMS and patient was found on the ground and brought to the ED.   Past Medical History:  has a past medical history of Anxiety, BPH (benign prostatic hyperplasia), CAD (coronary artery disease), Chronic back pain, DDD (degenerative disc disease), lumbosacral, Depression, Eosinophilic granuloma (HCC), Epilepsy (HCC), Hyperlipidemia, Hypertension, Irritable bowel syndrome, Myofascial pain syndrome, Obstructive sleep apnea (adult) (pediatric), and Type II or unspecified type diabetes mellitus without mention of complication, not stated as uncontrolled.  Past Surgical History:  has a past surgical history that includes Lung biopsy; Coronary angioplasty with stent (10/2013); and Elbow bursa surgery (Left, 2014).    Discharge Recommendations: Robinson Pascual scored a 17/24 on the AM-PAC short mobility form. Current research shows that an AM-PAC score of 17 or less is typically not associated with a discharge to the patient's home setting. Based on the patient's AM-PAC score and their current functional mobility deficits, it is recommended that the patient have 3-5 sessions per week of Physical Therapy at d/c to increase the patient's independence.  Please  deficits and facilitate a safe and independent return to PLOF.   Safety Interventions: patient left in chair, chair alarm in place, call light within reach, gait belt, patient at risk for falls, and nurse notified    Plan  Frequency: 3-5 x/per week  Current Treatment Recommendations: strengthening, ROM, balance training, functional mobility training, transfer training, gait training, stair training, endurance training, neuromuscular re-education, patient/caregiver education, home management training, pain management, home exercise program, safety education, and equipment evaluation/education    Goals  Patient Goals: Pt unable to state   Short Term Goals:  Time Frame: By discharge  Patient will complete bed mobility at stand by assistance   Patient will complete transfers at stand by assistance   Patient will ambulate 50 ft with use of rolling walker at stand by assistance  NO GOALS MET THIS DATE    Above goals reviewed on 8/21/2024.  All goals are ongoing at this time unless indicated above.      Therapy Session Time      Individual Group Co-treatment   Time In 0818       Time Out 0917       Minutes 59         Timed Code Treatment Minutes: 59   Total Treatment Minutes: 59       Electronically Signed By: Genaro Delong, SPT    PT providing direct supervision during session and assisting in making skilled judgements throughout session.  Sirena Willams PT, DPT [id number]

## 2024-08-21 NOTE — CARE COORDINATION
Case Management -  Discharge Note      Patient Name: Robinson Pascual                   YOB: 1950            Readmission Risk (Low < 19, Mod (19-27), High > 27): Readmission Risk Score: 12.5    Current PCP: Rashad Her MD        Date: 08/18/2024    PT AM-PAC: 17 /24  OT AM-PAC: 17 /24    Patient/patient representative has been educated on the benefits of SNF as well as the possible risks of declining recommended services. Patient/patient representative has acknowledged the information provided and decided on the following discharge plan. Patient/ patient representative has been provided freedom of choice regarding service provider, supported by basic dialogue that supports the patient's individualized plan of care/goals.    Patient noted to have a discharge order.  Pt has been medically cleared for transition to Skilled Nursing Rehab Facility    Patient discharged to:     [facility]  [place] [address]  Report: [phone]  Fax: [phone]         HENS Completed:  yes    Pre-cert required/obtained:  yes    Transportation scheduled for: 1830    Transportation provided by: [employer] [phone]    AVS faxed and agency notified:  yes    The following prescriptions sent with pt: N/A    Family Notified:  Patient will notify the family/friends      Nurse to call report to facility: [phone]    Financial    Payor: [employer] MEDICARE / Plan: AET[employer] MEDICARE ADVANTAGE HMO / Product Type: Medicare /     Pharmacy:  Potential assistance Purchasing Medications: No  Meds-to-Beds request: No      [facility]/pharmacy #[id number] - [place], [place] - [address] [address] [phone] - F [phone]  [address] [facility] [address]  Phone: [phone] Fax: [phone]    [facility] ([facility]) #[id number] - [place], [place] - [address] [phone] - F [phone]  [address] [address]  Phone: [phone] Fax:

## 2024-08-21 NOTE — PROGRESS NOTES
Physician Progress Note      PATIENT:               DERECK JUSTIN  CSN #:                  [id number]  :                       1950  ADMIT DATE:       2024 11:06 AM  DISCH DATE:  RESPONDING  PROVIDER #:        Marcell Hirsch MD          QUERY TEXT:    Internal Medicine,    Pt admitted with falls and PNA. If possible, please clarify in the progress   notes and discharge summary if you are evaluating and/or treating the   following pneumonia type:    Note: CAP and HCAP indicate where the pneumonia was acquired, not a specific   type.    The medical record reflects the following:  Risk Factors: nonspecific  Clinical Indicators: Infectious Disease documents IV levaquin for empiric gram   negative coverage  Treatment: labs, imaging, ID consult, IV Levaquin & Vanco, medical management    Thank you,  Tawanna Hernandez RN CDS  [phone]  Options provided:  -- Gram negative pneumonia  -- Other - I will add my own diagnosis  -- Disagree - Not applicable / Not valid  -- Disagree - Clinically unable to determine / Unknown  -- Refer to Clinical Documentation Reviewer    PROVIDER RESPONSE TEXT:    Atypical pna    Query created by: Tawanna Hernandez on 2024 11:26 AM      Electronically signed by:  Marcell Hirsch MD 2024 11:43 AM

## 2024-08-21 NOTE — CARE COORDINATION
08/21/24 1731   IMM Letter   IMM Letter given to Patient/Family/Significant other/Guardian/POA/by: Case Management - pt ok given less than 4 hours to d/c   IMM Letter date given: 08/21/24   IMM Letter time given: 1705     Electronically signed by MAXIME Morejon, LSW on 8/21/2024 at 5:31 PM

## 2024-08-21 NOTE — DISCHARGE SUMMARY
Hospital Medicine Discharge Summary    Patient: Robinson Pascual     Gender: male  : 1950   Age: 73 y.o.  MRN: [id number]    Admitting Physician: Marcell Hirsch MD  Discharge Physician: Marcell Hirsch MD     Code Status: Full Code     Admit Date: 2024   Discharge Date:   2024    Disposition: home  Time spent arranging discharge: 31  minutes    Discharge Diagnoses:    Active Hospital Problems    Diagnosis Date Noted    Encounter for medication counseling [Z71.89] 2024    Unable to ambulate [R26.2] 2024    Overweight (BMI 25.0-29.9) [E66.3] 2024    Smoker [F17.200] 2024    Elevated procalcitonin [R79.89] 2024    Low back pain [M54.50] 2024    Pneumonia of both lower lobes due to infectious organism [J18.9] 2024    Sepsis (HCC) [A41.9] 2024    Acute hip pain, left [M25.552] 2024    Contusion of left knee [S80.02XA] 2024    Contusion of right knee [S80.01XA] 2024    Elevated CK [R74.8] 2024    Fall [W19.XXXA] 2024    Generalized weakness [R53.1] 2024    Hypomagnesemia [E83.42] 2024    Hyponatremia [E87.1] 2024    Thoracic myofascial strain [S29.019A] 2024    Fever and chills [R50.9] 2024    Frequent falls [R29.6] 2024    Poorly controlled type 2 diabetes mellitus (HCC) [E11.65]            Condition at Discharge:  Stable    Hospital Course:   Patient was admitted to the hospital with frequent falls likely secondary to fever caused from atypical pneumonia treated IV Levaquin blood cultures remain negative fevers resolved patient was discharged on course of Levaquin and discharged to rehab for further therapy patient had a B12 deficiency started on oral B12 we will check B12 levels on next office visit    Discharge Exam:    /79   Pulse 90   Temp 97.8 °F (36.6 °C) (Oral)   Resp 18   Ht 1.753 m (5' 9\")   Wt 89.4 kg (197 lb)   SpO2 96%   BMI 29.09 kg/m²   General  visually estimated EF of 55 - 60%. EF by 2D Simpsons Biplane is 59%. Left ventricle size is normal. Mildly increased wall thickness. Findings consistent with mild concentric hypertrophy. Normal wall motion. Grade I diastolic dysfunction with normal LAP. Average E/e' ratio is 7.78.   Tricuspid Valve: Mild regurgitation. The estimated RVSP is 53 mmHg.   Image quality is adequate.     CT CHEST ABDOMEN PELVIS WO CONTRAST Additional Contrast? None    Result Date: 8/19/2024  EXAMINATION: CT OF THE CHEST, ABDOMEN, AND PELVIS WITHOUT CONTRAST 8/19/2024 6:48 pm TECHNIQUE: CT of the chest, abdomen and pelvis was performed without the administration of intravenous contrast. Multiplanar reformatted images are provided for review. Automated exposure control, iterative reconstruction, and/or weight based adjustment of the mA/kV was utilized to reduce the radiation dose to as low as reasonably achievable. COMPARISON: None HISTORY: Sepsis FINDINGS: Chest: Technical: Evaluation of the lungs degraded because of motion during imaging, blurring detail and resulting in misregistration artifact. Mediastinum: Main pulmonary artery dilatation measuring 3.5 cm. Mild-to-moderate severity calcific atherosclerosis coronary arteries. Cardiac structures and great vessels appear otherwise unremarkable. Ascending thoracic aorta 3.1 cm, descending thoracic aorta 2.6 cm.  No pericardial effusion.  Posterior mediastinal structures appear unremarkable. No mediastinal or hilar adenopathy. Lungs/pleura: Chronic appearing coarse interstitial densities predominantly peripherally mid and upper lungs.  Images are greatly blurred because of motion during imaging.  There are patchy interstitial densities predominating upper lungs with some scattered areas of ground-glass opacification.  No suspicious soft tissue pulmonary nodule.  No pleural effusion or pneumothorax.  No inspissated secretions or endobronchial lesion evident. Soft Tissues/Bones: No acute

## 2024-08-22 LAB
BACTERIA BLD CULT ORG #2: NORMAL
BACTERIA BLD CULT: NORMAL

## 2024-08-23 LAB
EKG ATRIAL RATE: 80 BPM
EKG DIAGNOSIS: NORMAL
EKG P AXIS: 60 DEGREES
EKG P-R INTERVAL: 132 MS
EKG Q-T INTERVAL: 394 MS
EKG QRS DURATION: 92 MS
EKG QTC CALCULATION (BAZETT): 454 MS
EKG R AXIS: 18 DEGREES
EKG T AXIS: 64 DEGREES
EKG VENTRICULAR RATE: 80 BPM

## 2024-08-23 PROCEDURE — 93010 ELECTROCARDIOGRAM REPORT: CPT | Performed by: INTERNAL MEDICINE

## 2024-09-06 NOTE — PROGRESS NOTES
Physician Progress Note      PATIENT:               DERECK JUSTIN  CSN #:                  [id number]  :                       1950  ADMIT DATE:       2024 11:06 AM  DISCH DATE:        2024 6:51 PM  RESPONDING  PROVIDER #:        Marcell Hirsch MD          QUERY TEXT:    Internal Medicine,    Pt admitted with falls and PNA. Previous query response documented atypical   PNA. After further study, please clarify in the progress notes and discharge   summary if you are evaluating and/or treating the following pneumonia type:    Note: CAP and HCAP indicate where the pneumonia was acquired, not a specific   type.    The medical record reflects the following:  Risk Factors: DM  Clinical Indicators: On ID  noted: Agree with starting IV levofloxacin 750   mg every 24 hours for empiric gram-negative coverage.  I will start empiric   intravenous vancomycin for empiric gram-positive coverage.  Treatment: labs, imaging, ID consult, IV Levaquin & Vanco, medical management    Thank you,  Tawanna Hernandez RN [facility]  [phone]  Options provided:  -- Gram negative pneumonia  -- Other - I will add my own diagnosis  -- Disagree - Not applicable / Not valid  -- Disagree - Clinically unable to determine / Unknown  -- Refer to Clinical Documentation Reviewer    PROVIDER RESPONSE TEXT:    Gram ppostive pna    Query created by: Tawanna Hernandez on 2024 11:05 AM      Electronically signed by:  Marcell Hirsch MD 2024 2:03 PM
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
## 2025-02-03 ENCOUNTER — OFFICE VISIT (OUTPATIENT)
Dept: PULMONOLOGY | Age: 75
End: 2025-02-03
Payer: MEDICARE

## 2025-02-03 VITALS
OXYGEN SATURATION: 95 % | BODY MASS INDEX: 30.39 KG/M2 | HEART RATE: 74 BPM | DIASTOLIC BLOOD PRESSURE: 72 MMHG | HEIGHT: 69 IN | SYSTOLIC BLOOD PRESSURE: 116 MMHG | WEIGHT: 205.2 LBS

## 2025-02-03 DIAGNOSIS — I25.10 CORONARY ARTERY DISEASE DUE TO LIPID RICH PLAQUE: ICD-10-CM

## 2025-02-03 DIAGNOSIS — I25.83 CORONARY ARTERY DISEASE DUE TO LIPID RICH PLAQUE: ICD-10-CM

## 2025-02-03 DIAGNOSIS — I10 ESSENTIAL HYPERTENSION: ICD-10-CM

## 2025-02-03 DIAGNOSIS — E11.40 TYPE 2 DIABETES MELLITUS WITH DIABETIC NEUROPATHY, UNSPECIFIED WHETHER LONG TERM INSULIN USE (HCC): ICD-10-CM

## 2025-02-03 DIAGNOSIS — G47.33 OSA (OBSTRUCTIVE SLEEP APNEA): Primary | ICD-10-CM

## 2025-02-03 PROCEDURE — 3078F DIAST BP <80 MM HG: CPT | Performed by: NURSE PRACTITIONER

## 2025-02-03 PROCEDURE — G2211 COMPLEX E/M VISIT ADD ON: HCPCS | Performed by: NURSE PRACTITIONER

## 2025-02-03 PROCEDURE — 1123F ACP DISCUSS/DSCN MKR DOCD: CPT | Performed by: NURSE PRACTITIONER

## 2025-02-03 PROCEDURE — 3074F SYST BP LT 130 MM HG: CPT | Performed by: NURSE PRACTITIONER

## 2025-02-03 PROCEDURE — 1159F MED LIST DOCD IN RCRD: CPT | Performed by: NURSE PRACTITIONER

## 2025-02-03 PROCEDURE — 1160F RVW MEDS BY RX/DR IN RCRD: CPT | Performed by: NURSE PRACTITIONER

## 2025-02-03 PROCEDURE — 99214 OFFICE O/P EST MOD 30 MIN: CPT | Performed by: NURSE PRACTITIONER

## 2025-02-03 RX ORDER — NEBIVOLOL 10 MG/1
10 TABLET ORAL DAILY
COMMUNITY
Start: 2024-12-19

## 2025-02-03 RX ORDER — METOPROLOL TARTRATE 25 MG/1
25 TABLET, FILM COATED ORAL 2 TIMES DAILY
COMMUNITY
Start: 2025-01-27

## 2025-02-03 ASSESSMENT — SLEEP AND FATIGUE QUESTIONNAIRES
HOW LIKELY ARE YOU TO NOD OFF OR FALL ASLEEP WHEN YOU ARE A PASSENGER IN A CAR FOR AN HOUR WITHOUT A BREAK: WOULD NEVER DOZE
HOW LIKELY ARE YOU TO NOD OFF OR FALL ASLEEP WHILE SITTING AND READING: SLIGHT CHANCE OF DOZING
ESS TOTAL SCORE: 4
HOW LIKELY ARE YOU TO NOD OFF OR FALL ASLEEP WHILE WATCHING TV: WOULD NEVER DOZE
HOW LIKELY ARE YOU TO NOD OFF OR FALL ASLEEP IN A CAR, WHILE STOPPED FOR A FEW MINUTES IN TRAFFIC: WOULD NEVER DOZE
HOW LIKELY ARE YOU TO NOD OFF OR FALL ASLEEP WHILE SITTING QUIETLY AFTER LUNCH WITHOUT ALCOHOL: SLIGHT CHANCE OF DOZING
HOW LIKELY ARE YOU TO NOD OFF OR FALL ASLEEP WHILE SITTING INACTIVE IN A PUBLIC PLACE: WOULD NEVER DOZE
HOW LIKELY ARE YOU TO NOD OFF OR FALL ASLEEP WHILE SITTING AND TALKING TO SOMEONE: WOULD NEVER DOZE
HOW LIKELY ARE YOU TO NOD OFF OR FALL ASLEEP WHILE LYING DOWN TO REST IN THE AFTERNOON WHEN CIRCUMSTANCES PERMIT: MODERATE CHANCE OF DOZING

## 2025-02-03 NOTE — ASSESSMENT & PLAN NOTE
Chronic - Stable: Reviewed and analyzed results of physiologic download from patient's machine and reviewed with patients. Supplies and parts as needed for the machine. These are medically necessary. Limit caffeine use after 3 PM. Based on the analyzed data, we will continue with current settings. Continues to do well with his machine. Will send an order for a new machine one more time. His machine is over 10 yrs old and it started to make some noise from the motor. The machine is under the manufacture recall and it cannot be repaired or replaced. He is compliant and getting good symptom control. Stable on the current settings and he is getting benefit from using the machine. The machine is medically necessary. He will need to return between 31 to 90 days once he is set up with the new machine so we can document progress and compliance for the new machine. Instructed him to contact the office if experiences new or worsening of symptoms. Encouraged him to continue to use his machine each night, all night.

## 2025-02-03 NOTE — ASSESSMENT & PLAN NOTE
Chronic- Stable.  Discussed the importance of treating sleep apnea as part of the management of this disorder.  Cont any meds per PCP and other physicians.        -- Message is from the [facility]--    Reason for Call: Patient is experiencing swelling in her left knee. Patient says it's achy and the pain may be in the joint. The aching started a couple weeks ago and the swelling has started about a week ago. Patient does not recall any type of injury or fall at all. Patient prefers call from PCP but a nurse is ok.    Caller Information       Type Contact Phone    11/05/2019 05:16 PM Phone (Incoming) Delilah Mcknight (Self) [phone] (M)          Alternative phone number: na    Turnaround time given to caller:   \"This message will be sent to [state Provider's name]. The clinical team will fulfill your request as soon as they review your message when the office opens tomorrow.\"

## 2025-02-03 NOTE — PROGRESS NOTES
[facility]  [address]  [address]  [place] [address]  F- [phone]     [facility] [place] [facility]  [address]  [address]  [facility] [address]  Dept: [phone]  Dept Fax: [phone]  Loc: [phone]       Assessment & Plan  IFEANYI (obstructive sleep apnea)  Chronic - Stable: Reviewed and analyzed results of physiologic download from patient's machine and reviewed with patients. Supplies and parts as needed for the machine. These are medically necessary. Limit caffeine use after 3 PM. Based on the analyzed data, we will continue with current settings. Continues to do well with his machine. Will send an order for a new machine one more time. His machine is over 10 yrs old and it started to make some noise from the motor. The machine is under the manufacture recall and it cannot be repaired or replaced. He is compliant and getting good symptom control. Stable on the current settings and he is getting benefit from using the machine. The machine is medically necessary. He will need to return between 31 to 90 days once he is set up with the new machine so we can document progress and compliance for the new machine. Instructed him to contact the office if experiences new or worsening of symptoms. Encouraged him to continue to use his machine each night, all night.        Coronary artery disease due to lipid rich plaque   Chronic- Stable.  Discussed the importance of treating sleep apnea as part of the management of this disorder.  Cont any meds per PCP and other physicians.       Essential hypertension   Chronic- Stable.  Discussed the importance of treating sleep apnea as part of the management of this disorder.  Cont any meds per PCP and other physicians.       Type 2 diabetes mellitus with diabetic neuropathy, unspecified whether long term insulin use (HCC)   Chronic- Stable.  Discussed the

## 2025-03-08 ENCOUNTER — HOSPITAL ENCOUNTER (EMERGENCY)
Age: 75
Discharge: HOME | End: 2025-03-08
Attending: EMERGENCY MEDICINE
Payer: MEDICARE

## 2025-03-08 ENCOUNTER — APPOINTMENT (OUTPATIENT)
Dept: CT IMAGING | Age: 75
End: 2025-03-08
Payer: MEDICARE

## 2025-03-08 ENCOUNTER — APPOINTMENT (OUTPATIENT)
Dept: GENERAL RADIOLOGY | Age: 75
End: 2025-03-08
Payer: MEDICARE

## 2025-03-08 VITALS
HEIGHT: 69 IN | HEART RATE: 58 BPM | TEMPERATURE: 97.8 F | OXYGEN SATURATION: 94 % | SYSTOLIC BLOOD PRESSURE: 148 MMHG | RESPIRATION RATE: 19 BRPM | DIASTOLIC BLOOD PRESSURE: 62 MMHG | WEIGHT: 205 LBS | BODY MASS INDEX: 30.36 KG/M2

## 2025-03-08 DIAGNOSIS — J06.9 ACUTE UPPER RESPIRATORY INFECTION: ICD-10-CM

## 2025-03-08 DIAGNOSIS — R42 EPISODIC LIGHTHEADEDNESS: Primary | ICD-10-CM

## 2025-03-08 LAB
ALBUMIN SERPL-MCNC: 4.5 G/DL (ref 3.4–5)
ALBUMIN/GLOB SERPL: 1.5 {RATIO} (ref 1.1–2.2)
ALP SERPL-CCNC: 90 U/L (ref 40–129)
ALT SERPL-CCNC: 11 U/L (ref 10–40)
ANION GAP SERPL CALCULATED.3IONS-SCNC: 13 MMOL/L (ref 3–16)
AST SERPL-CCNC: 16 U/L (ref 15–37)
BASOPHILS # BLD: 0 K/UL (ref 0–0.2)
BASOPHILS NFR BLD: 0.7 %
BILIRUB SERPL-MCNC: 0.4 MG/DL (ref 0–1)
BILIRUB UR QL STRIP.AUTO: NEGATIVE
BUN SERPL-MCNC: 20 MG/DL (ref 7–20)
CALCIUM SERPL-MCNC: 10.7 MG/DL (ref 8.3–10.6)
CHLORIDE SERPL-SCNC: 103 MMOL/L (ref 99–110)
CLARITY UR: CLEAR
CO2 SERPL-SCNC: 28 MMOL/L (ref 21–32)
COLOR UR: YELLOW
CREAT SERPL-MCNC: 1.1 MG/DL (ref 0.8–1.3)
DEPRECATED RDW RBC AUTO: 14.9 % (ref 12.4–15.4)
EOSINOPHIL # BLD: 0.1 K/UL (ref 0–0.6)
EOSINOPHIL NFR BLD: 1.6 %
GFR SERPLBLD CREATININE-BSD FMLA CKD-EPI: 71 ML/MIN/{1.73_M2}
GLUCOSE BLD-MCNC: 127 MG/DL (ref 70–99)
GLUCOSE SERPL-MCNC: 148 MG/DL (ref 70–99)
GLUCOSE UR STRIP.AUTO-MCNC: >=1000 MG/DL
HCT VFR BLD AUTO: 45.4 % (ref 40.5–52.5)
HGB BLD-MCNC: 14.8 G/DL (ref 13.5–17.5)
HGB UR QL STRIP.AUTO: NEGATIVE
KETONES UR STRIP.AUTO-MCNC: ABNORMAL MG/DL
LEUKOCYTE ESTERASE UR QL STRIP.AUTO: NEGATIVE
LYMPHOCYTES # BLD: 2.1 K/UL (ref 1–5.1)
LYMPHOCYTES NFR BLD: 29.2 %
MCH RBC QN AUTO: 27.7 PG (ref 26–34)
MCHC RBC AUTO-ENTMCNC: 32.6 G/DL (ref 31–36)
MCV RBC AUTO: 84.9 FL (ref 80–100)
MONOCYTES # BLD: 0.5 K/UL (ref 0–1.3)
MONOCYTES NFR BLD: 7.6 %
NEUTROPHILS # BLD: 4.3 K/UL (ref 1.7–7.7)
NEUTROPHILS NFR BLD: 60.9 %
NITRITE UR QL STRIP.AUTO: NEGATIVE
NT-PROBNP SERPL-MCNC: 315 PG/ML (ref 0–124)
PERFORMED ON: ABNORMAL
PH UR STRIP.AUTO: 5.5 [PH] (ref 5–8)
PLATELET # BLD AUTO: 167 K/UL (ref 135–450)
PMV BLD AUTO: 9.3 FL (ref 5–10.5)
POTASSIUM SERPL-SCNC: 5 MMOL/L (ref 3.5–5.1)
PROT SERPL-MCNC: 7.5 G/DL (ref 6.4–8.2)
PROT UR STRIP.AUTO-MCNC: NEGATIVE MG/DL
RBC # BLD AUTO: 5.35 M/UL (ref 4.2–5.9)
SODIUM SERPL-SCNC: 144 MMOL/L (ref 136–145)
SP GR UR STRIP.AUTO: 1.02 (ref 1–1.03)
TROPONIN, HIGH SENSITIVITY: 14 NG/L (ref 0–22)
TROPONIN, HIGH SENSITIVITY: 16 NG/L (ref 0–22)
UA COMPLETE W REFLEX CULTURE PNL UR: ABNORMAL
UA DIPSTICK W REFLEX MICRO PNL UR: ABNORMAL
URN SPEC COLLECT METH UR: ABNORMAL
UROBILINOGEN UR STRIP-ACNC: 0.2 E.U./DL
WBC # BLD AUTO: 7.1 K/UL (ref 4–11)

## 2025-03-08 PROCEDURE — 93005 ELECTROCARDIOGRAM TRACING: CPT | Performed by: EMERGENCY MEDICINE

## 2025-03-08 PROCEDURE — 99285 EMERGENCY DEPT VISIT HI MDM: CPT

## 2025-03-08 PROCEDURE — 80053 COMPREHEN METABOLIC PANEL: CPT

## 2025-03-08 PROCEDURE — 70450 CT HEAD/BRAIN W/O DYE: CPT

## 2025-03-08 PROCEDURE — 81003 URINALYSIS AUTO W/O SCOPE: CPT

## 2025-03-08 PROCEDURE — 71045 X-RAY EXAM CHEST 1 VIEW: CPT

## 2025-03-08 PROCEDURE — 84484 ASSAY OF TROPONIN QUANT: CPT

## 2025-03-08 PROCEDURE — 83880 ASSAY OF NATRIURETIC PEPTIDE: CPT

## 2025-03-08 PROCEDURE — 85025 COMPLETE CBC W/AUTO DIFF WBC: CPT

## 2025-03-08 RX ORDER — DEXTROMETHORPHAN POLISTIREX 30 MG/5ML
60 SUSPENSION ORAL 2 TIMES DAILY PRN
Qty: 148 ML | Refills: 0 | Status: SHIPPED | OUTPATIENT
Start: 2025-03-08 | End: 2025-03-18

## 2025-03-08 ASSESSMENT — ENCOUNTER SYMPTOMS
CHEST TIGHTNESS: 0
DIARRHEA: 0
ABDOMINAL PAIN: 0
COUGH: 0
SHORTNESS OF BREATH: 0
VOMITING: 0
NAUSEA: 0

## 2025-03-08 ASSESSMENT — LIFESTYLE VARIABLES
HOW OFTEN DO YOU HAVE A DRINK CONTAINING ALCOHOL: MONTHLY OR LESS
HOW MANY STANDARD DRINKS CONTAINING ALCOHOL DO YOU HAVE ON A TYPICAL DAY: 1 OR 2

## 2025-03-08 NOTE — ED PROVIDER NOTES
I have personally performed a face to face diagnostic evaluation on this patient. I have fully participated in the care of this patient I personally saw the patient and performed a substantive portion of the visit including all aspects of the medical decision making.  I have reviewed and agree with all pertinent clinical information including history, physical exam, diagnostic tests, and the plan.      HPI: Robinson Pascual presented with multiple complaints.  Felt intermittently lightheaded, nauseous started few hours ago was fluctuating in intensity not present at rest.  Currently improved.  Began just feels sleepy.  Went to lay down in his bed and called 911.  Upon arrival he is fully alert and oriented GCS is 15 NIH stroke scale is 0.  Currently denies headache dizziness vision changes numbness weakness tingling.  No chest pain or shortness of breath no abdominal pain just intermittently felt lightheaded.  Has had some loose stool recently.  No urinary complaints  Chief Complaint   Patient presents with    Lightheadedness     Pt in from home lightheaded, nausea, started a couple hours ago      Review of Systems: See SAMMY note  Vital Signs: BP (!) 160/66   Pulse 62   Temp 97.8 °F (36.6 °C) (Oral)   Resp 22   Ht 1.753 m (5' 9\")   Wt 93 kg (205 lb)   SpO2 96%   BMI 30.27 kg/m²     Alert 74 y.o. male who does not appear toxic or acutely ill  HENT: Atraumatic, oral mucosa moist  Neck: Grossly normal ROM  Chest/Lungs: respiratory effort normal   Abdomen: Soft nontender  Musculoskeletal: Grossly normal ROM  Skin: No palor or diaphoresis  Neuro: Normal strength sensation bilateral upper and lower extremity    Medical Decision Making and Plan:  Pertinent Labs & Imaging studies reviewed. (See SAMMY chart for details)  I agree with SAMMY assessment and plan.  74-year-old male presents for episodic lightheadedness not currently present.  CT head is unremarkable chest x-ray shows prominent interstitial markings.  Considered

## 2025-03-08 NOTE — ED PROVIDER NOTES
[facility] EMERGENCY DEPARTMENT  EMERGENCY DEPARTMENT ENCOUNTER        Pt Name: Robinson Pascual  MRN: [id number]  Birthdate 1950  Date of evaluation: 3/8/2025  Provider: Jerzy Andersen PA-C  PCP: Rashad Her MD  Note Started: 3:36 PM EST 3/8/25       I have seen and evaluated this patient with my supervising physician Stanton Cardenas MD.      CHIEF COMPLAINT       Chief Complaint   Patient presents with    Lightheadedness     Pt in from home lightheaded, nausea, started a couple hours ago       HISTORY OF PRESENT ILLNESS: 1 or more Elements     History From: Patient     Limitations to history : None    Social Determinants Significantly Affecting Health : None    Chief Complaint: Lightheaded     Robinson Pascual is a 74 y.o. male with a history of hypertension, hyperlipidemia, CAD, MI, tobacco abuse, diabetes, CVA and epilepsy who presents to the emergency department today via ambulance from home stating just prior to arrival he was sitting at his computer when he began feeling sleepy and lightheaded. He went and lay down in his bed and then called 911.  He is alert and oriented x 3 with GCS 15 on arrival.  Paramedics report NIH of 0 and NIH is 0 on arrival to the emergency department.  Patient denies headache, dizziness, vision changes, numbness, tingling or weakness in his extremities.  He denies chest pain or shortness of breath.  He denies abdominal pain, nausea or vomiting.  He states his bowel movements have been somewhat loose for the last several days but not watery.  He has no urinary complaints.        Nursing Notes were all reviewed and agreed with or any disagreements were addressed in the HPI.    REVIEW OF SYSTEMS :      Review of Systems   Constitutional:  Negative for chills and fever.   Respiratory:  Negative for cough, chest tightness and shortness of breath.    Cardiovascular:  Negative for chest pain, palpitations and leg swelling.   Gastrointestinal:  Negative for abdominal    CURB 65:      PHYSICAL EXAM  1 or more Elements     ED Triage Vitals [03/08/25 1527]   BP Systolic BP Percentile Diastolic BP Percentile Temp Temp Source Pulse Respirations SpO2   (!) 160/66 -- -- 97.8 °F (36.6 °C) Oral 62 22 96 %      Height Weight - Scale         1.753 m (5' 9\") 93 kg (205 lb)             Physical Exam  Vitals and nursing note reviewed.   Constitutional:       Appearance: He is well-developed. He is not diaphoretic.   HENT:      Head: Normocephalic and atraumatic.      Mouth/Throat:      Mouth: Mucous membranes are moist.   Eyes:      General:         Right eye: No discharge.         Left eye: No discharge.      Extraocular Movements: Extraocular movements intact.      Conjunctiva/sclera: Conjunctivae normal.      Pupils: Pupils are equal, round, and reactive to light.   Cardiovascular:      Rate and Rhythm: Normal rate and regular rhythm.      Pulses: Normal pulses.      Heart sounds: Normal heart sounds.   Pulmonary:      Effort: Pulmonary effort is normal. No respiratory distress.      Breath sounds: Normal breath sounds.   Abdominal:      General: Abdomen is flat. Bowel sounds are normal. There is no distension.      Palpations: Abdomen is soft.      Tenderness: There is no abdominal tenderness. There is no guarding.   Musculoskeletal:         General: Normal range of motion.      Cervical back: Normal range of motion and neck supple.   Skin:     General: Skin is warm and dry.      Capillary Refill: Capillary refill takes less than 2 seconds.      Coloration: Skin is not pale.   Neurological:      Mental Status: He is alert and oriented to person, place, and time.      GCS: GCS eye subscore is 4. GCS verbal subscore is 5. GCS motor subscore is 6.      Cranial Nerves: Cranial nerves 2-12 are intact.      Sensory: Sensation is intact.      Motor: Motor function is intact.      Coordination: Coordination is intact.      Gait: Gait is intact.   Psychiatric:         Behavior: Behavior normal.

## 2025-03-09 LAB
EKG ATRIAL RATE: 63 BPM
EKG DIAGNOSIS: NORMAL
EKG P AXIS: 61 DEGREES
EKG P-R INTERVAL: 152 MS
EKG Q-T INTERVAL: 420 MS
EKG QRS DURATION: 100 MS
EKG QTC CALCULATION (BAZETT): 429 MS
EKG R AXIS: 206 DEGREES
EKG T AXIS: 107 DEGREES
EKG VENTRICULAR RATE: 63 BPM

## 2025-03-10 PROCEDURE — 93010 ELECTROCARDIOGRAM REPORT: CPT | Performed by: INTERNAL MEDICINE

## 2025-04-15 ENCOUNTER — OFFICE VISIT (OUTPATIENT)
Dept: NEUROLOGY | Age: 75
End: 2025-04-15
Payer: MEDICARE

## 2025-04-15 VITALS
SYSTOLIC BLOOD PRESSURE: 128 MMHG | HEART RATE: 85 BPM | DIASTOLIC BLOOD PRESSURE: 89 MMHG | HEIGHT: 69 IN | BODY MASS INDEX: 30.36 KG/M2 | WEIGHT: 205 LBS

## 2025-04-15 DIAGNOSIS — E53.8 VITAMIN B12 DEFICIENCY DISEASE: ICD-10-CM

## 2025-04-15 DIAGNOSIS — R25.1 TREMOR: ICD-10-CM

## 2025-04-15 DIAGNOSIS — G40.109 PARTIAL SYMPTOMATIC EPILEPSY WITH SIMPLE PARTIAL SEIZURES, NOT INTRACTABLE, WITHOUT STATUS EPILEPTICUS: Primary | ICD-10-CM

## 2025-04-15 PROCEDURE — 1160F RVW MEDS BY RX/DR IN RCRD: CPT | Performed by: PSYCHIATRY & NEUROLOGY

## 2025-04-15 PROCEDURE — 3079F DIAST BP 80-89 MM HG: CPT | Performed by: PSYCHIATRY & NEUROLOGY

## 2025-04-15 PROCEDURE — 3074F SYST BP LT 130 MM HG: CPT | Performed by: PSYCHIATRY & NEUROLOGY

## 2025-04-15 PROCEDURE — 1159F MED LIST DOCD IN RCRD: CPT | Performed by: PSYCHIATRY & NEUROLOGY

## 2025-04-15 PROCEDURE — 99213 OFFICE O/P EST LOW 20 MIN: CPT | Performed by: PSYCHIATRY & NEUROLOGY

## 2025-04-15 PROCEDURE — 1123F ACP DISCUSS/DSCN MKR DOCD: CPT | Performed by: PSYCHIATRY & NEUROLOGY

## 2025-04-15 RX ORDER — DAPAGLIFLOZIN 10 MG/1
10 TABLET, FILM COATED ORAL EVERY MORNING
COMMUNITY
Start: 2025-01-25

## 2025-04-15 RX ORDER — DIPHENOXYLATE HYDROCHLORIDE AND ATROPINE SULFATE 2.5; .025 MG/1; MG/1
1 TABLET ORAL 3 TIMES DAILY PRN
COMMUNITY
Start: 2025-01-14

## 2025-04-15 RX ORDER — BLOOD SUGAR DIAGNOSTIC
STRIP MISCELLANEOUS
COMMUNITY
Start: 2025-02-25

## 2025-04-15 RX ORDER — LOTILANER OPHTHALMIC SOLUTION 2.5 MG/ML
SOLUTION/ DROPS OPHTHALMIC
COMMUNITY
Start: 2025-04-09

## 2025-04-15 RX ORDER — HYDROCHLOROTHIAZIDE 25 MG/1
25 TABLET ORAL DAILY
COMMUNITY
Start: 2025-02-18

## 2025-04-15 NOTE — PROGRESS NOTES
The patient came today for follow up regarding: History of seizure and recent fall.          The patient came today by himself.  He is not a good historian.  Last seen in January of last year.  He came today with vague complaint of abdominal neuropathic pain.  Symptoms started August of last year.  Fell but sustained no head injury or loss of consciousness.  Since that time has been having some \"bruised feeling on my left lower abdomen.  No deep abdominal pain.  No paresthesia.  No other symptoms in the hands and feet.  Not consistent with B12.  No recent seizure or bladder incontinence or tongue biting.  No daily headache, weakness or numbness in the arms or legs.  Recent blood test from last month reviewed.  Other review of system was unremarkable    Exam:   Constitutional:   Vitals:    04/15/25 1118   BP: 128/89   Pulse: 85   Weight: 93 kg (205 lb)   Height: 1.753 m (5' 9.02\")       General appearance:  Normal development and appear in no acute distress.   Mental Status:   Oriented to person, place, problem, and time.    Memory: Good immediate recall.  Intact remote memory  Normal attention span and concentration.  Language: intact naming, repeating and fluency   poor fund of Knowledge. Aware of current events and vocabulary   Cranial Nerves:   II: Pupils: equal, round, reactive to light  III,IV,VI: Extra Ocular Movements are intact. No nystagmus  V: Facial sensation is intact   VII: Facial strength and movements: intact and symmetric  XII: Tongue movements are normal  Musculoskeletal: 5/5 in all 4 extremities.   Tone: Normal tone.   Coordination: no tremors or drift  DTR: symmetric 2 in UL and LL  Sensation: normal to all modalities in both arms and legs.  No skin rash  Gait/Posture: steady gait     ROS : A 10-14 system review of constitutional, cardiovascular, respiratory, GI, eyes, , ENT, musculoskeletal, endocrine, hematological, skin, SHEENT, genitourinary, psychiatric and neurologic systems was obtained

## 2025-04-15 NOTE — PATIENT INSTRUCTIONS
YOU MUST CONFIRM YOUR APPOINTMENT 1 DAY PRIOR OR IT WILL BE CANCELLED!!   Our office will call you 3 times the day prior to your appointment in an attempt to confirm.  Please return our call ASAP or confirm your appt through [facility] no later than 3 pm the day before your appointment.  If we do not hear back from you by 3 pm to confirm, your appointment will be cancelled & someone will be added into that slot from our wait list.

## 2025-04-16 ENCOUNTER — RESULTS FOLLOW-UP (OUTPATIENT)
Dept: NEUROLOGY | Age: 75
End: 2025-04-16

## 2025-04-16 LAB — VIT B12 SERPL-MCNC: 203 PG/ML (ref 211–911)

## 2025-04-18 NOTE — RESULT ENCOUNTER NOTE
Reached VM. LMOR advising low B12 and recommendation for monthly B12 injections. Advised pt to c/b to discuss

## 2025-04-21 NOTE — TELEPHONE ENCOUNTER
Spoke to pt. Pt states he has started taking OTC B12 I,000iu daily. He prefers to take OTC for the next 6 weeks, lab redraw and consideration for injections to follow.